# Patient Record
Sex: FEMALE
[De-identification: names, ages, dates, MRNs, and addresses within clinical notes are randomized per-mention and may not be internally consistent; named-entity substitution may affect disease eponyms.]

---

## 2017-05-10 ENCOUNTER — HOSPITAL ENCOUNTER (OUTPATIENT)
Dept: HOSPITAL 14 - H.OPSURG | Age: 57
Discharge: HOME | End: 2017-05-10
Attending: PODIATRIST
Payer: MEDICARE

## 2017-05-10 VITALS
RESPIRATION RATE: 18 BRPM | TEMPERATURE: 98.4 F | DIASTOLIC BLOOD PRESSURE: 83 MMHG | OXYGEN SATURATION: 95 % | HEART RATE: 88 BPM | SYSTOLIC BLOOD PRESSURE: 164 MMHG

## 2017-05-10 VITALS — BODY MASS INDEX: 35.9 KG/M2

## 2017-05-10 DIAGNOSIS — M79.672: ICD-10-CM

## 2017-05-10 DIAGNOSIS — M79.2: Primary | ICD-10-CM

## 2017-05-10 DIAGNOSIS — R60.0: ICD-10-CM

## 2017-05-10 DIAGNOSIS — I10: ICD-10-CM

## 2017-05-10 DIAGNOSIS — J44.9: ICD-10-CM

## 2017-05-10 DIAGNOSIS — M77.52: ICD-10-CM

## 2017-05-10 PROCEDURE — 82948 REAGENT STRIP/BLOOD GLUCOSE: CPT

## 2017-05-10 PROCEDURE — 64640 INJECTION TREATMENT OF NERVE: CPT

## 2017-05-10 RX ADMIN — BUPIVACAINE HYDROCHLORIDE ONE ML: 5 INJECTION, SOLUTION EPIDURAL; INTRACAUDAL; PERINEURAL at 08:30

## 2017-05-10 RX ADMIN — BUPIVACAINE HYDROCHLORIDE ONE ML: 5 INJECTION, SOLUTION EPIDURAL; INTRACAUDAL; PERINEURAL at 08:32

## 2017-05-10 RX ADMIN — LIDOCAINE HYDROCHLORIDE ONE ML: 10 INJECTION, SOLUTION EPIDURAL; INFILTRATION; INTRACAUDAL; PERINEURAL at 08:30

## 2017-05-10 RX ADMIN — LIDOCAINE HYDROCHLORIDE ONE ML: 10 INJECTION, SOLUTION EPIDURAL; INFILTRATION; INTRACAUDAL; PERINEURAL at 08:32

## 2017-05-10 NOTE — CP.SDSHP
Same Day Surgery [facility]





- History


Proposed Procedure: Left foot- platlet rich plasma and amniotic stm cell 

injection


Pre-Op Diagnosis: Left foot metatarsalgia and lateral ankle ligamentous low 

grade injury





- Previous Medical/Surgical History


Cardiac: Hypertension


Pulmonary: Emphysema/COPD


Pain: 5.


Previous Surgical History: Left peroneal primary repair Right buninectomy, 

Laparoscopic procedure of abdomen, Tubual Ligation





- Allergies


Allergies: 


Allergies





No Known Allergies Allergy (Verified 06/27/15 16:59)


 











- Physical Exam


Vital Signs: 


 Vital Signs











  05/10/17 05/10/17





  06:31 06:36


 


Temperature 97.9 F 


 


Pulse Rate 70 70


 


Respiratory 20 





Rate  


 


Blood Pressure 142/82 


 


O2 Sat by Pulse 97 





Oximetry  











Mental Status: Alert & Oriented x3


Neuro: WNL


Heart: WNL


Lungs: Other (Decreased breath sounds, no wheezing, or ronchi)





- {Optional Preform as Required}


Integument: WNL


Ortho: Other (Left foot tenderness of 3rd digit to palpation and ROM. Laxity 

nof 3rd digit noted at level of MTPJ. Lateral ankle pain in inversion and 

stress end point dorsiflexion and plantarflexion)





- Impression


Impression: Pt was seen and examined in SDS.  Pt NPO status was confirmed.  All 

Pre-op testing and clearance was in the chart.  Pt has exhausted all 

conservative treatment at this time and is opting for surgical intervention.  

Pt was explained procedure and post-operative course.  All pt's questions were 

answered to satisfaction.  No guarantees were made.  Pt understands all risks, 

benefits and complications of procedure.  Pt will follow-up with Dr. Jon.


Pt. Evaluated Today:Candidate for Anesthesia & Procedure: Yes





- Date & Time


Date: 05/10/17


Time: 07:00





Short Stay Discharge





- Short Stay Discharge


Admitting Diagnosis/Reason for Visit: M79.2 R60.0 M77.52 M79.672


Disposition: HOME/ ROUTINE


Referrals: 


Tian Simmons MD [Primary Care Provider] - 


Silver Jon DPM [Staff Provider] - 


Follow-up: 





Within 1 week. 


Additional Instructions (Diet, Activity): 


Patient in good/stable condition for discharge home. Pt to resume medications 

per medical reconciliation. Resume regular diet. 


Please keep dressing clean, dry, & intact to surgical site, use plastic bag 

over bandage for


showering, wear post op shoe at all times when ambulating, call clinic if you 

see


signs of infection (redness, swelling, malodor), please make an


appointment to see Dr. Jon in office within 1 week for post-op check.


Progress Note/Discharge Note with Instructions: 





- Patient evaluated bedside in recovery s/p surgical procedure.


- After surgical procedure patient in NAD


- (+) Void, (+) Appetite


- Capillary refill time <3s and NVSI intact.


- Patient denies complaints at this time


- Post operative instructions and plan of care explained to patient at length.


- Pt. acknowledges understanding.


- Patient stable for DC per podiatric surgery

## 2017-05-10 NOTE — PCM.SURG1
Surgeon's Initial Post Op Note





- Surgeon's Notes


Surgeon: Dr. Silver Jon DPM


Assistant: Dr. Terry Moss, PGY1


Type of Anesthesia: Local


Anesthesia Administered By: Dr. Dontrell DPM


Pre-Operative Diagnosis: Left foot lateral ankle pain


Operative Findings: See dictation


Post-Operative Diagnosis: peroneal tendon partial ruptures and adhesions


Operation Performed: Left peroneal radiofrequency ablation with amniotic 

biomaterial injection.


Specimen/Specimens Removed: none


Estimated Blood Loss: EBL {In ML}: 0


Blood Products Given: N/A


Drains Used: No Drains


Post-Op Condition: Good


Date of Surgery/Procedure: 05/10/17


Time of Surgery/Procedure: 09:08

## 2017-05-11 NOTE — OP
PROCEDURE DATE: 05/10/2017



PRIMARY SURGEON:  Dr. Silver Jon.



ASSISTANT:  Terry Moss, PGY-1.



ANESTHESIA:  Local block.



ANESTHESIOLOGIST:  None.



PREOPERATIVE DIAGNOSES:  Left peroneal tendon partial longitudinal ruptures 
with intermediate dorsal cutaneous nerve neuritis.



POSTOPERATIVE DIAGNOSES:  Left peroneal tendon partial longitudinal ruptures 
with intermediate dorsal cutaneous nerve neuritis.



PROCEDURES:

1.  Radiofrequency nerve ablation with thermoneurolysis of left foot.

2.  Amniotic biological graft injection. 



INDICATIONS:  The patient is a 56-year-old female with the above mentioned 
diagnoses.  The patient has exhausted all conservative treatment options and 
now is in need of surgical intervention.  The patient signed a consent after 
careful explanation of risks, benefits, complications and alternatives for the 
post-surgical procedure.  No guarantees were neither given nor implied.



PREPARATION:  The patient was brought to the operating room and placed on the 
operating room table in a supine position.  Attention was then directed to the 
left foot where a total of 6 mL of 0.5% Marcaine plain was injected in a local 
block type fashion to the surgical area.  Adequate anesthesia check was 
performed to confirm that anesthetic had taken effect.  Then the patient's left 
foot was then prepped and draped in the usual sterile manner and the procedure 
began.



PROCEDURE  #1:  Three points were marked along the lateral course of the 
peroneal tendons beginning retro-malleolarly and terminating at the bifurcation 
of the peroneus longus and brevis tendons on the lateral aspect of the foot.  
Three 22-gauge radiofrequency needles were then inserted percutaneously over 
the marked points for the target tissue with impedance ranging from 500-600 
ohms on the machine.  Needles were used to palpate the targeted tissue and was 
slightly pulled back.  At this time, the radiofrequency electrodes were 
inserted in the radiofrequency needle.  Power of the radiofrequency machine was 
then gradually increased for the motor stimulation mode.  The foot was then 
monitored for any signs of fasciculations of the forefoot.  Once we were 
assured that the branch of the medial dorsal cutaneous nerve and lateral 
plantar nerve were identified  radiofrequency unit was set to lesioning mode 
and lesioning of the nerve was performed for a total of 60 seconds at 90 
degrees Celsius.  This procedure was repeated for another 2 points along the 
distal course of the peroneal tendons in the same manner as noted above.



PROCEDURE #2:  At this time, a total of 100 mg of Amniovo Stem Cell parcipitate 
in 3 mL suspension, was injected along the course of the peroneal tendons 
beginning retro-malleolarly and terminating plantar distally, just proximal to 
the fifth metatarsal styloid process.



The surgical site was then cleansed and dressed with 4 x 4 gauze, calf padding 
and Coban.



POSTOPERATIVE CONDITION:  The patient tolerated the anesthesia and the 
procedure well and was transported to the recovery room with vital signs stable 
and neurovascular status intact to the left foot.  The patient will follow up 
with Dr. Jon on an outpatient basis.





__________________________________________

Teryr Moss DPM



__________________________________________

Silver Jon DPM







cc:   



DD: 05/11/2017 18:31:27  [id number]

TT: 05/11/2017 19:39:33

Job # [id number]

rn

MTDD

## 2017-07-29 ENCOUNTER — HOSPITAL ENCOUNTER (EMERGENCY)
Dept: HOSPITAL 14 - H.ER | Age: 57
Discharge: HOME | End: 2017-07-29
Payer: MEDICARE

## 2017-07-29 VITALS — HEART RATE: 69 BPM | DIASTOLIC BLOOD PRESSURE: 50 MMHG | RESPIRATION RATE: 16 BRPM | SYSTOLIC BLOOD PRESSURE: 102 MMHG

## 2017-07-29 VITALS — BODY MASS INDEX: 35.9 KG/M2

## 2017-07-29 VITALS — OXYGEN SATURATION: 95 %

## 2017-07-29 VITALS — TEMPERATURE: 98.2 F

## 2017-07-29 DIAGNOSIS — Z79.84: ICD-10-CM

## 2017-07-29 DIAGNOSIS — Z86.711: ICD-10-CM

## 2017-07-29 DIAGNOSIS — Z86.718: ICD-10-CM

## 2017-07-29 DIAGNOSIS — I10: ICD-10-CM

## 2017-07-29 DIAGNOSIS — F32.9: ICD-10-CM

## 2017-07-29 DIAGNOSIS — M06.9: ICD-10-CM

## 2017-07-29 DIAGNOSIS — Z79.01: ICD-10-CM

## 2017-07-29 DIAGNOSIS — E11.9: ICD-10-CM

## 2017-07-29 DIAGNOSIS — F41.9: ICD-10-CM

## 2017-07-29 DIAGNOSIS — J44.9: Primary | ICD-10-CM

## 2017-07-29 DIAGNOSIS — E78.00: ICD-10-CM

## 2017-07-29 DIAGNOSIS — Z79.82: ICD-10-CM

## 2017-07-29 DIAGNOSIS — M48.54XA: ICD-10-CM

## 2017-07-29 DIAGNOSIS — Z95.5: ICD-10-CM

## 2017-07-29 LAB
APTT BLD: 39.8 SECONDS (ref 25.6–37.1)
BASOPHILS # BLD AUTO: 0.1 K/UL (ref 0–0.2)
BASOPHILS NFR BLD: 2 % (ref 0–2)
BUN SERPL-MCNC: 11 MG/DL (ref 7–17)
CALCIUM SERPL-MCNC: 9.2 MG/DL (ref 8.4–10.2)
CHLORIDE SERPL-SCNC: 110 MMOL/L (ref 98–107)
CO2 SERPL-SCNC: 21 MMOL/L (ref 22–30)
EOSINOPHIL # BLD AUTO: 0.3 K/UL (ref 0–0.7)
EOSINOPHIL NFR BLD: 4.6 % (ref 0–4)
ERYTHROCYTE [DISTWIDTH] IN BLOOD BY AUTOMATED COUNT: 14.3 % (ref 11.5–14.5)
GLUCOSE SERPL-MCNC: 106 MG/DL (ref 65–105)
HCT VFR BLD CALC: 41.4 % (ref 34–47)
LYMPHOCYTES # BLD AUTO: 2.4 K/UL (ref 1–4.3)
LYMPHOCYTES NFR BLD AUTO: 42.8 % (ref 20–40)
MCH RBC QN AUTO: 33.1 PG (ref 27–31)
MCHC RBC AUTO-ENTMCNC: 33.8 G/DL (ref 33–37)
MCV RBC AUTO: 97.7 FL (ref 81–99)
MONOCYTES # BLD: 0.5 K/UL (ref 0–0.8)
MONOCYTES NFR BLD: 9.5 % (ref 0–10)
NEUTROPHILS # BLD: 2.3 K/UL (ref 1.8–7)
NEUTROPHILS NFR BLD AUTO: 41.1 % (ref 50–75)
NRBC BLD AUTO-RTO: 0.2 % (ref 0–0)
PLATELET # BLD: 153 K/UL (ref 130–400)
PMV BLD AUTO: 9.6 FL (ref 7.2–11.7)
POTASSIUM SERPL-SCNC: 4.1 MMOL/L (ref 3.6–5)
SODIUM SERPL-SCNC: 140 MMOL/L (ref 132–148)
WBC # BLD AUTO: 5.5 K/UL (ref 4.8–10.8)

## 2017-07-29 PROCEDURE — 84484 ASSAY OF TROPONIN QUANT: CPT

## 2017-07-29 PROCEDURE — 85610 PROTHROMBIN TIME: CPT

## 2017-07-29 PROCEDURE — 94640 AIRWAY INHALATION TREATMENT: CPT

## 2017-07-29 PROCEDURE — 85025 COMPLETE CBC W/AUTO DIFF WBC: CPT

## 2017-07-29 PROCEDURE — 85730 THROMBOPLASTIN TIME PARTIAL: CPT

## 2017-07-29 PROCEDURE — 96375 TX/PRO/DX INJ NEW DRUG ADDON: CPT

## 2017-07-29 PROCEDURE — 99285 EMERGENCY DEPT VISIT HI MDM: CPT

## 2017-07-29 PROCEDURE — 71020: CPT

## 2017-07-29 PROCEDURE — 96374 THER/PROPH/DIAG INJ IV PUSH: CPT

## 2017-07-29 PROCEDURE — 80048 BASIC METABOLIC PNL TOTAL CA: CPT

## 2017-07-29 PROCEDURE — 93005 ELECTROCARDIOGRAM TRACING: CPT

## 2017-07-29 PROCEDURE — 83880 ASSAY OF NATRIURETIC PEPTIDE: CPT

## 2017-07-29 PROCEDURE — 71275 CT ANGIOGRAPHY CHEST: CPT

## 2017-07-29 NOTE — CT
EXAM:

  CT Angiography Chest With Intravenous Contrast



CLINICAL HISTORY:

  56 years old, female; Pain; Chest pain; On breathing



TECHNIQUE:

  Axial computed tomographic angiography images of the chest with intravenous 

contrast using pulmonary embolism protocol.  This CT exam was performed using 

one or more of the following dose reduction techniques:  automated exposure 

control, adjustment of the mA and/or kV according to patient size, and/or use 

of iterative reconstruction technique.

  MIP reconstructed images were created and reviewed.

  Coronal and sagittal reformatted images were created and reviewed.



CONTRAST:

  95 mL of visipaque administered intravenously.



EXAM DATE/TIME:

  7/29/2017 5:01 PM



COMPARISON:

  CT - ANGIO CHEST PE PROTOCOL 4/3/2016 1:26:10 PM



FINDINGS:

  Artifacts:  Streak artifact degrades image quality. Motion artifact degrades 

image quality.

  Heart, aorta and Pulmonary arteries:  Heart size is at the upper limits of 

normal. There is no pericardial effusion. There are coronary 

calcifications.There is no aneurysm or dissection.There are vascular 

calcifications. There is perfusion of the arch vessels. There are no pulmonary 

emboli.

  

  Lungs and pleural spaces:  Trachea and main bronchi are patent.There is no 

pneumothorax. There is dependent atelectasis bilaterally greatest in the lower 

lobes. There is a 6.6 mm nodular opacity at the left base. There is more focal 

subsegmental atelectasis at the left base. There are no effusions.

  

  Mediastinum:  The esophagus is unremarkable. There are no pathologically 

enlarged mediastinal nodes. There is right hilar node. Maximal AP dimension is 

approximately 3.4 cm, increased compared to the prior study

 Thyroid:  Thyroid is only partially imaged.

  Bones/joints:  Bony structures are mildly osteopenic. There has been 

development of a compression fracture at T9. There is now a compression 

fracture of T12.There are degenerative changes in the osseus structures.

  Soft tissues:  unremarkable

  

  Upper abdomen:  There are no acute abnormalities in the visualized portion of 

the abdomen.An IVC filter is in place.



IMPRESSION: No aneurysm, dissection or pulmonary embolus; atelectasis greatest 

at the left base; interval development of compression fractures T9 and T12; 

right hilar rafaela mass with possible increased size, infectious/inflammatory 

versus neoplastic



CT/PET may be helpful for further evaluation

## 2017-07-29 NOTE — ED PDOC
HPI: SOB/CHF/COPD


Time Seen by Provider: 17 15:27


Chief Complaint (Nursing): Shortness Of Breath


Chief Complaint (Provider): Back Pain and Shortness of Breath


History Per: Patient


History/Exam Limitations: no limitations


Additional Complaint(s): 


Michelle Weaver, a 56 year old female, who has a past medical history of COPD, 

chronic back pain, arthritis and pulmonary embolism presents to the ED 

complaining of back pain and shortness of breath which she has been 

experiencing for the past couple weeks. The patient states that the pain is in 

both her shoulders and radiates down her back on both sides. She states that 

she took oxycotin for the pain with no relief.  The patient states that she has 

chronic back pain for which she takes oxycotin. Additionally, the patient 

states she also has chronic shortness of breath due to COPD. She states that 

her shortness of breath has gotten worse over the past week. Patient states she 

also has a cough but that is chronic. Denies chest pain, leg swelling and fever.





PMD: Dr. Gutierrez








Past Medical History


Reviewed: Historical Data, Nursing Documentation, Vital Signs


Vital Signs: 


 Last Vital Signs











Temp  98.2 F   17 15:11


 


Pulse  69   17 19:38


 


Resp  16   17 19:38


 


BP  102/50 L  17 19:38


 


Pulse Ox  95   17 20:33














- Medical History


PMH: Anxiety, Arthritis, Asthma, Back Problems (Chronic back pain), Bronchitis, 

CAD, COPD (currently takin xarelto), Depression, Diabetes, Deep Vein Thrombosis

, HTN, Hypercholesterolemia, Pneumonia, Pulmonary Embolism, Rheumatoid Arthritis


   Denies: HIV, Chronic Kidney Disease





- Surgical History


Surgical History: Coronary Stent (10/2013), Endoscopy, Tonsillectomy





- Family History


Family History: States: Stroke





- Immunization History


Hx Tetanus Toxoid Vaccination: No


Hx Influenza Vaccination: Yes


Hx Pneumococcal Vaccination: No





- Home Medications


Home Medications: 


 Ambulatory Orders











 Medication  Instructions  Recorded


 


Omeprazole 40 mg PO DAILY 04/16/15


 


ALPRAZolam [Xanax] 1 mg PO QID #0 tab 05/25/15


 


Aripiprazole [Abilify] 10 mg PO DAILY #0 tab 05/25/15


 


Ergocalciferol [Vitamin D] 50,000 unit PO QWK #0 sgl 05/25/15


 


Escitalopram [Lexapro] 20 mg PO DAILY #0 tab 05/25/15


 


Folic Acid 1 mg PO DAILY #0 tab 05/25/15


 


Furosemide [Lasix] 40 mg PO DAILY #0 tab 05/25/15


 


Lovastatin 10 mg PO DAILY #0 tab 05/25/15


 


Aspirin [Aspirin EC] 81 mg PO DAILY 06/27/15


 


Albuterol Sulfate [Proair Hfa] 2 puff IH Q4H PRN 10/12/15


 


Albuterol/Ipratropium [Duoneb 3 3 ml IH DAILY 10/12/15





mg/0.5 mg (3 ml) UD]  


 


Mometasone/Formoterol [Dulera 200 2 puff INH DAILY 10/12/15





Mcg/5 Mcg Inhaler]  


 


Montelukast [Singulair] 10 mg PO HS 10/12/15


 


Metformin Hydrochloride [Metformin] 1,000 mg PO DAILY 16


 


Topiramate [Topamax] 25 mg PO DAILY 16


 


Potassium Chloride [K-Dur 20 mEq 20 meq PO DAILY 16





ER Tab]  


 


traZODone [Desyrel] 200 mg PO HS 16


 


Topiramate [Topamax] 50 mg PO HS 16


 


Zolpidem Tartrate [Ambien] 10 mg PO HS 16


 


oxyCODONE [oxyCODONE Immediate 15 mg PO QID PRN 16





Release Tab]  


 


Morphine [Morphine Extended 30 mg PO BID #14 tabsr 16





Release Tab]  


 


Nystatin [Nystatin Oral Susp] 5 ml PO QID #480 udc 16


 


Rivaroxaban [Xarelto] 20 mg PO DAILY  tab 16


 


predniSONE [predniSONE Tab] 5 mg PO DAILY #63 tab 16


 


Gabapentin [Neurontin] 400 mg PO TID 05/10/17


 


traMADol [Ultram] 50 mg PO TID PRN #12 tab 17














- Allergies


Allergies/Adverse Reactions: 


 Allergies











Allergy/AdvReac Type Severity Reaction Status Date / Time


 


No Known Allergies Allergy   Verified 17 15:08














Review of Systems


ROS Statement: Except As Marked, All Systems Reviewed And Found Negative


Constitutional: Negative for: Fever


Cardiovascular: Negative for: Chest Pain


Respiratory: Positive for: Cough, Shortness of Breath


Musculoskeletal: Positive for: Back Pain





Physical Exam





- Reviewed


Nursing Documentation Reviewed: Yes


Vital Signs Reviewed: Yes





- Physical Exam


Appears: Positive for: Non-toxic, No Acute Distress


Head Exam: Positive for: ATRAUMATIC, NORMAL INSPECTION, NORMOCEPHALIC


Skin: Positive for: Normal Color, Warm, Dry


Eye Exam: Positive for: Normal appearance, EOMI, PERRL


ENT: Positive for: Normal ENT Inspection


Neck: Positive for: Normal, Painless ROM, Supple


Cardiovascular/Chest: Positive for: Regular Rate, Rhythm, Chest Non Tender.  

Negative for: Tachycardia


Respiratory: Positive for: Normal Breath Sounds.  Negative for: Wheezing, 

Respiratory Distress


Gastrointestinal/Abdominal: Positive for: Normal Exam, Soft.  Negative for: 

Tenderness


Back: Positive for: Other (Bilateral paraspinal tenderness; pain)


Extremity: Positive for: Normal ROM (Pain with ROM in left side.).  Negative for

: Deformity, Swelling


Neurologic/Psych: Positive for: Alert, Oriented, Gait





- Laboratory Results


Result Diagrams: 


 17 15:56





 17 15:56





- ECG


ECG: Positive for: Interpreted By Me, Viewed By Me


ECG Rhythm: Positive for: Normal QRS, Normal ST Segment, Sinus Rhythm


O2 Sat by Pulse Oximetry: 95 (RA)


Pulse Ox Interpretation: Normal





- Radiology


X-Ray: Interpreted by Me, Viewed By Me


X-Ray Interpretation: No Acute Disease





- Progress


Re-evaluation Time: 20:00


Condition: Re-examined, Improved





Medical Decision Making


Medical Decision Makin Initial Impression: 56 year old female presenting with dysnea and  back 

pain





Differentials: COPD, Chronic Back Pain, Arthritis, Pulmonary Embolism





Initial Plan:


* CT Angio Chest PE Protocol


* EKG


* B-type natriuretic


* Basic Metabolic Panel


* Troponin


* CBC


* PTT


* Prothrombim Time


* Chest X-ray


* Duoneb 3mg/0.5 mg (3ml) UD 3ml INH


* Solu-Medrol 125mg IVP


* Toradol 30mg IVP


* Peak Flow pre/Post TX .Pre/post Treatment


* Reevaluation








CT Angiography Chest With Intravenous Contrast


EXAM DATE/TIME:


2017 5:01 PM


COMPARISON:


CT - ANGIO CHEST PE PROTOCOL 4/3/2016 1:26:10 PM


FINDINGS:


Artifacts: Streak artifact degrades image quality. Motion artifact degrades 

image quality.


Heart, aorta and Pulmonary arteries: Heart size is at the upper limits of 

normal. There is no


pericardial effusion. There are coronary calcifications.There is no aneurysm or 

dissection.There are


vascular calcifications. There is perfusion of the arch vessels. There are no 

pulmonary emboli.


Lungs and pleural spaces: Trachea and main bronchi are patent.There is no 

pneumothorax. There


is dependent atelectasis bilaterally greatest in the lower lobes. There is a 

6.6 mm nodular opacity at


the left base. There is more focal subsegmental atelectasis at the left base. 

There are no effusions.


Mediastinum: The esophagus is unremarkable. There are no pathologically 

enlarged mediastinal


nodes. There is right hilar node. Maximal AP dimension is approximately 3.4 cm, 

increased compared


to the prior study


Thyroid: Thyroid is only partially imaged.


Bones/joints: Bony structures are mildly osteopenic. There has been development 

of a compression


fracture at T9. There is now a compression fracture of T12.There are 

degenerative changes in the


osseus structures.


Soft tissues: unremarkable


Upper abdomen: There are no acute abnormalities in the visualized portion of 

the abdomen.An IVC


filter is in place.


IMPRESSION:


 No aneurysm, dissection or pulmonary embolus; atelectasis greatest at the left 

base;


interval development of compression fractures T9 and T12; right hilar rafaela 

mass with possible


increased size, infectious/inflammatory versus neoplastic





________________________________________________________________________________


Scribe Attestation


Documented by Fide Enriquez acting as a scribe for Cadence Shelby MD.





Provider Attestation


All medical record entries made by the Scribe were at my direction and 

personally dictated by me. I have reviewed the chart and agree that the record 

accurately reflects my personal performance of the history, physical exam, 

medical decision making, and the department course for this patient. I have 

also personally directed, reviewed, and agree with the discharge instructions 

and disposition.








Disposition





- Clinical Impression


Clinical Impression: 


 COPD (chronic obstructive pulmonary disease), Back pain, Thoracic compression 

fracture








- Patient ED Disposition


Is Patient to be Admitted: No


Discussed With : Mina Escobar


Doctor Will See Patient In The: Office


Counseled Patient/Family Regarding: Studies Performed, Diagnosis, Need For 

Followup





- Disposition


Referrals: 


Mina Escobar MD [Staff Provider] - 


Disposition: Routine/Home


Disposition Time: 20:27


Condition: GOOD


Additional Instructions: 


Take your medications as instructed. Follow up with your PCP in 2-3 days. 


Prescriptions: 


traMADol [Ultram] 50 mg PO TID PRN #12 tab


 PRN Reason: Pain, Severe (8-10)


Instructions:  Vertebral Compression Fracture (ED), COPD (Chronic Obstructive 

Pulmonary Disease) (ED)

## 2017-07-30 NOTE — CARD
--------------- APPROVED REPORT --------------





EKG Measurement

Heart [id number]

TN 124P2

[id number]

[id number]

[id number]



<Conclusion>

Normal sinus rhythm with sinus arrhythmia

Normal ECG

## 2017-07-30 NOTE — RAD
HISTORY:

 dyspnea 



COMPARISON:

No prior.



TECHNIQUE:

Chest PA and lateral



FINDINGS:



LUNGS:

No active pulmonary disease.



PLEURA:

No significant pleural effusion identified. No pneumothorax apparent.



CARDIOVASCULAR:

Normal.



OSSEOUS STRUCTURES:

No significant abnormalities.



VISUALIZED UPPER ABDOMEN:

Normal.



OTHER FINDINGS:

None.



IMPRESSION:

No active disease.

## 2017-12-26 ENCOUNTER — HOSPITAL ENCOUNTER (EMERGENCY)
Dept: HOSPITAL 14 - H.ER | Age: 57
Discharge: HOME | End: 2017-12-26
Payer: MEDICARE

## 2017-12-26 VITALS
TEMPERATURE: 98.2 F | OXYGEN SATURATION: 98 % | HEART RATE: 65 BPM | DIASTOLIC BLOOD PRESSURE: 67 MMHG | SYSTOLIC BLOOD PRESSURE: 120 MMHG | RESPIRATION RATE: 20 BRPM

## 2017-12-26 VITALS — BODY MASS INDEX: 35.9 KG/M2

## 2017-12-26 DIAGNOSIS — Z95.5: ICD-10-CM

## 2017-12-26 DIAGNOSIS — F17.210: ICD-10-CM

## 2017-12-26 DIAGNOSIS — I25.10: ICD-10-CM

## 2017-12-26 DIAGNOSIS — E78.00: ICD-10-CM

## 2017-12-26 DIAGNOSIS — Z79.84: ICD-10-CM

## 2017-12-26 DIAGNOSIS — Z79.82: ICD-10-CM

## 2017-12-26 DIAGNOSIS — M54.9: Primary | ICD-10-CM

## 2017-12-26 DIAGNOSIS — F32.9: ICD-10-CM

## 2017-12-26 DIAGNOSIS — Z86.718: ICD-10-CM

## 2017-12-26 DIAGNOSIS — G89.29: ICD-10-CM

## 2017-12-26 DIAGNOSIS — I10: ICD-10-CM

## 2017-12-26 DIAGNOSIS — E11.9: ICD-10-CM

## 2017-12-26 DIAGNOSIS — Z86.711: ICD-10-CM

## 2017-12-26 PROCEDURE — 72128 CT CHEST SPINE W/O DYE: CPT

## 2017-12-26 PROCEDURE — 96375 TX/PRO/DX INJ NEW DRUG ADDON: CPT

## 2017-12-26 PROCEDURE — 96374 THER/PROPH/DIAG INJ IV PUSH: CPT

## 2017-12-26 PROCEDURE — 99283 EMERGENCY DEPT VISIT LOW MDM: CPT

## 2017-12-26 PROCEDURE — 72131 CT LUMBAR SPINE W/O DYE: CPT

## 2017-12-26 NOTE — CT
PROCEDURE:  CT Thoracic Spine without contrast



HISTORY:

severe pain







COMPARISON:

4/11/2017



TECHNIQUE:

Axial computed tomography images were obtained of the thoracic spine 

without intravenous contrast. Coronal and sagittal reformatted images 

were created and reviewed.



Radiation dose:



Total exam DLP = 1222.77 mGy-cm.



This CT exam was performed using one or more of the following dose 

reduction techniques: Automated exposure control, adjustment of the 

mA and/or kV according to patient size, and/or use of iterative 

reconstruction technique.



FINDINGS:



VERTEBRAE:

There is mild anterior wedge compression deformity of the T9 

vertebral body.  There is wedge compression deformity of the T12 

vertebral body with mild bony retropulsion.  These are both unchanged 

in appearance when compared to the prior examination of 4/11/2017.. 

There is no other fracture identified. There is no lytic or blastic 

osseous lesion. 



DISCS/SPINAL CANAL/NEURAL FORAMINA:

There is no significant central canal stenosis. There is flattening 

of the ventral aspect of the thecal sac at the T12-L1 level..



PARASPINAL SOFT TISSUES:

Unremarkable.



OTHER FINDINGS:

Unremarkable.



IMPRESSION:

Old compression deformities of the T9 and T12 vertebrae with mild 

retropulsion of the inferior aspect of the T12 vertebra.  No change 

from 4/11/2017.

## 2017-12-26 NOTE — ED PDOC
HPI: Back


Time Seen by Provider: 12/26/17 16:44


Chief Complaint (Nursing): Back Pain


Chief Complaint (Provider): Back Pain


History Per: Patient


History/Exam Limitations: no limitations


Onset/Duration Of Symptoms: Days (x3)


Current Symptoms Are (Timing): Still Present


Additional Complaint(s): 





56 y/o female with a past medical history of hypertension, hypercholesterolemia

, chronic obstructive pulomnary disorder (COPD), and chronic back pain who 

presents to the emergency department accompanied by nephew with a complaint of 

a lower back pain x2 days. Patient is unable to lay on back, or get up to use 

the bathroom and says pain worsens with movement. As per history from nephew, 

patient's back pain worsened after being in the rain outside and doing extra 

work on 12/23/2017. Patient states she has been using Lidocaine Cream and 

Biofreeze for temporary relief of symptoms. Denies any fall or heavy lifting, 

chest pain, shortness of breath, or incontinence. 





Of note, patient sees pain management doctor for chronic back pain and takes 10/

325 mg and 30 mg of percocet. Last dose was around 5 am today, 12/26/2017. 








PMD: Dr. Kingston Green MD





Past Medical History


Reviewed: Historical Data, Nursing Documentation, Vital Signs


Vital Signs: 


 Last Vital Signs











Temp  98.2 F   12/26/17 16:43


 


Pulse  65   12/26/17 16:43


 


Resp  20   12/26/17 16:43


 


BP  120/67   12/26/17 16:43


 


Pulse Ox  98   12/26/17 16:43














- Medical History


PMH: Anxiety, Arthritis, Asthma, Back Problems (Chronic back pain), Bronchitis, 

CAD, COPD (currently takin xarelto), Depression, Diabetes, Deep Vein Thrombosis

, HTN, Hypercholesterolemia, Pneumonia, Pulmonary Embolism, Rheumatoid Arthritis


   Denies: HIV, Chronic Kidney Disease





- Surgical History


Surgical History: Coronary Stent (10/2013), Endoscopy, Tonsillectomy





- Family History


Family History: States: Stroke





- Social History


Current smoker - smoking cessation education provided: Yes (Heavy Smoker > 10 

Cigarettes Daily)


Ex-Smoker (has not smoked in the last 12 months): No


Alcohol: None





- Immunization History


Hx Tetanus Toxoid Vaccination: No


Hx Influenza Vaccination: Yes


Hx Pneumococcal Vaccination: No





- Home Medications


Home Medications: 


 Ambulatory Orders











 Medication  Instructions  Recorded


 


Omeprazole 40 mg PO DAILY 04/16/15


 


ALPRAZolam [Xanax] 1 mg PO QID #0 tab 05/25/15


 


Aripiprazole [Abilify] 10 mg PO DAILY #0 tab 05/25/15


 


Ergocalciferol [Vitamin D] 50,000 unit PO QWK #0 sgl 05/25/15


 


Escitalopram [Lexapro] 20 mg PO DAILY #0 tab 05/25/15


 


Folic Acid 1 mg PO DAILY #0 tab 05/25/15


 


Furosemide [Lasix] 40 mg PO DAILY #0 tab 05/25/15


 


Lovastatin 10 mg PO DAILY #0 tab 05/25/15


 


Aspirin [Aspirin EC] 81 mg PO DAILY 06/27/15


 


Albuterol Sulfate [Proair Hfa] 2 puff IH Q4H PRN 10/12/15


 


Albuterol/Ipratropium [Duoneb 3 3 ml IH DAILY 10/12/15





mg/0.5 mg (3 ml) UD]  


 


Mometasone/Formoterol [Dulera 200 2 puff INH DAILY 10/12/15





Mcg/5 Mcg Inhaler]  


 


Montelukast [Singulair] 10 mg PO HS 10/12/15


 


Metformin Hydrochloride [Metformin] 1,000 mg PO DAILY 03/02/16


 


Topiramate [Topamax] 25 mg PO DAILY 03/02/16


 


Potassium Chloride [K-Dur 20 mEq 20 meq PO DAILY 03/30/16





ER Tab]  


 


traZODone [Desyrel] 200 mg PO HS 03/30/16


 


Topiramate [Topamax] 50 mg PO HS 12/18/16


 


Zolpidem Tartrate [Ambien] 10 mg PO HS 12/18/16


 


oxyCODONE [oxyCODONE Immediate 15 mg PO QID PRN 12/18/16





Release Tab]  


 


Morphine [Morphine Extended 30 mg PO BID #14 tabsr 12/20/16





Release Tab]  


 


Nystatin [Nystatin Oral Susp] 5 ml PO QID #480 udc 12/20/16


 


Rivaroxaban [Xarelto] 20 mg PO DAILY  tab 12/20/16


 


predniSONE [predniSONE Tab] 5 mg PO DAILY #63 tab 12/20/16


 


Gabapentin [Neurontin] 400 mg PO TID 05/10/17


 


traMADol [Ultram] 50 mg PO TID PRN #12 tab 07/29/17


 


Ibuprofen [Motrin] 600 mg PO Q6 #20 tab 12/26/17


 


Oxycodone HCl/Acetaminophen 1 each PO Q4 #10 tablet 12/26/17





[Percocet  mg Tablet]  














- Allergies


Allergies/Adverse Reactions: 


 Allergies











Allergy/AdvReac Type Severity Reaction Status Date / Time


 


No Known Allergies Allergy   Verified 07/29/17 15:08














Review of Systems


ROS Statement: Except As Marked, All Systems Reviewed And Found Negative (As 

per HPI, otherwise negative)


Constitutional: Negative for: Other (fall or heavy lifting)


Cardiovascular: Negative for: Chest Pain


Respiratory: Negative for: Shortness of Breath


Genitourinary Female: Negative for: Incontinence


Musculoskeletal: Positive for: Back Pain





Physical Exam





- Reviewed


Nursing Documentation Reviewed: Yes


Vital Signs Reviewed: Yes





- Physical Exam


Appears: Positive for: Non-toxic, No Acute Distress


Head Exam: Positive for: ATRAUMATIC, NORMAL INSPECTION, NORMOCEPHALIC


Skin: Positive for: Normal Color, Warm, Dry


Back: Positive for: Other (Patient is in prone position and unable to move due 

to pain. Thoracic and lumbar midline and paraspinal tenderness noted. ).  

Negative for: Normal Inspection


Neurologic/Psych: Positive for: Alert, Oriented (x3)





- ECG


O2 Sat by Pulse Oximetry: 98 (RA)


Pulse Ox Interpretation: Normal





Medical Decision Making


Medical Decision Making: 





Time: 1727


Initial impression: Acute Chronic Back Pain


Initial plan:


--Toradol 30 mg IVP


--Morphine 4 mg IV 


--Lumbar spine CT 


--Thoracic Spine CT


--Reevaluation 





Time: 1837


--Throacic Spine CT


FINDINGS:





VERTEBRAE:


There is mild anterior wedge compression deformity of the T9 vertebral body.  

There is wedge compression deformity of the T12 vertebral body with mild bony 

retropulsion.  These are both unchanged in appearance when compared to the 

prior examination of 4/11/2017.. There is no other fracture identified. There 

is no lytic or blastic osseous lesion. 





DISCS/SPINAL CANAL/NEURAL FORAMINA:


There is no significant central canal stenosis. There is flattening of the 

ventral aspect of the thecal sac at the T12-L1 level..





PARASPINAL SOFT TISSUES:


Unremarkable.





OTHER FINDINGS:


Unremarkable.





IMPRESSION:


Old compression deformities of the T9 and T12 vertebrae with mild retropulsion 

of the inferior aspect of the T12 vertebra.  No change from 4/11/2017.





Time: 1941


--Lumbar Spine CT


FINDINGS:


Vertebrae: There is old compression deformity T12. There is partial destruction 

of the inferior


endplate of T12. There is distortion of the T12-L1 disc space. There is minimal 

retrolisthesis of T12 on


L1.


L1, L2 and L3 are normal in height. L5 is normal in height.


There is an old compression fracture L4. There is deformity of the L4 superior 

endplate. There is


deformity of the L3-L4 disc with posterior and lateral disc bulging.


There is mild posterior disc space narrowing L1-L2.


Posterior elements are intact at all levels. Facet joints align anatomically. 

There degenerative facet


disease greatest at L5/S1. There is calcification in the L5-S1 disc. Spinous 

processes align in the


expected fashion.


Discs/spinal canal/neural foramina: See above.


Soft tissues: Psoas and paraspinous muscles are symmetric.


Vasculature: There are vascular calcifications. An IVC filter is in place.


IMPRESSION: Old compression fractures at T12 and L4; multilevel degenerative 

change


Additional findings as described above.





Time: 1945


--Morphine 2 mg IV 








Time: 2025





Upon provider reevaluation patient is feeling better, is medically stable, and 

requires no further treatment in the ED at this time. Patient will be 

discharged home with Rx for Motrin 600 mg and Percocet  mg. Counseling 

was provided and all questions were answered regarding diagnosis and need for 

follow up with primary care doctor. There is agreement to discharge plan. 

Return if symptoms persist or worsen.





Clinical Impression: Chronic back pain 








Scribe Attestation:


Documented by Ira Mcmahan, acting as a scribe for Brooklyn Reyes PA-C





Provider Scribe Attestation:


All medical record entries made by the Scribe were at my direction and 

personally dictated by me. I have reviewed the chart and agree that the record 

accurately reflects my personal performance of the history, physical exam, 

medical decision making, and the department course for this patient. I have 

also personally directed, reviewed, and agree with the discharge instructions 

and disposition.





Disposition





- Clinical Impression


Clinical Impression: 


 Chronic back pain





Counseled Patient/Family Regarding: Studies Performed, Diagnosis, Need For 

Followup, Rx Given





- Disposition


Disposition: Routine/Home


Disposition Time: 20:25


Condition: STABLE


Prescriptions: 


Ibuprofen [Motrin] 600 mg PO Q6 #20 tab


Oxycodone HCl/Acetaminophen [Percocet  mg Tablet] 1 each PO Q4 #10 tablet


Instructions:  Chronic Back Pain (ED)


Forms:  [facility] (English)

## 2017-12-26 NOTE — CT
EXAM:

  CT Lumbar Spine Without Intravenous Contrast



EXAM DATE/TIME:

  12/26/2017 5:27 PM



CLINICAL HISTORY:

  57 years old, female; Pain; Low back pain; Patient HX: Lower back pain x 2 

days, pat unable to lay on her back, denies trauma



TECHNIQUE:

  Axial computed tomography images of the lumbar spine without intravenous 

contrast.  All CT scans at this facility use one or more dose reduction 

techniques, viz.: automated exposure control; ma/kV adjustment per patient size 

(including targeted exams where dose is matched to indication; i.e. head); or 

iterative reconstruction technique.

  Coronal and sagittal reformatted images were created and reviewed.



COMPARISON:

  MR - SPINAL CANAL LUMBAR W/O CONT 2017-02-17 11:37



FINDINGS:

  Vertebrae: There is old compression deformity T12. There is partial 

destruction of the inferior endplate of T12. There is distortion of the T12-L1 

disc space. There is minimal retrolisthesis of T12 on L1.  

L1, L2 and L3 are normal in height. L5 is normal in height. 

There is an old compression fracture L4. There is deformity of the L4 superior 

endplate. There is deformity of the L3-L4 disc with posterior and lateral disc 

bulging.

There is mild posterior disc space narrowing L1-L2. 

Posterior elements are intact at all levels. Facet joints align anatomically. 

There degenerative facet disease greatest at L5/S1. There is calcification in 

the L5-S1 disc. Spinous processes align in the expected fashion.

  Discs/spinal canal/neural foramina:  See above.

  Soft tissues:  Psoas and paraspinous muscles are symmetric.

  Vasculature:  There are vascular calcifications. An IVC filter is in place.



IMPRESSION:  Old compression fractures at T12 and L4; multilevel degenerative 

change



Additional findings as described above.

## 2018-02-11 ENCOUNTER — HOSPITAL ENCOUNTER (INPATIENT)
Dept: HOSPITAL 14 - H.ER | Age: 58
LOS: 2 days | Discharge: HOME | DRG: 544 | End: 2018-02-13
Attending: FAMILY MEDICINE | Admitting: FAMILY MEDICINE
Payer: MEDICARE

## 2018-02-11 VITALS — BODY MASS INDEX: 35.9 KG/M2

## 2018-02-11 DIAGNOSIS — J45.909: ICD-10-CM

## 2018-02-11 DIAGNOSIS — Z86.718: ICD-10-CM

## 2018-02-11 DIAGNOSIS — Z79.01: ICD-10-CM

## 2018-02-11 DIAGNOSIS — J44.9: ICD-10-CM

## 2018-02-11 DIAGNOSIS — G89.29: ICD-10-CM

## 2018-02-11 DIAGNOSIS — E78.5: ICD-10-CM

## 2018-02-11 DIAGNOSIS — F41.9: ICD-10-CM

## 2018-02-11 DIAGNOSIS — Z86.711: ICD-10-CM

## 2018-02-11 DIAGNOSIS — Z72.0: ICD-10-CM

## 2018-02-11 DIAGNOSIS — M06.9: ICD-10-CM

## 2018-02-11 DIAGNOSIS — E78.00: ICD-10-CM

## 2018-02-11 DIAGNOSIS — I10: ICD-10-CM

## 2018-02-11 DIAGNOSIS — M48.54XD: ICD-10-CM

## 2018-02-11 DIAGNOSIS — Z95.5: ICD-10-CM

## 2018-02-11 DIAGNOSIS — M48.54XA: Primary | ICD-10-CM

## 2018-02-11 DIAGNOSIS — E66.9: ICD-10-CM

## 2018-02-11 DIAGNOSIS — I25.10: ICD-10-CM

## 2018-02-11 DIAGNOSIS — E11.51: ICD-10-CM

## 2018-02-11 DIAGNOSIS — E87.5: ICD-10-CM

## 2018-02-11 DIAGNOSIS — F32.9: ICD-10-CM

## 2018-02-11 LAB
ALBUMIN SERPL-MCNC: 4.3 G/DL (ref 3.5–5)
ALBUMIN/GLOB SERPL: 1.1 {RATIO} (ref 1–2.1)
ALT SERPL-CCNC: 24 U/L (ref 9–52)
AST SERPL-CCNC: 44 U/L (ref 14–36)
BACTERIA #/AREA URNS HPF: (no result) /[HPF]
BASOPHILS # BLD AUTO: 0.1 K/UL (ref 0–0.2)
BASOPHILS NFR BLD: 1.3 % (ref 0–2)
BILIRUB UR-MCNC: NEGATIVE MG/DL
BUN SERPL-MCNC: 9 MG/DL (ref 7–17)
CALCIUM SERPL-MCNC: 9.6 MG/DL (ref 8.4–10.2)
COLOR UR: YELLOW
EOSINOPHIL # BLD AUTO: 0.2 K/UL (ref 0–0.7)
EOSINOPHIL NFR BLD: 2.4 % (ref 0–4)
ERYTHROCYTE [DISTWIDTH] IN BLOOD BY AUTOMATED COUNT: 14.6 % (ref 11.5–14.5)
GFR NON-AFRICAN AMERICAN: > 60
GLUCOSE UR STRIP-MCNC: (no result) MG/DL
HGB BLD-MCNC: 15.1 G/DL (ref 12–16)
LEUKOCYTE ESTERASE UR-ACNC: (no result) LEU/UL
LYMPHOCYTES # BLD AUTO: 2.4 K/UL (ref 1–4.3)
LYMPHOCYTES NFR BLD AUTO: 30.8 % (ref 20–40)
MCH RBC QN AUTO: 30.6 PG (ref 27–31)
MCHC RBC AUTO-ENTMCNC: 33.2 G/DL (ref 33–37)
MCV RBC AUTO: 92.4 FL (ref 81–99)
MONOCYTES # BLD: 0.6 K/UL (ref 0–0.8)
MONOCYTES NFR BLD: 7.9 % (ref 0–10)
NEUTROPHILS # BLD: 4.5 K/UL (ref 1.8–7)
NEUTROPHILS NFR BLD AUTO: 57.6 % (ref 50–75)
NRBC BLD AUTO-RTO: 0.2 % (ref 0–0)
PH UR STRIP: 7 [PH] (ref 5–8)
PLATELET # BLD: 183 K/UL (ref 130–400)
PMV BLD AUTO: 9.7 FL (ref 7.2–11.7)
PROT UR STRIP-MCNC: NEGATIVE MG/DL
RBC # BLD AUTO: 4.93 MIL/UL (ref 3.8–5.2)
RBC # UR STRIP: NEGATIVE /UL
SP GR UR STRIP: 1.01 (ref 1–1.03)
SQUAMOUS EPITHIAL: 2 /HPF (ref 0–5)
URINE CLARITY: CLEAR
URINE NITRATE: NEGATIVE
UROBILINOGEN UR-MCNC: (no result) MG/DL (ref 0.2–1)
WBC # BLD AUTO: 7.8 K/UL (ref 4.8–10.8)

## 2018-02-11 NOTE — CT
EXAM:

  CT Abdomen and Pelvis Without Intravenous Contrast



CLINICAL HISTORY:

  57 years old, female; Pain; Abdominal pain; Flank; Right; Additional info: 

Renal colic on right



TECHNIQUE:

  Axial computed tomography images of the abdomen and pelvis without 

intravenous contrast.  All CT scans at this facility use one or more dose 

reduction techniques, viz.: automated exposure control; ma/kV adjustment per 

patient size (including targeted exams where dose is matched to indication; 

i.e. head); or iterative reconstruction technique.

  Coronal and sagittal reformatted images were created and reviewed.



COMPARISON:

  CT - ABD   PELVIS PO CONTRAST ONLY 2016-06-08 12:51



FINDINGS:

  Lower thorax:  Minimal atelectasis/scarring.



 ABDOMEN:

  Liver:  Unremarkable.

  Gallbladder and bile ducts:  No calcified stones.  No ductal dilation.

  Pancreas:  Unremarkable.  No ductal dilation.

  Spleen:  No splenomegaly.

  Adrenals:  No mass.

  Kidneys and ureters:  Scarring of left kidney. No renal calculi.  No 

hydronephrosis.

  Stomach and bowel:  Few scattered diverticula within colon.  No associated 

inflammatory stranding.  No definite mural thickening.  No obstruction.

  Appendix:  Normal caliber.  No inflammation.



 PELVIS:

  Bladder:  Unremarkable.  No stones.

  Reproductive:  Unremarkable as visualized.



 ABDOMEN and PELVIS:

  Intraperitoneal space:  No significant fluid collection.  No free air.

  Bones/joints:  Moderate compression fractures T9, T11, T12 vertebral bodies, 

acute or subacute.  Mild compression fracture L4 vertebral body, chronic.

  Soft tissues:  Unremarkable.

  Vasculature:  Moderate atherosclerotic disease.  No aneurysm.  IVC filter.

  Lymph nodes:  No pathologically enlarged lymph nodes.



IMPRESSION:     

1.  No definite CT evidence of urolithiasis.

2.  Incidental/non-acute findings are described above.

## 2018-02-11 NOTE — CP.PCM.HP
History of Present Illness





- History of Present Illness


History of Present Illness: 


57 yr old F presented to ED with complaint of worsening chronic mid back pain 

and right flank pain x 3 weeks. PMHx includes DVT, Pulmonary embolism, IVC 

filter, HTN, COPD, HLD, PVD, chronic back pain s/p multiple compression 

fractures of thoracic spine, prediabetes, tobacco abuse and obesity. Mid-back 

pain is 6/10, radiates to right flank, alleviated by medication (oxycodone), 

worsened by certain movements. Denies hematuria, dysuria, chest pain, SOB, 

weakness, dizziness, urinary or fecal incontinence. At baseline patient uses a 

walker or a cane to ambulate. Patient reports she had recent imaging on 18 

and has to followup with her orthopedic spine surgeon. 





PMD: Dr. Mittal


Specialists: Kingston Patel-orthopedic spine surgeon [phone]; Yoav Lincoln-pain management; Dr. Cazares-cardiologist, Dr. Escobar-

pulmonologist; Dr. Ybarra-neurologist





PMHx:DVT, Pulmonary embolism, IVC filter, HTN, COPD, HLD, PVD, chronic back 

pain s/p multiple compression fractures of thoracic spine, prediabetes, tobacco 

abuse and obesity


SurgHx: b/l foot surgery, left ankle fracture repaired, right foot fracture 

repair (screws in place), BTL, 


FMHx: mother  at 73 from stroke, Father  at 49 from complications of 

multiple myeloma, sister  at 59 from breast cancer, rest of siblings are 

healthy


SocHx: current tobacco abuse-42 pack years, denies Etoh or drugs, lives alone, 

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


Medications: Xanax 1mg PO QID, ASA 81mg PO QD, Lipitor 40mg PO QHS, Vit D 50,

000 unit PO weekly, Escitalopram 20mg PO QD, Folic Acid 1 mg PO QD, Gabapentin 

400mg PO TID, Magnesoum Oxide 500mg PO QD, Metformin 1,000 mg PO QD, Omeprazole 

40mg PO QD, Oxycodine 15mg PO QID PRN pain, K-Dur 20 meq PO QD, Xarelto 20mg PO 

QD, Topiramate 25mg PO QHS, Trazodone 200mg PO QHS, Zolpidem Tartrate 10mg PO 

QHS


Allergies: NKDA





ED course: vital signs wnl, O2 sat 100% on room air


-CBC wnl, CMP: K+ 5.9, AST 44, random glucose 110 mg/dL, rest of CMP wnl, UA 

negative for infection


-Abd/Pelvis CT: no definite CT evidence of urolithiasis, multiple compression 

fractures of throacic and lumbar spine


-Radiology Dr. Neal informed ER physician and PA of Thoracic Spine CT 

findings from 18: New extensive central compression deformity of T12 

vertebral body with possible complete fracture. Gas seen within intervertebral 

disc is likely vacuum disc phenomenom associated with compression fracture, 

possibility of osteomyelitis must be considered (see full report)











Present on Admission





- Present on Admission


Any Indicators Present on Admission: Yes


History of DVT/PE: Yes


History of Uncontrolled Diabetes: No


Urinary Catheter: No


Decubitus Ulcer Present: No


History Surgical Site Infection Following: None





Review of Systems





- Constitutional


Constitutional: absent: Chills, Night Sweats, Weight Loss





- EENT


Eyes: absent: Blurred Vision, Change in Vision


Ears: absent: Ear Discharge, Ear Pain


Nose/Mouth/Throat: absent: Nasal Congestion, Nasal Discharge





- Cardiovascular


Cardiovascular: absent: Chest Pain, Dyspnea





- Respiratory


Respiratory: Cough (chronic wet ).  absent: Hemoptysis





- Gastrointestinal


Gastrointestinal: absent: Abdominal Pain, Nausea, Vomiting





- Genitourinary


Genitourinary: absent: Difficulty Urinating, Dysuria





- Musculoskeletal


Musculoskeletal: Back Pain (mid back radiating to right flank).  absent: Neck 

Pain, Numbness





- Neurological


Neurological: Abnormal Gait (uses walker or cane).  absent: Confusion





- Psychiatric


Psychiatric: absent: Anxiety





- Endocrine


Endocrine: absent: Palpitations, Polydipsia, Polyphagia





- Hematologic/Lymphatic


Hematologic: absent: Easy Bleeding, Easy Bruising





Past Patient History





- Infectious Disease


Hx of Infectious Diseases: None





- Tetanus Immunizations


Tetanus Immunization: Unknown





- Past Medical History & Family History


Past Medical History?: Yes





- Past Social History


Alcohol: None


Drugs: Denies





- CARDIAC


Hx Hypercholesterolemia: Yes


Hx Hypertension: Yes





- PULMONARY


Hx Asthma: Yes


Hx Bronchitis: Yes


Hx Chronic Obstructive Pulmonary Disease (COPD): Yes (currently takin xarelto)


Hx Pneumonia: Yes


Hx Pulmonary Embolism: Yes





- NEUROLOGICAL


Hx Neurological Disorder: No





- HEENT


Hx HEENT Problems: No


Other/Comment: wears glasses





- RENAL


Hx Chronic Kidney Disease: No





- ENDOCRINE/METABOLIC


Hx Endocrine Disorders: Yes


Hx Diabetes Mellitus Type 2: Yes





- HEMATOLOGICAL/ONCOLOGICAL


Hx Human Immunodeficiency Virus (HIV): No





- INTEGUMENTARY


Hx Dermatological Problems: No





- MUSCULOSKELETAL/RHEUMATOLOGICAL


Hx Arthritis: Yes


Hx Rheumatoid Arthritis: Yes





- GASTROINTESTINAL


Hx Gastrointestinal Disorders: No


Hx Ulcer: Yes


Other/Comment: HX GI BLEED





- GENITOURINARY/GYNECOLOGICAL


Hx Genitourinary Disorders: No


Hx Incontinence: Yes





- PSYCHIATRIC


Hx Anxiety: Yes


Hx Depression: Yes





- SURGICAL HISTORY


Hx Coronary Stent: Yes (10/2013)


Hx Tonsillectomy: Yes





- ANESTHESIA


Hx Anesthesia: Yes


Hx Anesthesia Reactions: Yes (difficult to wake up)


Hx Malignant Hyperthermia: No





Meds


Allergies/Adverse Reactions: 


 Allergies











Allergy/AdvReac Type Severity Reaction Status Date / Time


 


No Known Allergies Allergy   Verified 17 15:08














Physical Exam





- Constitutional


Appears: No Acute Distress (obese, strong tobacco odor)





- Head Exam


Head Exam: ATRAUMATIC, NORMOCEPHALIC





- Eye Exam


Eye Exam: EOMI, PERRL





- ENT Exam


ENT Exam: Mucous Membranes Moist





- Neck Exam


Neck exam: Positive for: Full Rom.  Negative for: Lymphadenopathy





- Respiratory Exam


Respiratory Exam: Clear to Auscultation Bilateral, NORMAL BREATHING PATTERN.  

absent: Rales, Rhonchi, Wheezes





- Cardiovascular Exam


Cardiovascular Exam: REGULAR RHYTHM, +S1, +S2





- GI/Abdominal Exam


GI & Abdominal Exam: Normal Bowel Sounds, Soft (obese).  absent: Distended





- Extremities Exam


Extremities exam: Positive for: full ROM.  Negative for: calf tenderness, pedal 

edema





- Back Exam


Back exam: tenderness (mild tenderness to palpation along thoracic spine).  

absent: CVA tenderness (L), CVA tenderness (R)





- Neurological Exam


Neurological exam: Alert, CN II-XII Intact, Oriented x3





- Psychiatric Exam


Psychiatric exam: Normal Affect, Normal Mood





- Skin


Skin Exam: Dry, Normal Color, Warm





Results





- Vital Signs


Recent Vital Signs: 





 Last Vital Signs











Temp  98.2 F   18 15:49


 


Pulse  77   18 15:49


 


Resp  18   18 15:49


 


BP  141/78   18 15:49


 


Pulse Ox  99   18 19:38














- Labs


Result Diagrams: 


 18 17:15





 18 17:15


Labs: 





 Laboratory Results - last 24 hr











  18





  17:15 17:15 17:24


 


WBC  7.8  


 


RBC  4.93  


 


Hgb  15.1  


 


Hct  45.5  


 


MCV  92.4  


 


MCH  30.6  


 


MCHC  33.2  


 


RDW  14.6 H  


 


Plt Count  183  


 


MPV  9.7  


 


Neut % (Auto)  57.6  


 


Lymph % (Auto)  30.8  


 


Mono % (Auto)  7.9  


 


Eos % (Auto)  2.4  


 


Baso % (Auto)  1.3  


 


Neut # (Auto)  4.5  


 


Lymph # (Auto)  2.4  


 


Mono # (Auto)  0.6  


 


Eos # (Auto)  0.2  


 


Baso # (Auto)  0.1  


 


Sodium   137 


 


Potassium   5.9 H 


 


Chloride   100 


 


Carbon Dioxide   27 


 


Anion Gap   16 


 


BUN   9 


 


Creatinine   0.8 


 


Est GFR ( Amer)   > 60 


 


Est GFR (Non-Af Amer)   > 60 


 


Random Glucose   110 H 


 


Calcium   9.6 


 


Total Bilirubin   1.2 


 


AST   44 H 


 


ALT   24 


 


Alkaline Phosphatase   141 H 


 


Total Protein   8.4 H 


 


Albumin   4.3 


 


Globulin   4.0 H 


 


Albumin/Globulin Ratio   1.1 


 


Urine Color    Yellow


 


Urine Clarity    Clear


 


Urine pH    7.0


 


Ur Specific Gravity    1.009


 


Urine Protein    Negative


 


Urine Glucose (UA)    Neg


 


Urine Ketones    Negative


 


Urine Blood    Negative


 


Urine Nitrate    Negative


 


Urine Bilirubin    Negative


 


Urine Urobilinogen    0.2-1.0


 


Ur Leukocyte Esterase    Neg


 


Urine RBC (Auto)    < 1


 


Urine Microscopic WBC    < 1


 


Ur Squamous Epith Cells    2


 


Urine Bacteria    Rare














Assessment & Plan





- Assessment and Plan (Free Text)


Assessment: 


57 yr old F admitted for worsening acute on chronic back pain with Thoracic CT 

findings of new extensive central compression deformity of T12 vertebral body 

with possible complete fracture and/or osteomyelitis. 





1. Severe central compression T12 with possible complete fracture, possible 

osteomyelitis


-no leukocytosis, moderate back pain 


-admit to med/surg


-no weight bearing


-pain management


-will contact pt's Orthopedic Spine Surgeon-Dr. Lima; consider transfer or 

management by on-call neurosurgery


-consider gadolinium enhanced Thoracic MRI 


-consider ID for possible osteomyelitis





2. Hyperkalemia


-K+ 5.9 on hemolyzed blood sample in ED


-patient refused repeat BMP


-hold home med K-Dur 20mEq


-f/u next day BMP





3. Hypertension


-chronic, controlled


-heart healthy diet


-continue ASA, Lipitor





4. COPD


-chronic, controlled





5. DVT prophylaxis


-patient on Xarelto for hx of DVT and PE








- Date & Time


Date: 18


Time: 21:28

## 2018-02-11 NOTE — ED PDOC
HPI: Abdomen


Time Seen by Provider: 02/11/18 16:08


Chief Complaint (Nursing): Abdominal Pain


Chief Complaint (Provider): Abdominal pain


History Per: Patient


Additional Complaint(s): 





Emelina Weaver, a 56 year old female, who has a past medical history of COPD, 

chronic back pain, arthritis and pulmonary embolism presents to the ED 

complaining of right sided flank pain x 3 weeks now. No Hematuria, no dysuria. 

no fever or chills. nuasea or vomiting. 














Past Medical History


Reviewed: Historical Data, Nursing Documentation, Vital Signs


Vital Signs: 


 Last Vital Signs











Temp  98.2 F   02/11/18 15:49


 


Pulse  77   02/11/18 15:49


 


Resp  18   02/11/18 15:49


 


BP  141/78   02/11/18 15:49


 


Pulse Ox  99   02/11/18 18:57














- Medical History


PMH: Anxiety, Arthritis, Asthma, Back Problems (Chronic back pain), Bronchitis, 

CAD, COPD (currently takin xarelto), Depression, Diabetes, Deep Vein Thrombosis

, HTN, Hypercholesterolemia, Pneumonia, Pulmonary Embolism, Rheumatoid Arthritis


   Denies: HIV, Chronic Kidney Disease





- Surgical History


Surgical History: Coronary Stent (10/2013), Endoscopy, Tonsillectomy





- Family History


Family History: States: Stroke





- Living Arrangements


Living Arrangements: With Family





- Social History


Current smoker - smoking cessation education provided: No


Alcohol: None


Drugs: Denies





- Immunization History


Hx Tetanus Toxoid Vaccination: No


Hx Influenza Vaccination: Yes


Hx Pneumococcal Vaccination: No





- Home Medications


Home Medications: 


 Ambulatory Orders











 Medication  Instructions  Recorded


 


Omeprazole 40 mg PO DAILY 04/16/15


 


ALPRAZolam [Xanax] 1 mg PO QID #0 tab 05/25/15


 


Aripiprazole [Abilify] 10 mg PO DAILY #0 tab 05/25/15


 


Ergocalciferol [Vitamin D] 50,000 unit PO QWK #0 sgl 05/25/15


 


Escitalopram [Lexapro] 20 mg PO DAILY #0 tab 05/25/15


 


Folic Acid 1 mg PO DAILY #0 tab 05/25/15


 


Furosemide [Lasix] 40 mg PO DAILY #0 tab 05/25/15


 


Lovastatin 10 mg PO DAILY #0 tab 05/25/15


 


Aspirin [Aspirin EC] 81 mg PO DAILY 06/27/15


 


Albuterol Sulfate [Proair Hfa] 2 puff IH Q4H PRN 10/12/15


 


Albuterol/Ipratropium [Duoneb 3 3 ml IH DAILY 10/12/15





mg/0.5 mg (3 ml) UD]  


 


Mometasone/Formoterol [Dulera 200 2 puff INH DAILY 10/12/15





Mcg/5 Mcg Inhaler]  


 


Montelukast [Singulair] 10 mg PO HS 10/12/15


 


Metformin Hydrochloride [Metformin] 1,000 mg PO DAILY 03/02/16


 


Topiramate [Topamax] 25 mg PO DAILY 03/02/16


 


Potassium Chloride [K-Dur 20 mEq 20 meq PO DAILY 03/30/16





ER Tab]  


 


traZODone [Desyrel] 200 mg PO HS 03/30/16


 


Topiramate [Topamax] 50 mg PO HS 12/18/16


 


Zolpidem Tartrate [Ambien] 10 mg PO HS 12/18/16


 


oxyCODONE [oxyCODONE Immediate 15 mg PO QID PRN 12/18/16





Release Tab]  


 


Morphine [Morphine Extended 30 mg PO BID #14 tabsr 12/20/16





Release Tab]  


 


Nystatin [Nystatin Oral Susp] 5 ml PO QID #480 udc 12/20/16


 


Rivaroxaban [Xarelto] 20 mg PO DAILY  tab 12/20/16


 


predniSONE [predniSONE Tab] 5 mg PO DAILY #63 tab 12/20/16


 


Gabapentin [Neurontin] 400 mg PO TID 05/10/17


 


traMADol [Ultram] 50 mg PO TID PRN #12 tab 07/29/17


 


Ibuprofen [Motrin] 600 mg PO Q6 #20 tab 12/26/17


 


Oxycodone HCl/Acetaminophen 1 each PO Q4 #10 tablet 12/26/17





[Percocet  mg Tablet]  














- Allergies


Allergies/Adverse Reactions: 


 Allergies











Allergy/AdvReac Type Severity Reaction Status Date / Time


 


No Known Allergies Allergy   Verified 07/29/17 15:08














Review of Systems


ROS Statement: Except As Marked, All Systems Reviewed And Found Negative


Musculoskeletal: Positive for: Back Pain (flank pain)





Physical Exam





- Reviewed


Nursing Documentation Reviewed: Yes


Vital Signs Reviewed: Yes





- Physical Exam


Appears: Positive for: Well, Non-toxic, No Acute Distress


Head Exam: Positive for: ATRAUMATIC, NORMAL INSPECTION, NORMOCEPHALIC


Skin: Positive for: Normal Color, Warm, DRY


Eye Exam: Positive for: EOMI, Normal appearance, PERRL


ENT: Positive for: Normal ENT Inspection


Neck: Positive for: Normal, Painless ROM


Cardiovascular/Chest: Positive for: Regular Rate, Rhythm


Respiratory: Positive for: CNT, Normal Breath Sounds


Gastrointestinal/Abdominal: Positive for: Bowel Sounds, Soft, Tenderness (right 

sided flank tenderness, (+) CVA).  Negative for: Guarding


Back: Positive for: Normal Inspection, R CVA Tenderness


Extremity: Positive for: Normal ROM


Neurologic/Psych: Positive for: Alert, Oriented





- Laboratory Results


Result Diagrams: 


 02/11/18 17:15





 02/11/18 17:15





- ECG


O2 Sat by Pulse Oximetry: 99





Medical Decision Making


Medical Decision Making: 








IV access established and diagnostics ordered








labs resulted and reviewed and demonstrated full understanding





EXAM:


CT Abdomen and Pelvis Without Intravenous Contrast


CLINICAL HISTORY:


57 years old, female; Pain; Abdominal pain; Flank; Right; Additional info: 

Renal colic on right


TECHNIQUE:


Axial computed tomography images of the abdomen and pelvis without intravenous 

contrast. All CT


scans at this facility use one or more dose reduction techniques, viz.: 

automated exposure control;


ma/kV adjustment per patient size (including targeted exams where dose is 

matched to indication; i.e.


head); or iterative reconstruction technique.


Coronal and sagittal reformatted images were created and reviewed.


COMPARISON:


CT - ABD PELVIS PO CONTRAST ONLY 2016-06-08 12:51


FINDINGS:


Lower thorax: Minimal atelectasis/scarring.


ABDOMEN:


Liver: Unremarkable.


Gallbladder and bile ducts: No calcified stones. No ductal dilation.


Pancreas: Unremarkable. No ductal dilation.


Spleen: No splenomegaly.


Adrenals: No mass.


Kidneys and ureters: Scarring of left kidney. No renal calculi. No 

hydronephrosis.


Stomach and bowel: Few scattered diverticula within colon. No associated 

inflammatory stranding.


No definite mural thickening. No obstruction.


Appendix: Normal caliber. No inflammation.


PELVIS:


Bladder: Unremarkable. No stones.


Reproductive: Unremarkable as visualized.


ABDOMEN and PELVIS:


Intraperitoneal space: No significant fluid collection. No free air.


Bones/joints: Moderate compression fractures T9, T11, T12 vertebral bodies, 

acute or subacute.


Mild compression fracture L4 vertebral body, chronic.


Soft tissues: Unremarkable.


EMELINA WEAVER Accession: [id number] | Final Radiology Report


CONFIDENTIALITY STATEMENT


This report is intended only for use by the referring physician, and only in 

accordance with law. If you received this in error, call [phone].


Page 2 of 2


Vasculature: Moderate atherosclerotic disease. No aneurysm. IVC filter.


Lymph nodes: No pathologically enlarged lymph nodes.


IMPRESSION:


1. No definite CT evidence of urolithiasis.


2. Incidental/non-acute findings are described above.





Results discussed with Pt who demonstrated full understanding. 


Pt reports that she is followed by pain management Dr. Camacho and is also 

being followed by spinal specialist, Dr. Rodríguez. Pt reports she has 

appointments for both in the next week. 





Pt reports increased pain after CT scan. Given 4 mg Morphine IV. 





Case endorsed to JAZZ Medina at 2000 pending re-eval. 





Disposition





- Clinical Impression


Clinical Impression: 


 Back pain








- Patient ED Disposition


Is Patient to be Admitted: Transfer of Care





- Disposition


Disposition: Transfer of Care


Disposition Time: 19:37


Condition: STABLE


Forms:  [employer] (English)





- POA


Present On Arrival: None

## 2018-02-12 LAB
BUN SERPL-MCNC: 11 MG/DL (ref 7–17)
CALCIUM SERPL-MCNC: 9.1 MG/DL (ref 8.4–10.2)
GFR NON-AFRICAN AMERICAN: > 60

## 2018-02-12 RX ADMIN — OXYCODONE HYDROCHLORIDE AND ACETAMINOPHEN PRN TAB: 5; 325 TABLET ORAL at 02:15

## 2018-02-12 RX ADMIN — OXYCODONE HYDROCHLORIDE AND ACETAMINOPHEN PRN TAB: 5; 325 TABLET ORAL at 07:51

## 2018-02-12 RX ADMIN — OXYCODONE HYDROCHLORIDE AND ACETAMINOPHEN PRN TAB: 5; 325 TABLET ORAL at 21:50

## 2018-02-12 RX ADMIN — Medication SCH MG: at 09:32

## 2018-02-12 NOTE — CP.PCM.PN
<Samir Delgado - Last Filed: 02/12/18 14:23>





Subjective





- Date & Time of Evaluation


Date of Evaluation: 02/12/18


Time of Evaluation: 07:10





- Subjective


Subjective: 





The patient was seen and examined this morning bedside.  There are no acute 

events overnight.  The patient is laying in bed with discomfort due to chronic 

back pain.  The patient has no other complaints.  The patient's ortho-spine 

surgeon has been contacted.





Objective





- Vital Signs/Intake and Output


Vital Signs (last 24 hours): 


 











Temp Pulse Resp BP Pulse Ox


 


 98.5 F   58 L  20   136/69   94 L


 


 02/11/18 22:50  02/11/18 22:50  02/11/18 22:50  02/11/18 22:50  02/11/18 22:50











- Medications


Medications: 


 Current Medications





Alprazolam (Xanax)  1 mg PO QID [facility]


   Last Admin: 02/12/18 02:20 Dose:  1 mg


Aspirin (Ecotrin)  81 mg PO DAILY [facility]


Atorvastatin Calcium (Lipitor)  40 mg PO HS [facility]


   Last Admin: 02/12/18 00:55 Dose:  Not Given


Ergocalciferol (Drisdol 50,000 Intl Units Cap)  1 cap PO QWK [facility]


Escitalopram Oxalate (Lexapro)  20 mg PO DAILY [facility]


Folic Acid (Folic Acid)  1 mg PO DAILY [facility]


Gabapentin (Neurontin)  400 mg PO TID [facility]


Magnesium Oxide (Mag-Ox)  400 mg PO DAILY [facility]


Oxycodone/Acetaminophen (Percocet 5/325 Mg Tab)  1 tab PO Q4 PRN


   PRN Reason: Pain, moderate (4-7)


   Stop: 02/14/18 22:05


Oxycodone/Acetaminophen (Percocet 5/325 Mg Tab)  2 tab PO Q6 PRN


   PRN Reason: Pain, severe (8-10)


   Stop: 02/14/18 22:06


   Last Admin: 02/12/18 02:15 Dose:  2 tab


Pantoprazole Sodium (Protonix Ec Tab)  80 mg PO DAILY [facility]


Rivaroxaban (Xarelto)  20 mg PO DAILY [facility]


   PRN Reason: Protocol


Topiramate (Topamax)  25 mg PO HS [facility]


   Last Admin: 02/12/18 00:56 Dose:  Not Given


Trazodone HCl (Desyrel)  200 mg PO HS [facility]











- Labs


Labs: 


 





 02/11/18 17:15 





 02/12/18 05:40 











- Constitutional


Appears: No Acute Distress





- Head Exam


Head Exam: ATRAUMATIC, NORMAL INSPECTION, NORMOCEPHALIC





- Eye Exam


Eye Exam: Normal appearance





- ENT Exam


ENT Exam: Mucous Membranes Moist





- Neck Exam


Neck Exam: Full ROM.  absent: Tenderness





- Respiratory Exam


Respiratory Exam: Clear to Ausculation Bilateral, NORMAL BREATHING PATTERN.  

absent: Rales, Rhonchi, Wheezes, Respiratory Distress





- Cardiovascular Exam


Cardiovascular Exam: REGULAR RHYTHM.  absent: Tachycardia





- GI/Abdominal Exam


GI & Abdominal Exam: Soft, Normal Bowel Sounds.  absent: Distended, Tenderness





- Extremities Exam


Extremities Exam: Normal Inspection.  absent: Calf Tenderness, Pedal Edema, 

Tenderness





- Neurological Exam


Neurological Exam: Alert, Awake, Oriented x3





- Skin


Skin Exam: Dry, Intact, Normal Color, Warm





Assessment and Plan





- Assessment and Plan (Free Text)


Assessment: 





56 y/o woman w/ pmh of COPD, chronic back pain (followed by pain management), PE

, and HTN admitted for worsening acute on chronic back pain with Thoracic CT 

findings of new extensive central compression deformity of T12 vertebral body 

with possible complete fracture and/or osteomyelitis. 


Plan: 





Severe central compression T12 with possible complete fracture, possible 

osteomyelitis


- no leukocytosis, moderate back pain 


- no weight bearing


- CT abd and pelvis w/o contrast: moderate compression fractures of T9, T11, 

T12 vertebral bodies; chronic mild compression fracture L4 vertebral body


- pain management: percocet 1 tab PO Q4 prn for moderate pain, 2 tabs PO Q6 prn 

for severe pain


- contacted ortho-spine surgeon, Dr. Lima


- consider gadolinium enhanced Thoracic MRI 


- Infectious disease consulted, Dr Ralph





Hyperkalemia


- K+ 5.9 on hemolyzed blood sample in ED


- patient refused repeat BMP


- hold home med K-Dur 20mEq


- f/u next day BMP





Hypertension


- chronic, controlled


- heart healthy diet


- continue ASA, Lipitor





COPD


- chronic, controlled





Hx of PE


- IVC filter in place


- on Xarelto





DVT prophylaxis


- patient on Xarelto for hx of DVT and PE








<Tian Simmons - Last Filed: 02/14/18 06:53>





Objective





- Vital Signs/Intake and Output


Vital Signs (last 24 hours): 


 











Temp Pulse Resp BP Pulse Ox


 


 97.8 F   61   18   105/63   99 


 


 02/13/18 08:28  02/13/18 08:28  02/13/18 08:28  02/13/18 08:28  02/13/18 08:28








Intake and Output: 


 











 02/13/18 02/13/18





 06:59 18:59


 


Intake Total 40 


 


Balance 40 














- Medications


Medications: 


 Current Medications





Alprazolam (Xanax)  1 mg PO BID PRN


   PRN Reason: Anxiety


   Last Admin: 02/13/18 01:44 Dose:  1 mg


Aspirin (Ecotrin)  81 mg PO DAILY [facility]


   Last Admin: 02/13/18 09:19 Dose:  81 mg


Atorvastatin Calcium (Lipitor)  40 mg PO HS [facility]


   Last Admin: 02/12/18 21:47 Dose:  40 mg


Ergocalciferol (Drisdol 50,000 Intl Units Cap)  1 cap PO QWK [facility]


Escitalopram Oxalate (Lexapro)  20 mg PO DAILY [facility]


   Last Admin: 02/13/18 09:19 Dose:  20 mg


Folic Acid (Folic Acid)  1 mg PO DAILY [facility]


   Last Admin: 02/13/18 09:19 Dose:  1 mg


Gabapentin (Neurontin)  400 mg PO TID [facility]


   Last Admin: 02/13/18 09:20 Dose:  400 mg


Magnesium Oxide (Mag-Ox)  400 mg PO DAILY [facility]


   Last Admin: 02/13/18 09:19 Dose:  400 mg


Oxycodone/Acetaminophen (Percocet 5/325 Mg Tab)  1 tab PO Q4 PRN


   PRN Reason: Pain, moderate (4-7)


   Stop: 02/14/18 22:05


   Last Admin: 02/13/18 09:19 Dose:  1 tab


Oxycodone/Acetaminophen (Percocet 5/325 Mg Tab)  2 tab PO Q6 PRN


   PRN Reason: Pain, severe (8-10)


   Stop: 02/14/18 22:06


   Last Admin: 02/12/18 07:51 Dose:  2 tab


Pantoprazole Sodium (Protonix Ec Tab)  40 mg PO DAILY [facility]


   Last Admin: 02/13/18 09:20 Dose:  40 mg


Potassium Chloride (K-Dur 20 Meq Er Tab)  20 meq PO DAILY [facility]


Rivaroxaban (Xarelto)  20 mg PO DAILY [facility]


   PRN Reason: Protocol


   Last Admin: 02/13/18 09:20 Dose:  20 mg


Topiramate (Topamax)  25 mg PO HS [facility]


   Last Admin: 02/12/18 21:46 Dose:  25 mg


Trazodone HCl (Desyrel)  200 mg PO HS [facility]


   Last Admin: 02/12/18 21:46 Dose:  200 mg











- Labs


Labs: 


 





 02/13/18 05:30 





 02/13/18 05:30 











Attending/Attestation





- Attestation


I have personally seen and examined this patient.: Yes


I have fully participated in the care of the patient.: Yes


I have reviewed all pertinent clinical information, including history, physical 

exam and plan: Yes

## 2018-02-13 VITALS
TEMPERATURE: 97.8 F | HEART RATE: 61 BPM | OXYGEN SATURATION: 99 % | DIASTOLIC BLOOD PRESSURE: 63 MMHG | SYSTOLIC BLOOD PRESSURE: 105 MMHG

## 2018-02-13 VITALS — RESPIRATION RATE: 18 BRPM

## 2018-02-13 LAB
ALBUMIN SERPL-MCNC: 3.4 G/DL (ref 3.5–5)
ALBUMIN/GLOB SERPL: 1.1 {RATIO} (ref 1–2.1)
ALT SERPL-CCNC: 34 U/L (ref 9–52)
AST SERPL-CCNC: 18 U/L (ref 14–36)
BASOPHILS # BLD AUTO: 0.1 K/UL (ref 0–0.2)
BASOPHILS NFR BLD: 2.4 % (ref 0–2)
BUN SERPL-MCNC: 11 MG/DL (ref 7–17)
CALCIUM SERPL-MCNC: 8.9 MG/DL (ref 8.4–10.2)
EOSINOPHIL # BLD AUTO: 0.3 K/UL (ref 0–0.7)
EOSINOPHIL NFR BLD: 5.2 % (ref 0–4)
ERYTHROCYTE [DISTWIDTH] IN BLOOD BY AUTOMATED COUNT: 14.5 % (ref 11.5–14.5)
GFR NON-AFRICAN AMERICAN: > 60
HGB BLD-MCNC: 13.9 G/DL (ref 12–16)
LYMPHOCYTES # BLD AUTO: 2.2 K/UL (ref 1–4.3)
LYMPHOCYTES NFR BLD AUTO: 45.8 % (ref 20–40)
MCH RBC QN AUTO: 30.5 PG (ref 27–31)
MCHC RBC AUTO-ENTMCNC: 32.7 G/DL (ref 33–37)
MCV RBC AUTO: 93.1 FL (ref 81–99)
MONOCYTES # BLD: 0.5 K/UL (ref 0–0.8)
MONOCYTES NFR BLD: 10.5 % (ref 0–10)
NEUTROPHILS # BLD: 1.8 K/UL (ref 1.8–7)
NEUTROPHILS NFR BLD AUTO: 36.1 % (ref 50–75)
NRBC BLD AUTO-RTO: 0.1 % (ref 0–0)
PLATELET # BLD: 175 K/UL (ref 130–400)
PMV BLD AUTO: 9.5 FL (ref 7.2–11.7)
RBC # BLD AUTO: 4.56 MIL/UL (ref 3.8–5.2)
WBC # BLD AUTO: 4.9 K/UL (ref 4.8–10.8)

## 2018-02-13 RX ADMIN — OXYCODONE HYDROCHLORIDE AND ACETAMINOPHEN PRN TAB: 5; 325 TABLET ORAL at 13:24

## 2018-02-13 RX ADMIN — Medication SCH MG: at 09:19

## 2018-02-13 RX ADMIN — OXYCODONE HYDROCHLORIDE AND ACETAMINOPHEN PRN TAB: 5; 325 TABLET ORAL at 09:19

## 2018-02-13 NOTE — CP.PCM.PN
Subjective





- Date & Time of Evaluation


Date of Evaluation: 02/13/18


Time of Evaluation: 06:49





- Subjective


Subjective: 





The patient was seen and examined this morning bedside.  There are no acute 

events overnight.  The patient is laying in bed with discomfort due to chronic 

back pain.  The patient has no other complaints.  The patient was seen by ID.  








Objective





- Vital Signs/Intake and Output


Vital Signs (last 24 hours): 


 











Temp Pulse Resp BP Pulse Ox


 


 97.5 F L  50 L  18   127/68   95 


 


 02/13/18 02:00  02/13/18 02:00  02/13/18 02:00  02/13/18 02:00  02/13/18 02:00








Intake and Output: 


 











 02/12/18 02/13/18





 18:59 06:59


 


Intake Total  40


 


Balance  40














- Medications


Medications: 


 Current Medications





Alprazolam (Xanax)  1 mg PO BID PRN


   PRN Reason: Anxiety


   Last Admin: 02/13/18 01:44 Dose:  1 mg


Aspirin (Ecotrin)  81 mg PO DAILY [facility]


   Last Admin: 02/12/18 09:31 Dose:  81 mg


Atorvastatin Calcium (Lipitor)  40 mg PO HS [facility]


   Last Admin: 02/12/18 21:47 Dose:  40 mg


Ergocalciferol (Drisdol 50,000 Intl Units Cap)  1 cap PO QWK [facility]


Escitalopram Oxalate (Lexapro)  20 mg PO DAILY [facility]


   Last Admin: 02/12/18 09:32 Dose:  20 mg


Folic Acid (Folic Acid)  1 mg PO DAILY [facility]


   Last Admin: 02/12/18 09:32 Dose:  1 mg


Gabapentin (Neurontin)  400 mg PO TID [facility]


   Last Admin: 02/12/18 19:01 Dose:  400 mg


Magnesium Oxide (Mag-Ox)  400 mg PO DAILY [facility]


   Last Admin: 02/12/18 09:32 Dose:  400 mg


Oxycodone/Acetaminophen (Percocet 5/325 Mg Tab)  1 tab PO Q4 PRN


   PRN Reason: Pain, moderate (4-7)


   Stop: 02/14/18 22:05


   Last Admin: 02/12/18 21:50 Dose:  1 tab


Oxycodone/Acetaminophen (Percocet 5/325 Mg Tab)  2 tab PO Q6 PRN


   PRN Reason: Pain, severe (8-10)


   Stop: 02/14/18 22:06


   Last Admin: 02/12/18 07:51 Dose:  2 tab


Pantoprazole Sodium (Protonix Ec Tab)  40 mg PO DAILY [facility]


Rivaroxaban (Xarelto)  20 mg PO DAILY [facility]


   PRN Reason: Protocol


   Last Admin: 02/12/18 09:33 Dose:  20 mg


Topiramate (Topamax)  25 mg PO HS [facility]


   Last Admin: 02/12/18 21:46 Dose:  25 mg


Trazodone HCl (Desyrel)  200 mg PO HS [facility]


   Last Admin: 02/12/18 21:46 Dose:  200 mg











- Labs


Labs: 


 





 02/13/18 05:30 





 02/13/18 05:30 











- Constitutional


Appears: No Acute Distress





- Head Exam


Head Exam: ATRAUMATIC, NORMAL INSPECTION, NORMOCEPHALIC





- Eye Exam


Eye Exam: Normal appearance





- ENT Exam


ENT Exam: Mucous Membranes Moist





- Respiratory Exam


Respiratory Exam: Clear to Ausculation Bilateral, NORMAL BREATHING PATTERN.  

absent: Decreased Breath Sounds, Rales, Rhonchi, Wheezes, Respiratory Distress





- Cardiovascular Exam


Cardiovascular Exam: REGULAR RHYTHM.  absent: Tachycardia





- GI/Abdominal Exam


GI & Abdominal Exam: Soft, Normal Bowel Sounds.  absent: Distended, Tenderness





- Extremities Exam


Extremities Exam: Normal Inspection.  absent: Calf Tenderness, Pedal Edema, 

Tenderness





- Back Exam


Back Exam: muscle spasm, vertebral tenderness.  absent: rash noted





- Neurological Exam


Neurological Exam: Alert, Awake, Oriented x3





- Skin


Skin Exam: Dry, Intact, Normal Color, Warm





Assessment and Plan





- Assessment and Plan (Free Text)


Assessment: 





56 y/o woman w/ pmh of COPD, chronic back pain (followed by pain management), PE

, and HTN admitted for worsening acute on chronic back pain with Thoracic CT 

findings of new extensive central compression deformity of T12 vertebral body 

with possible complete fracture and/or osteomyelitis. 


Plan: 





Severe central compression T12 with possible complete fracture, possible 

osteomyelitis


- no leukocytosis, moderate back pain 


- no weight bearing


- CT abd and pelvis w/o contrast: moderate compression fractures of T9, T11, 

T12 vertebral bodies; chronic mild compression fracture L4 vertebral body


- pain management: percocet 1 tab PO Q4 prn for moderate pain, 2 tabs PO Q6 prn 

for severe pain


- contacted ortho-spine surgeon, Dr. Lima


- consider gadolinium enhanced Thoracic MRI 


- Infectious disease consulted, Dr Ralph, recommendations appreciated: f/u 

labs, no antibiotics at this time


- ortho-spine surgeon consulted, Dr. Kingston Lima


- Anesthesia consulted for pain management





Hyperkalemia - resolved


- K+ 5.9 on hemolyzed blood sample in ED


- K+ 3.9 this morning


- f/u next day BMP





Hypertension


- chronic, controlled


- heart healthy diet


- continue ASA, Lipitor





COPD


- chronic, controlled





Hx of PE


- IVC filter in place


- on Xarelto





DVT prophylaxis


- patient on Xarelto 20 mg PO daily for hx of DVT and PE





Dispo: After patient is seen by PT, patient is able to be discharged home, 

patient has appointment w/ Dr. Lima Friday February 16 @ 14:00

## 2018-02-13 NOTE — CP.PCM.DIS
Provider





- Provider


Date of Admission: 


02/11/18 20:46





Attending physician: 


Tian Simmons MD





Time Spent in preparation of Discharge (in minutes): 15





Diagnosis





- Discharge Diagnosis


(1) Back pain


Status: Chronic   





(2) Flank pain, acute


Status: Acute   





Hospital Course





- Lab Results


Lab Results: 


 Micro Results





02/11/18 17:24   Urine,Clean Catch   Urine Culture - Final


                            10-50,000 CFU/ML.


                            MULTIPLE SPECIES. PROBABLE CONTAMINATION.





 Most Recent Lab Values











WBC  4.9 K/uL (4.8-10.8)   02/13/18  05:30    


 


RBC  4.56 Mil/uL (3.80-5.20)   02/13/18  05:30    


 


Hgb  13.9 g/dL (12.0-16.0)   02/13/18  05:30    


 


Hct  42.5 % (34.0-47.0)   02/13/18  05:30    


 


MCV  93.1 fl (81.0-99.0)   02/13/18  05:30    


 


MCH  30.5 pg (27.0-31.0)   02/13/18  05:30    


 


MCHC  32.7 g/dL (33.0-37.0)  L  02/13/18  05:30    


 


RDW  14.5 % (11.5-14.5)   02/13/18  05:30    


 


Plt Count  175 K/uL (130-400)   02/13/18  05:30    


 


MPV  9.5 fl (7.2-11.7)   02/13/18  05:30    


 


Neut % (Auto)  36.1 % (50.0-75.0)  L  02/13/18  05:30    


 


Lymph % (Auto)  45.8 % (20.0-40.0)  H  02/13/18  05:30    


 


Mono % (Auto)  10.5 % (0.0-10.0)  H  02/13/18  05:30    


 


Eos % (Auto)  5.2 % (0.0-4.0)  H  02/13/18  05:30    


 


Baso % (Auto)  2.4 % (0.0-2.0)  H  02/13/18  05:30    


 


Neut # (Auto)  1.8 K/uL (1.8-7.0)   02/13/18  05:30    


 


Lymph # (Auto)  2.2 K/uL (1.0-4.3)   02/13/18  05:30    


 


Mono # (Auto)  0.5 K/uL (0.0-0.8)   02/13/18  05:30    


 


Eos # (Auto)  0.3 K/uL (0.0-0.7)   02/13/18  05:30    


 


Baso # (Auto)  0.1 K/uL (0.0-0.2)   02/13/18  05:30    


 


ESR  20 mm/hr (0-30)   02/13/18  05:30    


 


Sodium  136 mmol/l (132-148)   02/13/18  05:30    


 


Potassium  3.9 MMOL/L (3.6-5.0)   02/13/18  05:30    


 


Chloride  101 mmol/L ()   02/13/18  05:30    


 


Carbon Dioxide  26 mmol/L (22-30)   02/13/18  05:30    


 


Anion Gap  13  (10-20)   02/13/18  05:30    


 


BUN  11 mg/dl (7-17)   02/13/18  05:30    


 


Creatinine  0.8 mg/dl (0.7-1.2)   02/13/18  05:30    


 


Est GFR ( Amer)  > 60   02/13/18  05:30    


 


Est GFR (Non-Af Amer)  > 60   02/13/18  05:30    


 


Random Glucose  95 mg/dL ()   02/13/18  05:30    


 


Calcium  8.9 mg/dL (8.4-10.2)   02/13/18  05:30    


 


Total Bilirubin  0.3 mg/dl (0.2-1.3)   02/13/18  05:30    


 


AST  18 U/L (14-36)   02/13/18  05:30    


 


ALT  34 U/L (9-52)   02/13/18  05:30    


 


Alkaline Phosphatase  111 U/L ()   02/13/18  05:30    


 


Total Protein  6.6 G/DL (6.3-8.2)   02/13/18  05:30    


 


Albumin  3.4 g/dL (3.5-5.0)  L D 02/13/18  05:30    


 


Globulin  3.2 gm/dL (2.2-3.9)   02/13/18  05:30    


 


Albumin/Globulin Ratio  1.1  (1.0-2.1)   02/13/18  05:30    


 


Procalcitonin  0.05 NG/ML (0.19-0.49)  L  02/13/18  06:10    


 


Urine Color  Yellow  (YELLOW)   02/11/18  17:24    


 


Urine Clarity  Clear  (Clear)   02/11/18  17:24    


 


Urine pH  7.0  (5.0-8.0)   02/11/18  17:24    


 


Ur Specific Gravity  1.009  (1.003-1.030)   02/11/18  17:24    


 


Urine Protein  Negative mg/dL (NEGATIVE)   02/11/18  17:24    


 


Urine Glucose (UA)  Neg mg/dL (Normal)   02/11/18  17:24    


 


Urine Ketones  Negative mg/dL (NEGATIVE)   02/11/18  17:24    


 


Urine Blood  Negative  (NEGATIVE)   02/11/18  17:24    


 


Urine Nitrate  Negative  (NEGATIVE)   02/11/18  17:24    


 


Urine Bilirubin  Negative  (NEGATIVE)   02/11/18  17:24    


 


Urine Urobilinogen  0.2-1.0 mg/dL (0.2-1.0)   02/11/18  17:24    


 


Ur Leukocyte Esterase  Neg Joanne/uL (Negative)   02/11/18  17:24    


 


Urine RBC (Auto)  < 1 /hpf (0-3)   02/11/18  17:24    


 


Urine Microscopic WBC  < 1 /hpf (0-5)   02/11/18  17:24    


 


Ur Squamous Epith Cells  2 /hpf (0-5)   02/11/18  17:24    


 


Urine Bacteria  Rare  (<OCC)   02/11/18  17:24    














- Hospital Course


Hospital Course: 





56 y/o woman w/ pmh of COPD, chronic back pain (followed by pain management), PE

, and HTN admitted for worsening acute on chronic back pain with Thoracic CT 

findings of new extensive central compression deformity of T12 vertebral body 

with possible complete fracture and/or osteomyelitis.  Patient was seen in the 

ED, VS WNL; CBC WNL; CMP showed hyperkalemia but was hemolyzed sample; CT 

showed moderate compression fractures (T9, T11, T12) vertebral bodies, chronic 

mild compression fracture L4 vertebral body; nephro/urolithiasis ruled out on 

CT scan, osteomyelitis was also considered.  The patient complained of chronic 

back that is slightly worse than before but denies incontinence, saddle 

paresthesia, and lower extremity weakness.  The patient's abnormal labs have 

resolved.  Patient's orth-spine surgeon was contacted.  Patient reports she has 

an appointment / ortho-spine doctor on Friday 02/16/2018 @ 14:00.  The patient 

reports that her home is optimized for her condition and will follow up.  The 

patient was seen by physical therapy.  The patient was seen, examined, and 

deemed medically fit for discharge home.  The patient is to follow up w/ Dr. Simmons and has appointment w/ Dr Lima this Friday.





Discharge Exam





- Head Exam


Head Exam: ATRAUMATIC, NORMAL INSPECTION, NORMOCEPHALIC





- Eye Exam


Eye Exam: Normal appearance





- ENT Exam


ENT Exam: Mucous Membranes Moist





- Respiratory Exam


Respiratory Exam: Clear to PA & Lateral, NORMAL BREATHING PATTERN, 

UNREMARKABLE.  absent: Decreased Breath Sounds, Rales, Rhonchi, Wheezes, 

Respiratory Distress, Stridor





- Cardiovascular Exam


Cardiovascular Exam: REGULAR RHYTHM





- GI/Abdominal Exam


GI & Abdominal Exam: Normal Bowel Sounds, Soft.  absent: Distended, Tenderness





- Extremities Exam


Extremities exam: normal inspection





- Back Exam


Back exam: muscle spasm, vertebral tenderness.  absent: CVA tenderness (L), CVA 

tenderness (R), rash noted





- Neurological Exam


Neurological exam: Alert, CN II-XII Intact, Oriented x3





- Skin


Skin Exam: Dry, Intact, Normal Color, Warm





Discharge Plan





- Follow Up Plan


Condition: STABLE


Disposition: HOME/ ROUTINE


Instructions:  Back Pain (ED), Back Pain (GEN)


Additional Instructions: 


follow up with your primary MD 7-10 days


Referrals: 


Tian Simmons MD [Staff Provider] - 


Kingston Lima MD [Medical Doctor] -

## 2018-02-13 NOTE — CARD
--------------- APPROVED REPORT --------------





EKG Measurement

Heart [id number]

VT 130P0

[id number]

[id number]

[id number]



<Conclusion>

Sinus bradycardia

Otherwise normal ECG

## 2018-12-05 ENCOUNTER — HOSPITAL ENCOUNTER (EMERGENCY)
Dept: HOSPITAL 14 - H.ER | Age: 58
Discharge: LEFT BEFORE DISCHARGE | End: 2018-12-05
Payer: MEDICARE

## 2018-12-05 VITALS
SYSTOLIC BLOOD PRESSURE: 131 MMHG | TEMPERATURE: 98.4 F | OXYGEN SATURATION: 94 % | HEART RATE: 84 BPM | RESPIRATION RATE: 18 BRPM | DIASTOLIC BLOOD PRESSURE: 77 MMHG

## 2018-12-05 VITALS — BODY MASS INDEX: 34.9 KG/M2

## 2018-12-05 DIAGNOSIS — E11.9: ICD-10-CM

## 2018-12-05 DIAGNOSIS — G89.29: ICD-10-CM

## 2018-12-05 DIAGNOSIS — Z79.82: ICD-10-CM

## 2018-12-05 DIAGNOSIS — Z86.59: ICD-10-CM

## 2018-12-05 DIAGNOSIS — Z95.5: ICD-10-CM

## 2018-12-05 DIAGNOSIS — M54.6: Primary | ICD-10-CM

## 2018-12-05 DIAGNOSIS — J44.9: ICD-10-CM

## 2018-12-05 DIAGNOSIS — F17.200: ICD-10-CM

## 2018-12-05 DIAGNOSIS — M06.9: ICD-10-CM

## 2018-12-05 DIAGNOSIS — R30.0: ICD-10-CM

## 2018-12-05 DIAGNOSIS — Z86.718: ICD-10-CM

## 2018-12-05 DIAGNOSIS — Z86.711: ICD-10-CM

## 2018-12-05 DIAGNOSIS — Z79.84: ICD-10-CM

## 2018-12-05 DIAGNOSIS — I10: ICD-10-CM

## 2018-12-05 NOTE — ED PDOC
HPI: Female  Pain


Time Seen by Provider: 12/05/18 11:58


Chief Complaint (Nursing): Female Genitourinary


Chief Complaint (Provider): Mid back pain, unable to urinate 


History Per: Patient


History/Exam Limitations: no limitations


Onset/Duration Of Symptoms: Days


Current Symptoms Are (Timing): Still Present


Additional Complaint(s): 





59 yo female with history of HTN, chronic back pain, high cholesterol, COPD, CAD

and current smoker presents for evaluation of lower abdominal pain and back 

pain. Pt states she has not been able to eat or drinking due to decreased appeti

te. Denies N/V. Pt also states she has not urinated in 1 week. Pt told RN in 

triage it has been 3 days. Pt states she does not feel her back pain is related 

to urinary symptoms and feels similar to her chronic back pain. Normal BM. 





Past Medical History


Reviewed: Historical Data, Nursing Documentation, Vital Signs


Vital Signs: 





                                Last Vital Signs











Temp  98.4 F   12/05/18 11:12


 


Pulse  84   12/05/18 11:12


 


Resp  18   12/05/18 11:12


 


BP  131/77   12/05/18 11:12


 


Pulse Ox  94 L  12/05/18 11:12














- Medical History


PMH: Anxiety, Arthritis, Asthma, Back Problems (Chronic back pain), Bronchitis, 

CAD, COPD (currently takin xarelto), Depression, Diabetes, Deep Vein Thrombosis,

Fractures (R ankle fx), HTN, Hypercholesterolemia, Pneumonia, Pulmonary 

Embolism, Rheumatoid Arthritis


   Denies: HIV, Chronic Kidney Disease





- Surgical History


Surgical History: Coronary Stent (10/2013), Endoscopy, Tonsillectomy





- Family History


Family History: States: Stroke





- Living Arrangements


Living Arrangements: With Family





- Social History


Current smoker - smoking cessation education provided: Yes





- Immunization History


Hx Tetanus Toxoid Vaccination: No


Hx Influenza Vaccination: Yes


Hx Pneumococcal Vaccination: No





- Home Medications


Home Medications: 


                                Ambulatory Orders











 Medication  Instructions  Recorded


 


Omeprazole 40 mg PO DAILY 04/16/15


 


ALPRAZolam [Xanax] 1 mg PO QID #0 tab 05/25/15


 


Ergocalciferol [Vitamin D] 50,000 unit PO QWK #0 sgl 05/25/15


 


Escitalopram [Lexapro] 20 mg PO DAILY #0 tab 05/25/15


 


Folic Acid 1 mg PO DAILY #0 tab 05/25/15


 


Aspirin [Aspirin EC] 81 mg PO DAILY 06/27/15


 


Metformin Hydrochloride [Metformin] 1,000 mg PO DAILY 03/02/16


 


Potassium Chloride [K-Dur 20 mEq 20 meq PO DAILY 03/30/16





ER Tab]  


 


traZODone [Desyrel] 200 mg PO HS 03/30/16


 


Topiramate [Topamax] 25 mg PO HS 12/18/16


 


Zolpidem Tartrate [Ambien] 10 mg PO HS 12/18/16


 


oxyCODONE [oxyCODONE Immediate 15 mg PO QID PRN 12/18/16





Release Tab]  


 


Rivaroxaban [Xarelto] 20 mg PO DAILY  tab 12/20/16


 


Gabapentin [Neurontin] 400 mg PO TID 05/10/17


 


Atorvastatin [Lipitor] 40 mg PO DAILY 02/11/18


 


Escitalopram [Lexapro] 20 mg PO DAILY 02/11/18


 


Magnesium Oxide [Magnesium] 500 mg PO DAILY 02/11/18














- Allergies


Allergies/Adverse Reactions: 


                                    Allergies











Allergy/AdvReac Type Severity Reaction Status Date / Time


 


No Known Allergies Allergy   Verified 07/29/17 15:08














Review of Systems


ROS Statement: Except As Marked, All Systems Reviewed And Found Negative


Constitutional: Negative for: Fever, Chills


Cardiovascular: Negative for: Chest Pain, Palpitations


Gastrointestinal: Positive for: Abdominal Pain.  Negative for: Nausea, Vomiting


Genitourinary Female: Positive for: Hematuria, Pelvic Pain.  Negative for: 

Dysuria, Frequency, Vaginal Bleeding





Physical Exam





- Reviewed


Nursing Documentation Reviewed: Yes


Vital Signs Reviewed: Yes





- Physical Exam


Appears: Positive for: Well, Non-toxic, No Acute Distress


Head Exam: Positive for: ATRAUMATIC, NORMAL INSPECTION, NORMOCEPHALIC


Skin: Positive for: Normal Color, Warm, DRY


Eye Exam: Positive for: Normal appearance


ENT: Positive for: Normal ENT Inspection


Neck: Positive for: Normal, Painless ROM


Cardiovascular/Chest: Positive for: Regular Rate, Rhythm


Respiratory: Positive for: CNT, Normal Breath Sounds


Gastrointestinal/Abdominal: Positive for: Soft, Tenderness.  Negative for: 

Normal Exam (Mild suprapubic )


Back: Positive for: Normal Inspection


Extremity: Positive for: Normal ROM


Neurologic/Psych: Positive for: Alert, Oriented





- ECG


O2 Sat by Pulse Oximetry: 94





Medical Decision Making


Medical Decision Making: 





Discussed labs, bladder scan and possible curtis. 





Informed by RN that patient was not in room when she went in to get labs. 





Disposition





- Clinical Impression


Clinical Impression: 


 Chronic pain, Inability to urinate








- Patient ED Disposition


Is Patient to be Admitted: No





- Disposition


Disposition: Routine/Home


Disposition Time: 13:20


Condition: STABLE


Forms:  [employer] (English)

## 2019-01-11 ENCOUNTER — HOSPITAL ENCOUNTER (INPATIENT)
Dept: HOSPITAL 14 - H.OPSURG | Age: 59
LOS: 3 days | Discharge: NURSING, REHAB OR LONG-TERM CARE | DRG: 470 | End: 2019-01-14
Attending: FAMILY MEDICINE | Admitting: FAMILY MEDICINE
Payer: MEDICARE

## 2019-01-11 VITALS — BODY MASS INDEX: 34.4 KG/M2

## 2019-01-11 DIAGNOSIS — M17.11: Primary | ICD-10-CM

## 2019-01-11 DIAGNOSIS — F32.9: ICD-10-CM

## 2019-01-11 DIAGNOSIS — F41.9: ICD-10-CM

## 2019-01-11 DIAGNOSIS — E78.5: ICD-10-CM

## 2019-01-11 DIAGNOSIS — I10: ICD-10-CM

## 2019-01-11 DIAGNOSIS — E66.9: ICD-10-CM

## 2019-01-11 DIAGNOSIS — E11.9: ICD-10-CM

## 2019-01-11 DIAGNOSIS — E78.00: ICD-10-CM

## 2019-01-11 DIAGNOSIS — J44.9: ICD-10-CM

## 2019-01-11 DIAGNOSIS — Z86.711: ICD-10-CM

## 2019-01-11 PROCEDURE — 0SRC0J9 REPLACEMENT OF RIGHT KNEE JOINT WITH SYNTHETIC SUBSTITUTE, CEMENTED, OPEN APPROACH: ICD-10-PCS | Performed by: SPECIALIST

## 2019-01-11 RX ADMIN — OXYCODONE HYDROCHLORIDE SCH MG: 10 TABLET, FILM COATED, EXTENDED RELEASE ORAL at 22:16

## 2019-01-11 RX ADMIN — CEFAZOLIN SODIUM SCH MLS/HR: 2 SOLUTION INTRAVENOUS at 17:26

## 2019-01-11 RX ADMIN — IPRATROPIUM BROMIDE AND ALBUTEROL SULFATE SCH: .5; 3 SOLUTION RESPIRATORY (INHALATION) at 16:35

## 2019-01-11 RX ADMIN — HYDROMORPHONE HYDROCHLORIDE PRN MG: 1 INJECTION, SOLUTION INTRAMUSCULAR; INTRAVENOUS; SUBCUTANEOUS at 11:40

## 2019-01-11 RX ADMIN — HYDROMORPHONE HYDROCHLORIDE PRN MG: 1 INJECTION, SOLUTION INTRAMUSCULAR; INTRAVENOUS; SUBCUTANEOUS at 10:55

## 2019-01-11 RX ADMIN — CEFAZOLIN SODIUM SCH: 2 SOLUTION INTRAVENOUS at 17:28

## 2019-01-11 RX ADMIN — HYDROMORPHONE HYDROCHLORIDE PRN MG: 1 INJECTION, SOLUTION INTRAMUSCULAR; INTRAVENOUS; SUBCUTANEOUS at 11:55

## 2019-01-11 RX ADMIN — IPRATROPIUM BROMIDE AND ALBUTEROL SULFATE SCH ML: .5; 3 SOLUTION RESPIRATORY (INHALATION) at 19:12

## 2019-01-11 NOTE — PCM.SURG1
Surgeon's Initial Post Op Note





- Surgeon's Notes


Surgeon: Michel Snow MD 


Assistant: Dameon Lambert PA-C; Echo Gill


Type of Anesthesia: General Endo


Pre-Operative Diagnosis: Right knee OA


Operative Findings: See op report


Post-Operative Diagnosis: same as pre-op dx


Operation Performed: R TKR


Specimen/Specimens Removed: right knee bone and soft tissue


Estimated Blood Loss: EBL {In ML}: 50


Date of Surgery/Procedure: 01/11/19


Time of Surgery/Procedure: 08:30

## 2019-01-11 NOTE — OP
PROCEDURE DATE:  01/11/2019



PREOPERATIVE DIAGNOSIS:  Right knee osteoarthritis.



POSTOPERATIVE DIAGNOSIS:  Right knee osteoarthritis.



PROCEDURE:  Right total knee replacement.



ATTENDING PHYSICIAN:  Michel Snow MD.



ASSISTANT:  Dameon Lambert PA-C.



IMPLANTS:  [employer] Attune total knee system, size-4 tibia, size-4 femur, 9 mm

polyethylene insert, 32-mm patellar _____ button.



ANESTHESIA TYPE:  General.



ESTIMATED BLOOD LOSS:  100 mL.



SPECIMEN:  None.



COMPLICATIONS:  None.



HISTORY:  The patient with prolonged history of right knee pain

progressively getting worse despite extensive conservative management,

which included activity modification, injections, anti-inflammatory

modification and physical therapy. X-rays had revealed advanced arthritis. 

Patient was indicated for total knee replacement due to continued pain and

limited mobility.  I had a detailed discussion with the patient in the

office explaining the nature of the surgery, alternatives of surgery, risks

and benefits, rehabilitation protocol and surgical markings.  Risks of

surgery include but not limited to continued pain, lack of motion,

infection, vascular injury, DVT / PE, nerve injury including peroneal nerve

dysfunction, reflex sympathetic dystrophy, compartment syndrome, unforeseen

medical and/or anesthesia complications, limb loss, and even death.  The

patient expressed an understanding of the risks and possible benefits of

the procedure, and is also aware of the alternatives to surgery.



PROCEDURE:  The patient was transported to the operating room and placed in

the supine position, general anesthesia was obtained.



Exam under anesthesia revealed _____.



A padded tourniquet was applied to patient's operative thigh and

appropriate prophylactic antibiotics were given.  The operative leg was

draped and prepped in standard sterile manner.  Timeout was completed,

confirming patient's right knee to be the correct operative site.  Using an

Esmarch, the extremity was exsanguinated and tourniquet was inflated to 350

mmHg.  The surgical incision markings were made using patella border,

tibial tubercle, patella and quadriceps tendon.  Using a 10 blade, a

midline incision was made.  Skin dissection was taken until the prepatellar

fascia was identified and the corners of the patellar tendon were marked

for proper closure at the end of the procedure.  Using a fresh 10 blade, a

medial parapatellar arthrotomy was performed.  The knee was exposed in the

standard manner.  The deep MCL was elevated for exposure, medial and

lateral menisci were removed, ACL and PCL were also transected.  The tibia

was subluxed anteriorly.



Planned tibial cut was made with power saw, using extra-medullary guide,

perpendicular to mechanical axis of the tibia.  After the cut was made, the

alignment was also checked and was found to be appropriate.  Tibial cut

surface was measured with trial base plate and it was noted that size-4

tibial baseplate was provided sufficient coverage without overhang.  Tibial

component was externally rotated and marked.



Next, the knee was placed into 90 degrees of flexion.  A drill hole was

made within the femoral notch anterior to PCL insertion for placement of

intramedullary femoral abilio.  Intramedullary femoral abilio was inserted within

the femoral canal and planned distal femoral cut was made.  After the cut,

knee was brought into full extension. Spacer blocks were used to check the

extension balancing both in full extension and 30 degrees of flexion. It

was found that 9 mm trial spacer block allowed full extension with

symmetric varus and valgus balancing.



Next we proceed with Patella resurfacing. Native patella width was found to

26 mm. Using the free-hand technique the arthritic patella surface was

resected.  Patella was sized using the guide and it was noted that size 32

mm patella dome button would be appropriate for the patient.



Next the size of femoral component  was determined using the posterior

referencing guide.  It was noted that a size-4 femur would be appropriate

for this patient without causing any significant notching.  A 4 x 1 cutting

block was placed and flexion gap balancing was checked.  The flexion gap

was found to be symmetric to the extension gap. Anterior and posterior

condyle, anterior and posterior chamfer cuts were made.  Next, appropriate

size box cut for femoral component was prepared using the guide.



The femoral trial component was impacted onto the distal femur. Appropriate

size tibial trial component was also placed on the cut surface of the

tibia. Using the drill and punch, keel for tibial implant was prepared.

Trial tibial tray was secured onto the tibia using pins.



Different size trial polyethylene inserts were secured on to the trial

tibial tray to critically assess the following parameters: Full range of

motion, extension and flexion gap balancing, mid-flexion stability,

anterior and posterior drawer, and patellar tracking.  All parameter were

found to be satisfactory with 9 mm insert.



All the trial components were removed. Implants were opened on the back

table. Cement was mixed and we proceed with cement fixation of the

implants. Tibial tray, femoral component and patellar dome button were

secured with cement. Polyethylene insert was secured onto the tibial tray

using locking mechanism. The knee was reduced and brought into full

extension.  Cement was allowed to harden until final component fixation.

Knee was taken through the final range of motion for stability testing, and

found to be satisfactory.



60 cc of custom cocktail mixture was injected into posterior capsule, MCL,

LCL, quadriceps tendon, and patellar tendon. Wound was copiously irrigated

with sterile antibiotic solution using pulse lavage. Arthrotomy was closed

using heavy suture and wound was closed in standard manner. Patient was

extubated, transferred to stretcher and taken to the recovery room.

Post-operative instructions were provided, physical therapy consult was

requested along with DVT prophylaxis and appropriate pain medications.



During this procedure, I was assisted by Dameon Lambert PA-C, who assisted

in positioning the patient on the operating room table as well as

transferring the patient from the operating room table to the recovery room

stretcher.  In addition, Dameon Lambert PA-C assisted me during the actual

operative procedure by positioning, protecting critical neurovascular

structures, exposure of the joint, and proper positioning of the implants. 

The presence of Dameon Lambert PA-C as my operative assistant was

medically necessary to ensure the utmost safety of the patient in the pre,

intra-, and post-operative periods.







__________________________________________

Michel Snow MD



DD:  01/11/2019 10:05:18

DT:  01/11/2019 11:16:33

Job # [id number]

## 2019-01-11 NOTE — RAD
Date of service: 



01/11/2019



PROCEDURE:  Right Knee Radiographs.



HISTORY:

s/p RTKR



COMPARISON:

08/24/2014.



FINDINGS:



BONES:

Satisfactory appearance and alignment of components of right TKA. 



JOINTS:

Normal. No osteoarthritis. 



JOINT EFFUSION:

None. 



OTHER FINDINGS:

Expected soft tissue changes identified.



IMPRESSION:

Satisfactory postoperative status.

## 2019-01-12 VITALS — RESPIRATION RATE: 20 BRPM

## 2019-01-12 LAB
BUN SERPL-MCNC: 9 MG/DL (ref 7–17)
CALCIUM SERPL-MCNC: 8.6 MG/DL (ref 8.4–10.2)
ERYTHROCYTE [DISTWIDTH] IN BLOOD BY AUTOMATED COUNT: 14.7 % (ref 11.5–14.5)
GFR NON-AFRICAN AMERICAN: > 60
HGB BLD-MCNC: 12.1 G/DL (ref 12–16)
MCH RBC QN AUTO: 30.9 PG (ref 27–31)
MCHC RBC AUTO-ENTMCNC: 33.3 G/DL (ref 33–37)
MCV RBC AUTO: 92.5 FL (ref 81–99)
PLATELET # BLD: 149 K/UL (ref 130–400)
RBC # BLD AUTO: 3.94 MIL/UL (ref 3.8–5.2)
WBC # BLD AUTO: 8.3 K/UL (ref 4.8–10.8)

## 2019-01-12 RX ADMIN — IPRATROPIUM BROMIDE AND ALBUTEROL SULFATE SCH ML: .5; 3 SOLUTION RESPIRATORY (INHALATION) at 08:05

## 2019-01-12 RX ADMIN — POTASSIUM CHLORIDE SCH MEQ: 20 TABLET, EXTENDED RELEASE ORAL at 09:00

## 2019-01-12 RX ADMIN — OXYCODONE HYDROCHLORIDE SCH: 10 TABLET, FILM COATED, EXTENDED RELEASE ORAL at 08:55

## 2019-01-12 RX ADMIN — ENOXAPARIN SODIUM SCH MG: 40 INJECTION SUBCUTANEOUS at 14:15

## 2019-01-12 RX ADMIN — VITAMIN D, TAB 1000IU (100/BT) SCH INTLU: 25 TAB at 09:02

## 2019-01-12 RX ADMIN — IPRATROPIUM BROMIDE AND ALBUTEROL SULFATE SCH ML: .5; 3 SOLUTION RESPIRATORY (INHALATION) at 19:06

## 2019-01-12 RX ADMIN — IPRATROPIUM BROMIDE AND ALBUTEROL SULFATE SCH ML: .5; 3 SOLUTION RESPIRATORY (INHALATION) at 15:27

## 2019-01-12 RX ADMIN — IPRATROPIUM BROMIDE AND ALBUTEROL SULFATE SCH ML: .5; 3 SOLUTION RESPIRATORY (INHALATION) at 11:50

## 2019-01-12 RX ADMIN — NIFEDIPINE SCH MG: 90 TABLET, FILM COATED, EXTENDED RELEASE ORAL at 09:00

## 2019-01-12 RX ADMIN — CEFAZOLIN SODIUM SCH MLS/HR: 2 SOLUTION INTRAVENOUS at 02:15

## 2019-01-12 RX ADMIN — PANTOPRAZOLE SODIUM SCH MG: 40 TABLET, DELAYED RELEASE ORAL at 09:02

## 2019-01-12 RX ADMIN — CEFAZOLIN SODIUM SCH MLS/HR: 2 SOLUTION INTRAVENOUS at 11:53

## 2019-01-12 RX ADMIN — OXYCODONE HYDROCHLORIDE SCH MG: 10 TABLET, FILM COATED, EXTENDED RELEASE ORAL at 21:37

## 2019-01-12 NOTE — CP.PCM.PN
Subjective





- Date & Time of Evaluation


Date of Evaluation: 01/12/19


Time of Evaluation: 13:15





- Subjective


Subjective: 





59 yo F s/p RTKR POD#1


Pt seen and examined sitting in chair, comfortable, in NAD


Pt denies any current pain, states it is well controlled


Pt denies SOB, chest pain, N/V/D, numbness/tingling RLE





Objective





- Vital Signs/Intake and Output


Vital Signs (last 24 hours): 


                                        











Temp Pulse Resp BP Pulse Ox


 


 97.3 F L  80   20   123/87   97 


 


 01/12/19 09:00  01/12/19 09:00  01/12/19 09:00  01/12/19 09:00  01/12/19 09:00











- Medications


Medications: 


                               Current Medications





Acetaminophen (Tylenol 325mg Tab)  325 mg PO Q4 PRN


   PRN Reason: pain1-3 or fever


Albuterol (Ventolin Hfa 90 Mcg/Actuation (8 G))  1 puff INH RQ6 PRN


   PRN Reason: Shortness of Breath


Albuterol/Ipratropium (Duoneb 3 Mg/0.5 Mg (3 Ml) Ud)  3 ml INH RQID [facility]


   Last Admin: 01/12/19 11:50 Dose:  3 ml


Alprazolam (Xanax)  1 mg PO BID PRN


   PRN Reason: Anxiety


Atorvastatin Calcium (Lipitor)  40 mg PO HS [facility]


   Last Admin: 01/11/19 22:17 Dose:  40 mg


Bisacodyl (Dulcolax)  10 mg PO HS PRN


   PRN Reason: Constipation


Celecoxib (Celebrex)  200 mg PO DAILY [facility]


   Last Admin: 01/12/19 08:59 Dose:  200 mg


Cholecalciferol (Vitamin D)  1,000 intlu PO DAILY [facility]


   Last Admin: 01/12/19 09:02 Dose:  1,000 intlu


Docusate Sodium (Colace)  100 mg PO BID [facility]


   Last Admin: 01/12/19 09:02 Dose:  Not Given


Enoxaparin Sodium (Lovenox)  40 mg SC DAILY [facility]; Protocol


Escitalopram Oxalate (Lexapro)  20 mg PO DAILY [facility]


   Last Admin: 01/12/19 09:02 Dose:  20 mg


Folic Acid (Folic Acid)  1 mg PO DAILY [facility]


   Last Admin: 01/12/19 09:11 Dose:  1 mg


Gabapentin (Neurontin)  400 mg PO TID [facility]


   Last Admin: 01/12/19 09:02 Dose:  400 mg


Home Med (Aripiprazole [Aripiprazole Odt])  15 mg PO DAILY [facility]


Lactated Ringer's (Lactated Ringer's)  1,000 mls @ 125 mls/hr IV .Q8H [facility]


   Last Admin: 01/12/19 02:45 Dose:  Not Given


Ketorolac Tromethamine (Toradol)  15 mg IVP Q6 [facility]


   Stop: 01/13/19 10:01


   Last Admin: 01/12/19 11:55 Dose:  15 mg


Nifedipine (Procardia Xl)  90 mg PO DAILY [facility]


   Last Admin: 01/12/19 09:00 Dose:  90 mg


Ondansetron HCl (Zofran Inj)  4 mg IVP Q6 PRN


   PRN Reason: Nausea/Vomiting


Oxycodone HCl (Oxycontin Extended Release Tab)  10 mg PO Q12 [facility]


   Stop: 01/14/19 21:01


   Last Admin: 01/12/19 08:55 Dose:  Not Given


Oxycodone HCl (Oxycodone Immediate Release Tab)  5 mg PO Q6 PRN


   PRN Reason: Pain, Mild (1-3)


Oxycodone/Acetaminophen (Percocet 5/325 Mg Tab)  2 tab PO Q4 PRN


   PRN Reason: Pain, moderate (4-7)


   Stop: 01/14/19 09:30


   Last Admin: 01/12/19 08:49 Dose:  2 tab


Pantoprazole Sodium (Protonix Ec Tab)  40 mg PO DAILY [facility]


   Last Admin: 01/12/19 09:02 Dose:  40 mg


Potassium Chloride (K-Dur 20 Meq Er Tab)  20 meq PO DAILY [facility]


   Last Admin: 01/12/19 09:00 Dose:  20 meq


Sitagliptin Phosphate (Januvia)  100 mg PO DAILY [facility]


   Last Admin: 01/12/19 09:00 Dose:  100 mg


Tramadol HCl (Ultram)  50 mg PO Q4 PRN


   PRN Reason: pain8-10


Trazodone HCl (Desyrel)  200 mg PO HS PRN


   PRN Reason: Insomnia











- Labs


Labs: 


                                        





                                 01/12/19 05:30 





                                 01/12/19 05:30 











- Constitutional


Appears: Well, No Acute Distress





- Respiratory Exam


Respiratory Exam: Clear to Ausculation Bilateral, NORMAL BREATHING PATTERN





- Cardiovascular Exam


Cardiovascular Exam: REGULAR RHYTHM, RRR





- Extremities Exam


Additional comments: 





RLE:


Knee dressing C/D/I


Calves soft and nontender b/l


Normal ROM rt ankle


No foot drop


N/V intact distally


Distal pulses wnl 





Assessment and Plan





- Assessment and Plan (Free Text)


Assessment: 





59 yo F s/p RTKR POD#1


Plan: 





Pain Control


PT/OT-WBAT RLE


DVT ppx- pt on home medication xarelto 20mg


Start lovenox 40mg daily today for ppx- will switch to xarelto pending cardiac 

recs


SCD b/l LE 


F/U daily labs


Discussed with Dr. Snow

## 2019-01-13 LAB
BUN SERPL-MCNC: 9 MG/DL (ref 7–17)
CALCIUM SERPL-MCNC: 8.8 MG/DL (ref 8.4–10.2)
ERYTHROCYTE [DISTWIDTH] IN BLOOD BY AUTOMATED COUNT: 15.3 % (ref 11.5–14.5)
GFR NON-AFRICAN AMERICAN: > 60
HGB BLD-MCNC: 11.7 G/DL (ref 12–16)
MCH RBC QN AUTO: 30.6 PG (ref 27–31)
MCHC RBC AUTO-ENTMCNC: 33.1 G/DL (ref 33–37)
MCV RBC AUTO: 92.5 FL (ref 81–99)
PLATELET # BLD: 142 K/UL (ref 130–400)
RBC # BLD AUTO: 3.82 MIL/UL (ref 3.8–5.2)
WBC # BLD AUTO: 7.6 K/UL (ref 4.8–10.8)

## 2019-01-13 RX ADMIN — NIFEDIPINE SCH MG: 90 TABLET, FILM COATED, EXTENDED RELEASE ORAL at 08:24

## 2019-01-13 RX ADMIN — IPRATROPIUM BROMIDE AND ALBUTEROL SULFATE SCH ML: .5; 3 SOLUTION RESPIRATORY (INHALATION) at 18:59

## 2019-01-13 RX ADMIN — OXYCODONE HYDROCHLORIDE SCH MG: 10 TABLET, FILM COATED, EXTENDED RELEASE ORAL at 08:35

## 2019-01-13 RX ADMIN — ENOXAPARIN SODIUM SCH MG: 40 INJECTION SUBCUTANEOUS at 08:25

## 2019-01-13 RX ADMIN — VITAMIN D, TAB 1000IU (100/BT) SCH INTLU: 25 TAB at 08:24

## 2019-01-13 RX ADMIN — IPRATROPIUM BROMIDE AND ALBUTEROL SULFATE SCH ML: .5; 3 SOLUTION RESPIRATORY (INHALATION) at 15:37

## 2019-01-13 RX ADMIN — IPRATROPIUM BROMIDE AND ALBUTEROL SULFATE SCH ML: .5; 3 SOLUTION RESPIRATORY (INHALATION) at 11:06

## 2019-01-13 RX ADMIN — IPRATROPIUM BROMIDE AND ALBUTEROL SULFATE SCH ML: .5; 3 SOLUTION RESPIRATORY (INHALATION) at 07:12

## 2019-01-13 RX ADMIN — OXYCODONE HYDROCHLORIDE SCH MG: 10 TABLET, FILM COATED, EXTENDED RELEASE ORAL at 21:12

## 2019-01-13 RX ADMIN — PANTOPRAZOLE SODIUM SCH MG: 40 TABLET, DELAYED RELEASE ORAL at 08:19

## 2019-01-13 RX ADMIN — POTASSIUM CHLORIDE SCH MEQ: 20 TABLET, EXTENDED RELEASE ORAL at 08:24

## 2019-01-14 ENCOUNTER — HOSPITAL ENCOUNTER (INPATIENT)
Dept: HOSPITAL 14 - H.TCU | Age: 59
LOS: 7 days | Discharge: HOME | DRG: 561 | End: 2019-01-21
Attending: FAMILY MEDICINE | Admitting: FAMILY MEDICINE
Payer: COMMERCIAL

## 2019-01-14 VITALS — BODY MASS INDEX: 35 KG/M2

## 2019-01-14 VITALS — SYSTOLIC BLOOD PRESSURE: 144 MMHG | DIASTOLIC BLOOD PRESSURE: 73 MMHG | HEART RATE: 80 BPM | TEMPERATURE: 98.1 F

## 2019-01-14 VITALS — OXYGEN SATURATION: 92 %

## 2019-01-14 DIAGNOSIS — Z86.711: ICD-10-CM

## 2019-01-14 DIAGNOSIS — E66.9: ICD-10-CM

## 2019-01-14 DIAGNOSIS — M17.12: ICD-10-CM

## 2019-01-14 DIAGNOSIS — J44.9: ICD-10-CM

## 2019-01-14 DIAGNOSIS — Z47.1: Primary | ICD-10-CM

## 2019-01-14 DIAGNOSIS — R26.2: ICD-10-CM

## 2019-01-14 DIAGNOSIS — Z96.651: ICD-10-CM

## 2019-01-14 DIAGNOSIS — F17.210: ICD-10-CM

## 2019-01-14 DIAGNOSIS — F41.9: ICD-10-CM

## 2019-01-14 DIAGNOSIS — I10: ICD-10-CM

## 2019-01-14 DIAGNOSIS — E11.9: ICD-10-CM

## 2019-01-14 DIAGNOSIS — F32.9: ICD-10-CM

## 2019-01-14 DIAGNOSIS — E78.5: ICD-10-CM

## 2019-01-14 DIAGNOSIS — E78.00: ICD-10-CM

## 2019-01-14 LAB
ALBUMIN SERPL-MCNC: 3.3 G/DL (ref 3.5–5)
ALBUMIN/GLOB SERPL: 1 {RATIO} (ref 1–2.1)
ALT SERPL-CCNC: 13 U/L (ref 9–52)
AST SERPL-CCNC: 22 U/L (ref 14–36)
BASOPHILS # BLD AUTO: 0.1 K/UL (ref 0–0.2)
BASOPHILS NFR BLD: 0.8 % (ref 0–2)
BUN SERPL-MCNC: 9 MG/DL (ref 7–17)
CALCIUM SERPL-MCNC: 9 MG/DL (ref 8.4–10.2)
EOSINOPHIL # BLD AUTO: 0.3 K/UL (ref 0–0.7)
EOSINOPHIL NFR BLD: 4 % (ref 0–4)
ERYTHROCYTE [DISTWIDTH] IN BLOOD BY AUTOMATED COUNT: 15.2 % (ref 11.5–14.5)
GFR NON-AFRICAN AMERICAN: > 60
HGB BLD-MCNC: 11.6 G/DL (ref 12–16)
LYMPHOCYTES # BLD AUTO: 1.5 K/UL (ref 1–4.3)
LYMPHOCYTES NFR BLD AUTO: 22.6 % (ref 20–40)
MCH RBC QN AUTO: 31 PG (ref 27–31)
MCHC RBC AUTO-ENTMCNC: 33.2 G/DL (ref 33–37)
MCV RBC AUTO: 93.2 FL (ref 81–99)
MONOCYTES # BLD: 0.5 K/UL (ref 0–0.8)
MONOCYTES NFR BLD: 7.5 % (ref 0–10)
NEUTROPHILS # BLD: 4.3 K/UL (ref 1.8–7)
NEUTROPHILS NFR BLD AUTO: 65.1 % (ref 50–75)
NRBC BLD AUTO-RTO: 0.1 % (ref 0–0)
PLATELET # BLD: 145 K/UL (ref 130–400)
PMV BLD AUTO: 9.3 FL (ref 7.2–11.7)
RBC # BLD AUTO: 3.76 MIL/UL (ref 3.8–5.2)
WBC # BLD AUTO: 6.7 K/UL (ref 4.8–10.8)

## 2019-01-14 PROCEDURE — F07L7ZZ MANUAL THERAPY TECHNIQUES TREATMENT OF MUSCULOSKELETAL SYSTEM - LOWER BACK / LOWER EXTREMITY: ICD-10-PCS | Performed by: FAMILY MEDICINE

## 2019-01-14 PROCEDURE — F07L6GZ THERAPEUTIC EXERCISE TREATMENT OF MUSCULOSKELETAL SYSTEM - LOWER BACK / LOWER EXTREMITY USING AEROBIC ENDURANCE AND CONDITIONING EQUIPMENT: ICD-10-PCS | Performed by: FAMILY MEDICINE

## 2019-01-14 PROCEDURE — F08Z1FZ DRESSING TECHNIQUES TREATMENT USING ASSISTIVE, ADAPTIVE, SUPPORTIVE OR PROTECTIVE EQUIPMENT: ICD-10-PCS | Performed by: FAMILY MEDICINE

## 2019-01-14 PROCEDURE — F08Z0FZ BATHING/SHOWERING TECHNIQUES TREATMENT USING ASSISTIVE, ADAPTIVE, SUPPORTIVE OR PROTECTIVE EQUIPMENT: ICD-10-PCS | Performed by: FAMILY MEDICINE

## 2019-01-14 PROCEDURE — F07Z9FZ GAIT TRAINING/FUNCTIONAL AMBULATION TREATMENT USING ASSISTIVE, ADAPTIVE, SUPPORTIVE OR PROTECTIVE EQUIPMENT: ICD-10-PCS | Performed by: FAMILY MEDICINE

## 2019-01-14 RX ADMIN — OXYCODONE HYDROCHLORIDE SCH MG: 10 TABLET, FILM COATED, EXTENDED RELEASE ORAL at 21:28

## 2019-01-14 RX ADMIN — NIFEDIPINE SCH MG: 90 TABLET, FILM COATED, EXTENDED RELEASE ORAL at 08:59

## 2019-01-14 RX ADMIN — OXYCODONE HYDROCHLORIDE SCH MG: 10 TABLET, FILM COATED, EXTENDED RELEASE ORAL at 08:57

## 2019-01-14 RX ADMIN — ENOXAPARIN SODIUM SCH MG: 40 INJECTION SUBCUTANEOUS at 09:00

## 2019-01-14 RX ADMIN — VITAMIN D, TAB 1000IU (100/BT) SCH INTLU: 25 TAB at 08:58

## 2019-01-14 RX ADMIN — PANTOPRAZOLE SODIUM SCH MG: 40 TABLET, DELAYED RELEASE ORAL at 08:58

## 2019-01-14 RX ADMIN — IPRATROPIUM BROMIDE AND ALBUTEROL SULFATE SCH ML: .5; 3 SOLUTION RESPIRATORY (INHALATION) at 20:25

## 2019-01-14 RX ADMIN — POTASSIUM CHLORIDE SCH MEQ: 20 TABLET, EXTENDED RELEASE ORAL at 08:59

## 2019-01-14 RX ADMIN — Medication PRN MG: at 16:59

## 2019-01-14 NOTE — RAD
Date of service: 



01/14/2019



HISTORY:

 routine 



COMPARISON:

11/20/2018. 



FINDINGS:



LUNGS:

The lungs are hyperinflated and there is peribronchial thickening 

with chronic changes in both lungs.  No focal consolidation.  There 

is discoid atelectasis/scarring in the right mid lung and left lower 

lobe. 



PLEURA:

No pleural effusions or pneumothorax. 



CARDIOVASCULAR:

The heart is normal in size.  There are aortic atherosclerotic 

calcifications present. 



OSSEOUS STRUCTURES:

Within normal limits for the patient's age.



VISUALIZED UPPER ABDOMEN:

Normal.



OTHER FINDINGS:

None.



IMPRESSION:

No active pulmonary disease.  COPD.

## 2019-01-14 NOTE — CP.PCM.PN
Subjective





- Date & Time of Evaluation


Date of Evaluation: 01/13/19


Time of Evaluation: 19:00





- Subjective


Subjective: 





Patient remains stable


 Has no pain on the right calf area.


Has no fever


 Doing well with Phy stherapy.





Objective





- Vital Signs/Intake and Output


Vital Signs (last 24 hours): 


                                        











Temp Pulse Resp BP Pulse Ox


 


 98.1 F   80   20   144/73   93 L


 


 01/14/19 09:04  01/14/19 09:04  01/14/19 09:04  01/14/19 09:04  01/14/19 09:04











- Medications


Medications: 


                               Current Medications





Acetaminophen (Tylenol 325mg Tab)  325 mg PO Q4 PRN


   PRN Reason: pain1-3 or fever


Albuterol (Ventolin Hfa 90 Mcg/Actuation (8 G))  1 puff INH RQ6 PRN


   PRN Reason: Shortness of Breath


Albuterol/Ipratropium (Duoneb 3 Mg/0.5 Mg (3 Ml) Ud)  3 ml INH RQID [facility]


   Last Admin: 01/13/19 18:59 Dose:  3 ml


Alprazolam (Xanax)  1 mg PO BID PRN


   PRN Reason: Anxiety


Aripiprazole (Abilify)  15 mg PO DAILY [facility]


   Last Admin: 01/14/19 08:59 Dose:  15 mg


Atorvastatin Calcium (Lipitor)  40 mg PO HS [facility]


   Last Admin: 01/13/19 21:11 Dose:  40 mg


Bisacodyl (Dulcolax)  10 mg PO HS PRN


   PRN Reason: Constipation


Celecoxib (Celebrex)  200 mg PO DAILY [facility]


   Last Admin: 01/14/19 09:00 Dose:  200 mg


Cholecalciferol (Vitamin D)  1,000 intlu PO DAILY [facility]


   Last Admin: 01/14/19 08:58 Dose:  1,000 intlu


Docusate Sodium (Colace)  100 mg PO BID [facility]


   Last Admin: 01/14/19 08:59 Dose:  100 mg


Enoxaparin Sodium (Lovenox)  40 mg SC DAILY [facility]; Protocol


   Last Admin: 01/14/19 09:00 Dose:  40 mg


Escitalopram Oxalate (Lexapro)  20 mg PO DAILY [facility]


   Last Admin: 01/14/19 09:00 Dose:  20 mg


Folic Acid (Folic Acid)  1 mg PO DAILY [facility]


   Last Admin: 01/14/19 08:59 Dose:  1 mg


Gabapentin (Neurontin)  400 mg PO TID [facility]


   Last Admin: 01/14/19 08:58 Dose:  400 mg


Nifedipine (Procardia Xl)  90 mg PO DAILY [facility]


   Last Admin: 01/14/19 08:59 Dose:  90 mg


Ondansetron HCl (Zofran Inj)  4 mg IVP Q6 PRN


   PRN Reason: Nausea/Vomiting


Oxycodone HCl (Oxycontin Extended Release Tab)  10 mg PO Q12 [facility]


   Stop: 01/14/19 21:01


   Last Admin: 01/14/19 08:57 Dose:  10 mg


Oxycodone HCl (Oxycodone Immediate Release Tab)  5 mg PO Q6 PRN


   PRN Reason: Pain, Mild (1-3)


   Last Admin: 01/14/19 03:44 Dose:  5 mg


Pantoprazole Sodium (Protonix Ec Tab)  40 mg PO DAILY [facility]


   Last Admin: 01/14/19 08:58 Dose:  40 mg


Potassium Chloride (K-Dur 20 Meq Er Tab)  20 meq PO DAILY [facility]


   Last Admin: 01/14/19 08:59 Dose:  20 meq


Sitagliptin Phosphate (Januvia)  100 mg PO DAILY [facility]


   Last Admin: 01/14/19 08:59 Dose:  100 mg


Trazodone HCl (Desyrel)  200 mg PO HS PRN


   PRN Reason: Insomnia


Zolpidem Tartrate (Ambien)  5 mg PO HS [facility]


   Last Admin: 01/13/19 22:14 Dose:  5 mg











- Labs


Labs: 


                                        





                                 01/14/19 05:25 





                                 01/14/19 05:25 











- Head Exam


Head Exam: NORMAL INSPECTION





- Eye Exam


Eye Exam: Normal appearance





- ENT Exam


ENT Exam: Mucous Membranes Moist





- Respiratory Exam


Respiratory Exam: Clear to Ausculation Bilateral





- Cardiovascular Exam


Cardiovascular Exam: REGULAR RHYTHM





- GI/Abdominal Exam


GI & Abdominal Exam: Normal Bowel Sounds





Assessment and Plan


(1) Status post total knee replacement


Status: Acute   





(2) Osteoarthritis


Status: Acute   





(3) Hyperlipidemia


Status: Acute   





(4) Diabetes mellitus type 2 in obese


Status: Acute   





(5) Depression


Status: Acute   





(6) Anxiety


Status: Acute   





(7) COPD (chronic obstructive pulmonary disease)


Status: Acute   





- Assessment and Plan (Free Text)


Plan: 





Cont meds


Cont tx 


Cont Phy stherapy


TCU eval.

## 2019-01-14 NOTE — CP.PCM.HP
History of Present Illness





- History of Present Illness


History of Present Illness: 





This is a 59 y/o female admitted for severe OA of the right knee and had TKR. 

This is her first day post op,


Immediate post op period is unremarkable.


She has a hx of asthma/ COPD  hypoperlipidemia OAS and depression/anxiety and DM

2


She denies chest pain or SOB.





Present on Admission





- Present on Admission


Any Indicators Present on Admission: No


History of DVT/PE: No


History of Uncontrolled Diabetes: Yes


Urinary Catheter: No


Decubitus Ulcer Present: No





Review of Systems





- Musculoskeletal


Musculoskeletal: Abnormal Gait, Arthralgias





Past Patient History





- Infectious Disease


Hx of Infectious Diseases: None





- Tetanus Immunizations


Tetanus Immunization: Unknown





- Past Medical History & Family History


Past Medical History?: Yes





- Past Social History


Smoking Status: Smoker Currrent Status Unknown





- CARDIAC


Hx Cardiac Disorders: Yes


Hx Angina: Yes


Hx Circulatory Problems: Yes (BLOOD CLOTS ON THE RIGHT ARM)


Hx Hypercholesterolemia: Yes


Hx Hypertension: Yes


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





- PULMONARY


Hx Respiratory Disorders: Yes


Hx Asthma: Yes


Hx Bronchitis: Yes


Hx Chronic Obstructive Pulmonary Disease (COPD): Yes


Hx Pneumonia: Yes


Hx Pulmonary Embolism: Yes


Other/Comment: WHEEZING





- NEUROLOGICAL


Hx Neurological Disorder: No





- HEENT


Hx HEENT Problems: Yes


Hx Glaucoma: Yes


Other/Comment: wears glasses





- RENAL


Hx Chronic Kidney Disease: No





- ENDOCRINE/METABOLIC


Hx Endocrine Disorders: Yes


Hx Diabetes Mellitus Type 2: Yes





- HEMATOLOGICAL/ONCOLOGICAL


Hx Blood Disorders: No


Hx AIDS: No


Hx Blood Transfusions: No


Hx Hepatitis C: No


Hx Human Immunodeficiency Virus (HIV): No


Other/Comment: DVT





- INTEGUMENTARY


Hx Dermatological Problems: No





- MUSCULOSKELETAL/RHEUMATOLOGICAL


Hx Musculoskeletal Disorders: Yes


Hx Arthritis: Yes


Hx Back Pain: Yes


Hx Falls: No


Hx Fractures: Yes (R ankle fx)


Hx Herniated Disk: Yes


Hx Rheumatoid Arthritis: Yes


Hx Unsteady Gait: Yes


Other/Comment: LIMIT JOINT MOTION





- GASTROINTESTINAL


Hx Gastrointestinal Disorders: No


Hx Ulcer: Yes


Other/Comment: HX GI BLEED





- GENITOURINARY/GYNECOLOGICAL


Hx Genitourinary Disorders: No


Hx Incontinence: Yes





- PSYCHIATRIC


Hx Emotional Abuse: No


Hx Physical Abuse: No





- SURGICAL HISTORY


Hx Surgeries: Yes


Hx Musculoskeletal Surgery: Yes (hx R ankle surger, L ft calluses sgy)


Hx Tonsillectomy: Yes


Hx Tubal Ligation: Yes


Other/Comment: LEFT FOOT SURGERY-3 YEARS.  LAPAROSCOPY





- ANESTHESIA


Hx Anesthesia: Yes


Hx Anesthesia Reactions: No


Hx Malignant Hyperthermia: No


Has any member of the family had a problem w/ anesthesia?: Yes (SON WAS CODED -

SURGERY FOR TONSILLECTOMY)





Meds


Allergies/Adverse Reactions: 


                                    Allergies











Allergy/AdvReac Type Severity Reaction Status Date / Time


 


No Known Allergies Allergy   Verified 01/11/19 06:29














Physical Exam





- Head Exam


Head Exam: NORMAL INSPECTION





- Eye Exam


Eye Exam: Normal appearance





- ENT Exam


ENT Exam: Mucous Membranes Moist





- Respiratory Exam


Respiratory Exam: Rhonchi





- Cardiovascular Exam


Cardiovascular Exam: REGULAR RHYTHM





- GI/Abdominal Exam


GI & Abdominal Exam: Normal Bowel Sounds





- Extremities Exam


Additional comments: 





right knee wound is clean  slight swelling.





Results





- Vital Signs


Recent Vital Signs: 





                                Last Vital Signs











Temp  98.1 F   01/14/19 09:04


 


Pulse  80   01/14/19 09:04


 


Resp  20   01/14/19 09:04


 


BP  144/73   01/14/19 09:04


 


Pulse Ox  93 L  01/14/19 09:04














- Labs


Result Diagrams: 


                                 01/14/19 05:25





                                 01/14/19 05:25


Labs: 





                         Laboratory Results - last 24 hr











  01/13/19 01/13/19 01/13/19





  11:16 16:19 21:02


 


WBC   


 


RBC   


 


Hgb   


 


Hct   


 


MCV   


 


MCH   


 


MCHC   


 


RDW   


 


Plt Count   


 


MPV   


 


Neut % (Auto)   


 


Lymph % (Auto)   


 


Mono % (Auto)   


 


Eos % (Auto)   


 


Baso % (Auto)   


 


Neut # (Auto)   


 


Lymph # (Auto)   


 


Mono # (Auto)   


 


Eos # (Auto)   


 


Baso # (Auto)   


 


Sodium   


 


Potassium   


 


Chloride   


 


Carbon Dioxide   


 


Anion Gap   


 


BUN   


 


Creatinine   


 


Est GFR ( Amer)   


 


Est GFR (Non-Af Amer)   


 


POC Glucose (mg/dL)  111 H  122 H  154 H


 


Random Glucose   


 


Calcium   


 


Total Bilirubin   


 


AST   


 


ALT   


 


Alkaline Phosphatase   


 


Total Protein   


 


Albumin   


 


Globulin   


 


Albumin/Globulin Ratio   














  01/14/19 01/14/19 01/14/19





  05:25 05:25 05:26


 


WBC  6.7  


 


RBC  3.76 L  


 


Hgb  11.6 L  


 


Hct  35.1  


 


MCV  93.2  


 


MCH  31.0  


 


MCHC  33.2  


 


RDW  15.2 H  


 


Plt Count  145  


 


MPV  9.3  


 


Neut % (Auto)  65.1  


 


Lymph % (Auto)  22.6  


 


Mono % (Auto)  7.5  


 


Eos % (Auto)  4.0  


 


Baso % (Auto)  0.8  


 


Neut # (Auto)  4.3  


 


Lymph # (Auto)  1.5  


 


Mono # (Auto)  0.5  


 


Eos # (Auto)  0.3  


 


Baso # (Auto)  0.1  


 


Sodium   134 


 


Potassium   4.3 


 


Chloride   105 


 


Carbon Dioxide   23 


 


Anion Gap   10 


 


BUN   9 


 


Creatinine   0.7 


 


Est GFR ( Amer)   > 60 


 


Est GFR (Non-Af Amer)   > 60 


 


POC Glucose (mg/dL)    118 H


 


Random Glucose   104 


 


Calcium   9.0 


 


Total Bilirubin   0.5 


 


AST   22 


 


ALT   13 


 


Alkaline Phosphatase   80 


 


Total Protein   6.7 


 


Albumin   3.3 L D 


 


Globulin   3.3 


 


Albumin/Globulin Ratio   1.0 














Assessment & Plan


(1) Status post total knee replacement


Status: Acute   





(2) Osteoarthritis


Status: Acute   





(3) Hyperlipidemia


Status: Acute   





(4) Diabetes mellitus type 2 in obese


Status: Acute   





(5) Depression


Status: Acute   





(6) Anxiety


Status: Acute   





(7) COPD (chronic obstructive pulmonary disease)


Status: Acute   





- Assessment and Plan (Free Text)


Plan: 





Cont mewds


Pain meds


 Neb tx


  Phys therapy


 cough meds


 CXR

## 2019-01-14 NOTE — CP.PCM.PN
Subjective





- Date & Time of Evaluation


Date of Evaluation: 01/14/19


Time of Evaluation: 08:00





- Subjective


Subjective: 





Patient seen and examined at bedside comfortable. Pain is well controlled. No 

acute events over the weekend. Denies CP/SOB/N/V/fever.





Objective





- Vital Signs/Intake and Output


Vital Signs (last 24 hours): 


                                        











Temp Pulse Resp BP Pulse Ox


 


 98.1 F   80   20   144/73   93 L


 


 01/14/19 09:04  01/14/19 09:04  01/14/19 09:04  01/14/19 09:04  01/14/19 09:04











- Medications


Medications: 


                               Current Medications





Acetaminophen (Tylenol 325mg Tab)  325 mg PO Q4 PRN


   PRN Reason: pain1-3 or fever


Albuterol (Ventolin Hfa 90 Mcg/Actuation (8 G))  1 puff INH RQ6 PRN


   PRN Reason: Shortness of Breath


Albuterol/Ipratropium (Duoneb 3 Mg/0.5 Mg (3 Ml) Ud)  3 ml INH RQID [facility]


   Last Admin: 01/13/19 18:59 Dose:  3 ml


Alprazolam (Xanax)  1 mg PO BID PRN


   PRN Reason: Anxiety


Aripiprazole (Abilify)  15 mg PO DAILY [facility]


   Last Admin: 01/14/19 08:59 Dose:  15 mg


Atorvastatin Calcium (Lipitor)  40 mg PO HS [facility]


   Last Admin: 01/13/19 21:11 Dose:  40 mg


Bisacodyl (Dulcolax)  10 mg PO HS PRN


   PRN Reason: Constipation


Celecoxib (Celebrex)  200 mg PO DAILY [facility]


   Last Admin: 01/14/19 09:00 Dose:  200 mg


Cholecalciferol (Vitamin D)  1,000 intlu PO DAILY [facility]


   Last Admin: 01/14/19 08:58 Dose:  1,000 intlu


Docusate Sodium (Colace)  100 mg PO BID [facility]


   Last Admin: 01/14/19 08:59 Dose:  100 mg


Enoxaparin Sodium (Lovenox)  40 mg SC DAILY [facility]; Protocol


   Last Admin: 01/14/19 09:00 Dose:  40 mg


Escitalopram Oxalate (Lexapro)  20 mg PO DAILY [facility]


   Last Admin: 01/14/19 09:00 Dose:  20 mg


Folic Acid (Folic Acid)  1 mg PO DAILY [facility]


   Last Admin: 01/14/19 08:59 Dose:  1 mg


Gabapentin (Neurontin)  400 mg PO TID [facility]


   Last Admin: 01/14/19 08:58 Dose:  400 mg


Nifedipine (Procardia Xl)  90 mg PO DAILY [facility]


   Last Admin: 01/14/19 08:59 Dose:  90 mg


Ondansetron HCl (Zofran Inj)  4 mg IVP Q6 PRN


   PRN Reason: Nausea/Vomiting


Oxycodone HCl (Oxycontin Extended Release Tab)  10 mg PO Q12 [facility]


   Stop: 01/14/19 21:01


   Last Admin: 01/14/19 08:57 Dose:  10 mg


Oxycodone HCl (Oxycodone Immediate Release Tab)  5 mg PO Q6 PRN


   PRN Reason: Pain, Mild (1-3)


   Last Admin: 01/14/19 03:44 Dose:  5 mg


Oxycodone/Acetaminophen (Percocet 5/325 Mg Tab)  2 tab PO Q4 PRN


   PRN Reason: Pain, moderate (4-7)


   Stop: 01/14/19 09:30


   Last Admin: 01/12/19 08:49 Dose:  2 tab


Pantoprazole Sodium (Protonix Ec Tab)  40 mg PO DAILY [facility]


   Last Admin: 01/14/19 08:58 Dose:  40 mg


Potassium Chloride (K-Dur 20 Meq Er Tab)  20 meq PO DAILY [facility]


   Last Admin: 01/14/19 08:59 Dose:  20 meq


Sitagliptin Phosphate (Januvia)  100 mg PO DAILY [facility]


   Last Admin: 01/14/19 08:59 Dose:  100 mg


Trazodone HCl (Desyrel)  200 mg PO HS PRN


   PRN Reason: Insomnia


Zolpidem Tartrate (Ambien)  5 mg PO HS [facility]


   Last Admin: 01/13/19 22:14 Dose:  5 mg











- Labs


Labs: 


                                        





                                 01/14/19 05:25 





                                 01/14/19 05:25 











- Extremities Exam


Additional comments: 


R knee: Dressings CDI


Incision CDI with staples


mild swelling and tenderness 2nd to surgery


sensation intact SP/DP/TN


motor intact EHL/FHL/TA/G


pedal pulses intact


calves soft NT b/l








Assessment and Plan


(1) Status post total knee replacement


Assessment & Plan: 


POD#3 s/p R TKA


-PT/OT WBAT


-Dressings changed


-DVT ppx


-ice elevate


-CPM as per order


-orthopedically stable for discharge


-above d/w Dr. Snow in agreement


Status: Acute

## 2019-01-14 NOTE — PQF
PROVIDER RESPONSE TEXT:



History of COPD. No know history of asthma



REVIEWER QUERY TEXT:



Asthma Specificity and Type



Asthma is documented in the Medical Record.  Please specify the type and severity of asthma

Such as:

-- Mild intermittent

-- Mild persistent

-- Moderate persistent

-- Severe persistent

-- Exercise induced bronchospasm

-- Cough variant asthma

-- Other, please specify



The patient's Clinical Indicators include:





Medication: Duonebs, Ventolin HFA





Query created by: Jennifer Burch on 1/14/2019 10:23 AM





Electronically signed by:  Torey Salvador MD 1/14/2019 2:38 PM

## 2019-01-15 RX ADMIN — NIFEDIPINE SCH MG: 90 TABLET, FILM COATED, EXTENDED RELEASE ORAL at 08:15

## 2019-01-15 RX ADMIN — Medication PRN MG: at 12:36

## 2019-01-15 RX ADMIN — POTASSIUM CHLORIDE SCH MEQ: 20 TABLET, EXTENDED RELEASE ORAL at 08:15

## 2019-01-15 RX ADMIN — IPRATROPIUM BROMIDE AND ALBUTEROL SULFATE SCH ML: .5; 3 SOLUTION RESPIRATORY (INHALATION) at 11:25

## 2019-01-15 RX ADMIN — PANTOPRAZOLE SODIUM SCH MG: 40 TABLET, DELAYED RELEASE ORAL at 08:14

## 2019-01-15 RX ADMIN — IPRATROPIUM BROMIDE AND ALBUTEROL SULFATE SCH ML: .5; 3 SOLUTION RESPIRATORY (INHALATION) at 07:33

## 2019-01-15 RX ADMIN — OXYCODONE HYDROCHLORIDE SCH MG: 10 TABLET, FILM COATED, EXTENDED RELEASE ORAL at 08:13

## 2019-01-15 RX ADMIN — ENOXAPARIN SODIUM SCH MG: 40 INJECTION SUBCUTANEOUS at 08:14

## 2019-01-15 RX ADMIN — IPRATROPIUM BROMIDE AND ALBUTEROL SULFATE SCH ML: .5; 3 SOLUTION RESPIRATORY (INHALATION) at 16:57

## 2019-01-15 RX ADMIN — OXYCODONE HYDROCHLORIDE SCH MG: 10 TABLET, FILM COATED, EXTENDED RELEASE ORAL at 21:18

## 2019-01-15 RX ADMIN — IPRATROPIUM BROMIDE AND ALBUTEROL SULFATE SCH ML: .5; 3 SOLUTION RESPIRATORY (INHALATION) at 20:00

## 2019-01-15 RX ADMIN — Medication PRN MG: at 04:58

## 2019-01-15 RX ADMIN — Medication PRN MG: at 17:37

## 2019-01-15 RX ADMIN — VITAMIN D, TAB 1000IU (100/BT) SCH INTLU: 25 TAB at 08:15

## 2019-01-16 VITALS — RESPIRATION RATE: 20 BRPM

## 2019-01-16 RX ADMIN — VITAMIN D, TAB 1000IU (100/BT) SCH INTLU: 25 TAB at 08:36

## 2019-01-16 RX ADMIN — Medication PRN MG: at 23:51

## 2019-01-16 RX ADMIN — IPRATROPIUM BROMIDE AND ALBUTEROL SULFATE SCH: .5; 3 SOLUTION RESPIRATORY (INHALATION) at 15:37

## 2019-01-16 RX ADMIN — OXYCODONE HYDROCHLORIDE SCH MG: 10 TABLET, FILM COATED, EXTENDED RELEASE ORAL at 21:05

## 2019-01-16 RX ADMIN — IPRATROPIUM BROMIDE AND ALBUTEROL SULFATE SCH ML: .5; 3 SOLUTION RESPIRATORY (INHALATION) at 07:33

## 2019-01-16 RX ADMIN — PANTOPRAZOLE SODIUM SCH MG: 40 TABLET, DELAYED RELEASE ORAL at 08:38

## 2019-01-16 RX ADMIN — POTASSIUM CHLORIDE SCH MEQ: 20 TABLET, EXTENDED RELEASE ORAL at 08:37

## 2019-01-16 RX ADMIN — Medication PRN MG: at 14:55

## 2019-01-16 RX ADMIN — ENOXAPARIN SODIUM SCH MG: 40 INJECTION SUBCUTANEOUS at 08:36

## 2019-01-16 RX ADMIN — OXYCODONE HYDROCHLORIDE SCH MG: 10 TABLET, FILM COATED, EXTENDED RELEASE ORAL at 08:45

## 2019-01-16 RX ADMIN — NIFEDIPINE SCH MG: 90 TABLET, FILM COATED, EXTENDED RELEASE ORAL at 08:38

## 2019-01-16 RX ADMIN — Medication PRN MG: at 05:34

## 2019-01-16 RX ADMIN — IPRATROPIUM BROMIDE AND ALBUTEROL SULFATE SCH ML: .5; 3 SOLUTION RESPIRATORY (INHALATION) at 19:20

## 2019-01-16 RX ADMIN — IPRATROPIUM BROMIDE AND ALBUTEROL SULFATE SCH: .5; 3 SOLUTION RESPIRATORY (INHALATION) at 11:20

## 2019-01-16 NOTE — CP.PCM.CON
History of Present Illness





- History of Present Illness


History of Present Illness: 





Dr Gomez PMR consultation on Michelle Weaver, born 1960 who has been 

admitted to [facility] following a right TKR by Dr Snow.  Post op doing 

remarkably well.  Pain is well controlled and she has been very active in 

therapies.  Failed conservative care.  Left knee also has advanced djd





Review of Systems





- Constitutional


Constitutional: absent: Anorexia, Chills





- EENT


Eyes: absent: Change in Vision


Ears: absent: Ear Discharge, Ear Pain


Nose/Mouth/Throat: absent: Nasal Congestion





- Cardiovascular


Cardiovascular: absent: Chest Pain





- Respiratory


Respiratory: Cough (chronic smoked 3ppd for many years)





- Gastrointestinal


Gastrointestinal: absent: Constipation





- Genitourinary


Genitourinary: absent: Urinary Incontinence





- Musculoskeletal


Musculoskeletal: Back Pain, Muscle Cramps.  absent: Neck Pain





- Integumentary


Integumentary: absent: Bleeding Lesions





- Neurological


Neurological: absent: Abnormal Hearing, Abnormal Movements, Behavioral Changes, 

Dizziness, Numbness





- Psychiatric


Psychiatric: absent: Anxiety





Past Patient History





- Infectious Disease


Hx of Infectious Diseases: None





- Tetanus Immunizations


Tetanus Immunization: Unknown





- Past Medical History & Family History


Past Medical History?: Yes





- Past Social History


Smoking Status: Heavy Smoker > 10 Cigarettes Daily


Alcohol: None


Drugs: Denies


Home Situation {Lives}: Alone (elevator)





- CARDIAC


Hx Cardiac Disorders: Yes


Hx Angina: Yes


Hx Circulatory Problems: Yes (BLOOD CLOTS in right upper extremity)


Hx Hypercholesterolemia: Yes


Hx Hypertension: Yes


Other/Comment: hx of IVC filter 2013 right arm after DVT





- PULMONARY


Hx Respiratory Disorders: Yes


Hx Asthma: Yes


Hx Bronchitis: Yes


Hx Chronic Obstructive Pulmonary Disease (COPD): Yes


Hx Pneumonia: Yes


Hx Pulmonary Embolism: Yes


Other/Comment: WHEEZING





- NEUROLOGICAL


Hx Neurological Disorder: No





- HEENT


Hx HEENT Problems: Yes


Hx Glaucoma: Yes


Other/Comment: wears glasses





- RENAL


Hx Chronic Kidney Disease: No





- ENDOCRINE/METABOLIC


Hx Endocrine Disorders: Yes


Hx Diabetes Mellitus Type 2: Yes





- HEMATOLOGICAL/ONCOLOGICAL


Hx Blood Disorders: No


Hx Blood Transfusions: No


Other/Comment: DVT





- INTEGUMENTARY


Hx Dermatological Problems: No





- MUSCULOSKELETAL/RHEUMATOLOGICAL


Hx Falls: No


Hx Osteoarthritis: Yes





- GASTROINTESTINAL


Hx Gastrointestinal Disorders: No


Hx Ulcer: Yes


Other/Comment: HX GI BLEED





- GENITOURINARY/GYNECOLOGICAL


Hx Genitourinary Disorders: No


Hx Incontinence: Yes





- PSYCHIATRIC


Hx Substance Use: No





- SURGICAL HISTORY


Hx Surgeries: Yes


Hx Musculoskeletal Surgery: Yes (hx R ankle surger, L ft calluses sgy)


Hx Tonsillectomy: Yes


Hx Tubal Ligation: Yes


Other/Comment: LEFT FOOT SURGERY-3 YEARS.  LAPAROSCOPY





- ANESTHESIA


Hx Anesthesia: Yes


Hx Anesthesia Reactions: No


Hx Malignant Hyperthermia: No





Meds


Allergies/Adverse Reactions: 


                                    Allergies











Allergy/AdvReac Type Severity Reaction Status Date / Time


 


No Known Allergies Allergy   Verified 01/11/19 06:29














- Medications


Medications: 


                               Current Medications





Acetaminophen (Tylenol 325mg Tab)  650 mg PO Q4 PRN


   PRN Reason: Pain, Mild (1-3)


Acetaminophen (Tylenol 325mg Tab)  650 mg PO Q4 PRN


   PRN Reason: Fever >100.4 F


Albuterol (Ventolin Hfa 90 Mcg/Actuation (8 G))  1 puff INH RQ6 PRN


   PRN Reason: Shortness of Breath


Albuterol/Ipratropium (Duoneb 3 Mg/0.5 Mg (3 Ml) Ud)  3 ml INH RQID [facility]


   Last Admin: 01/16/19 15:37 Dose:  Not Given


Alprazolam (Xanax)  1 mg PO BID PRN


   PRN Reason: Anxiety


   Last Admin: 01/15/19 21:18 Dose:  1 mg


Aripiprazole (Abilify)  15 mg PO DAILY [facility]


   Last Admin: 01/16/19 08:37 Dose:  15 mg


Atorvastatin Calcium (Lipitor)  40 mg PO HS [facility]


   Last Admin: 01/15/19 21:16 Dose:  40 mg


Bisacodyl (Dulcolax)  10 mg PO DAILY PRN


   PRN Reason: Constipation


Celecoxib (Celebrex)  200 mg PO DAILY [facility]


   Last Admin: 01/16/19 08:38 Dose:  200 mg


Cholecalciferol (Vitamin D)  2,000 intlu PO DAILY [facility]


   Last Admin: 01/16/19 08:36 Dose:  2,000 intlu


Docusate Sodium (Colace)  100 mg PO BID [facility]


   Last Admin: 01/16/19 16:30 Dose:  Not Given


Enoxaparin Sodium (Lovenox)  40 mg SC DAILY [facility]; Protocol


   Last Admin: 01/16/19 08:36 Dose:  40 mg


Escitalopram Oxalate (Lexapro)  20 mg PO DAILY [facility]


   Last Admin: 01/16/19 08:38 Dose:  20 mg


Folic Acid (Folic Acid)  1 mg PO DAILY [facility]


   Last Admin: 01/16/19 08:39 Dose:  1 mg


Gabapentin (Neurontin)  400 mg PO TID [facility]


   Last Admin: 01/16/19 16:31 Dose:  400 mg


Nifedipine (Procardia Xl)  90 mg PO DAILY [facility]


   Last Admin: 01/16/19 08:38 Dose:  90 mg


Ondansetron HCl (Zofran Odt)  4 mg PO Q6 PRN


   PRN Reason: Nausea/Vomiting


Oxycodone HCl (Oxycontin Extended Release Tab)  10 mg PO Q12 [facility]


   Stop: 01/17/19 21:01


   Last Admin: 01/16/19 08:45 Dose:  10 mg


Oxycodone HCl (Oxycodone Immediate Release Tab)  5 mg PO Q4 PRN


   PRN Reason: Pain, moderate (4-7)


   Last Admin: 01/16/19 14:55 Dose:  5 mg


Pantoprazole Sodium (Protonix Ec Tab)  40 mg PO DAILY [facility]


   Last Admin: 01/16/19 08:38 Dose:  40 mg


Potassium Chloride (K-Dur 20 Meq Er Tab)  20 meq PO DAILY [facility]


   Last Admin: 01/16/19 08:37 Dose:  20 meq


Sitagliptin Phosphate (Januvia)  100 mg PO DAILY [facility]


   Last Admin: 01/16/19 08:38 Dose:  100 mg


Tiotropium Bromide (Spiriva)  18 mcg INH DAILY [facility]


Trazodone HCl (Desyrel)  200 mg PO HS PRN


   PRN Reason: Insomnia


Zolpidem Tartrate (Ambien)  5 mg PO HS PRN


   PRN Reason: Insomnia


   Last Admin: 01/16/19 02:03 Dose:  5 mg











Physical Exam





- Constitutional


Appears: Non-toxic, No Acute Distress





- Head Exam


Head Exam: ATRAUMATIC, NORMAL INSPECTION, NORMOCEPHALIC





- Eye Exam


Eye Exam: EOMI


Pupil Exam: NORMAL ACCOMODATION





- ENT Exam


ENT Exam: Mucous Membranes Moist





- Respiratory Exam


Respiratory Exam: NORMAL BREATHING PATTERN





- Cardiovascular Exam


Cardiovascular Exam: REGULAR RHYTHM





- GI/Abdominal Exam


GI & Abdominal Exam: Normal Bowel Sounds.  absent: Firm





- Neurological Exam


Neurological exam: Alert, Oriented x3





- Psychiatric Exam


Psychiatric exam: Normal Affect, Normal Mood





- Skin


Skin Exam: Warm





Results





- Vital Signs


Recent Vital Signs: 


                                Last Vital Signs











Temp  98.7 F   01/16/19 16:24


 


Pulse  70   01/16/19 16:24


 


Resp  20   01/16/19 16:24


 


BP  113/73   01/16/19 16:24


 


Pulse Ox  94 L  01/16/19 16:24














- Labs


Labs: 


                         Laboratory Results - last 24 hr











  01/15/19 01/16/19 01/16/19





  21:06 05:30 11:57


 


POC Glucose (mg/dL)  107  101  100














  01/16/19





  16:11


 


POC Glucose (mg/dL)  115 H














Assessment & Plan





- Assessment and Plan (Free Text)


Assessment: 





58 year old female s/p right TKR doing well


PT/OT to continue to help increase functional independence


Team conference for d/c planning


Pain: controlled


Vascular: no evidence of DVT


GI: No evidence of constipation or diarrhea





Patient continues to be an excellent acute rehabilitation candidate and will 

have continued focused pain management,  PT, OT and recreational therapy to help

facilitate a safe and appropriate d/c plan

## 2019-01-16 NOTE — CP.PCM.DIS
Provider





- Provider


Date of Admission: 


01/11/19 10:23





Attending physician: 


Torey Salvador MD





Consults: 








01/11/19 09:29


Case Management Referral Routine 


   Comment: 


   Physician Instructions: 


   Reason For Exam: 


   Reason for Referral: Discharge Planning





01/11/19 10:33


Orthopedic Consult Routine 


   Comment: 


   Consulting Provider: Michel Snow


   Consulting Physician: Michel Snow


   Reason for Consult: tkr














Diagnosis





- Discharge Diagnosis


(1) Status post total knee replacement


Status: Acute   





(2) Osteoarthritis


Status: Acute   





(3) Hyperlipidemia


Status: Acute   





(4) Diabetes mellitus type 2 in obese


Status: Acute   





(5) Depression


Status: Acute   





(6) Anxiety


Status: Acute   





(7) COPD (chronic obstructive pulmonary disease)


Status: Acute   





Hospital Course





- Lab Results


Lab Results: 


                             Most Recent Lab Values











WBC  6.7 K/uL (4.8-10.8)   01/14/19  05:25    


 


RBC  3.76 Mil/uL (3.80-5.20)  L  01/14/19  05:25    


 


Hgb  11.6 g/dL (12.0-16.0)  L  01/14/19  05:25    


 


Hct  35.1 % (34.0-47.0)   01/14/19  05:25    


 


MCV  93.2 fl (81.0-99.0)   01/14/19  05:25    


 


MCH  31.0 pg (27.0-31.0)   01/14/19  05:25    


 


MCHC  33.2 g/dL (33.0-37.0)   01/14/19  05:25    


 


RDW  15.2 % (11.5-14.5)  H  01/14/19  05:25    


 


Plt Count  145 K/uL (130-400)   01/14/19  05:25    


 


MPV  9.3 fl (7.2-11.7)   01/14/19  05:25    


 


Neut % (Auto)  65.1 % (50.0-75.0)   01/14/19  05:25    


 


Lymph % (Auto)  22.6 % (20.0-40.0)   01/14/19  05:25    


 


Mono % (Auto)  7.5 % (0.0-10.0)   01/14/19  05:25    


 


Eos % (Auto)  4.0 % (0.0-4.0)   01/14/19  05:25    


 


Baso % (Auto)  0.8 % (0.0-2.0)   01/14/19  05:25    


 


Neut # (Auto)  4.3 K/uL (1.8-7.0)   01/14/19  05:25    


 


Lymph # (Auto)  1.5 K/uL (1.0-4.3)   01/14/19  05:25    


 


Mono # (Auto)  0.5 K/uL (0.0-0.8)   01/14/19  05:25    


 


Eos # (Auto)  0.3 K/uL (0.0-0.7)   01/14/19  05:25    


 


Baso # (Auto)  0.1 K/uL (0.0-0.2)   01/14/19  05:25    


 


Sodium  134 mmol/l (132-148)   01/14/19  05:25    


 


Potassium  4.3 MMOL/L (3.6-5.0)   01/14/19  05:25    


 


Chloride  105 mmol/L ()   01/14/19  05:25    


 


Carbon Dioxide  23 mmol/L (22-30)   01/14/19  05:25    


 


Anion Gap  10  (10-20)   01/14/19  05:25    


 


BUN  9 mg/dl (7-17)   01/14/19  05:25    


 


Creatinine  0.7 mg/dl (0.7-1.2)   01/14/19  05:25    


 


Est GFR ( Amer)  > 60   01/14/19  05:25    


 


Est GFR (Non-Af Amer)  > 60   01/14/19  05:25    


 


POC Glucose (mg/dL)  145 mg/dL ()  H  01/14/19  10:44    


 


Random Glucose  104 mg/dL ()   01/14/19  05:25    


 


Calcium  9.0 mg/dL (8.4-10.2)   01/14/19  05:25    


 


Total Bilirubin  0.5 mg/dl (0.2-1.3)   01/14/19  05:25    


 


AST  22 U/L (14-36)   01/14/19  05:25    


 


ALT  13 U/L (9-52)   01/14/19  05:25    


 


Alkaline Phosphatase  80 U/L ()   01/14/19  05:25    


 


Total Protein  6.7 G/DL (6.3-8.2)   01/14/19  05:25    


 


Albumin  3.3 g/dL (3.5-5.0)  L D 01/14/19  05:25    


 


Globulin  3.3 gm/dL (2.2-3.9)   01/14/19  05:25    


 


Albumin/Globulin Ratio  1.0  (1.0-2.1)   01/14/19  05:25    


 


25-OH Vitamin D Total  22.7 NG/ML (30.0-100.0)  L  01/12/19  05:30    


 


Blood Type  A POSITIVE   01/11/19  07:20    


 


Antibody Screen  Negative   01/11/19  07:20    


 


BBK History Checked  Patient has bt   01/11/19  07:20    














- Hospital Course


Hospital Course: 





This is a 59 y/o female admitted for right TKR for severe DJD.


Post op period was unremarkable


She was started on phys therapy





Discharge Exam





- Head Exam


Head Exam: NORMAL INSPECTION





- Eye Exam


Eye Exam: Normal appearance





- Respiratory Exam


Respiratory Exam: Clear to PA & Lateral





- Cardiovascular Exam


Cardiovascular Exam: REGULAR RHYTHM





- GI/Abdominal Exam


GI & Abdominal Exam: Normal Bowel Sounds





- Neurological Exam


Neurological exam: CN II-XII Intact





- Psychiatric Exam


Psychiatric exam: Normal Mood





Discharge Plan





- Follow Up Plan


Condition: GOOD


Disposition: TRANSF TO SNF


Instructions:  Total Knee Replacement  (DC), Weight-Bearing Restrictions


Referrals: 


Tian Simmons MD [Family Provider] - 


Michel Snow MD [Staff Provider] -

## 2019-01-17 RX ADMIN — PANTOPRAZOLE SODIUM SCH MG: 40 TABLET, DELAYED RELEASE ORAL at 08:25

## 2019-01-17 RX ADMIN — NIFEDIPINE SCH MG: 90 TABLET, FILM COATED, EXTENDED RELEASE ORAL at 08:25

## 2019-01-17 RX ADMIN — POTASSIUM CHLORIDE SCH MEQ: 20 TABLET, EXTENDED RELEASE ORAL at 08:25

## 2019-01-17 RX ADMIN — VITAMIN D, TAB 1000IU (100/BT) SCH INTLU: 25 TAB at 08:25

## 2019-01-17 RX ADMIN — Medication PRN MG: at 13:18

## 2019-01-17 RX ADMIN — Medication PRN MG: at 18:00

## 2019-01-17 RX ADMIN — TIOTROPIUM BROMIDE SCH MCG: 18 CAPSULE ORAL; RESPIRATORY (INHALATION) at 08:26

## 2019-01-17 RX ADMIN — OXYCODONE HYDROCHLORIDE SCH MG: 10 TABLET, FILM COATED, EXTENDED RELEASE ORAL at 21:42

## 2019-01-17 RX ADMIN — IPRATROPIUM BROMIDE AND ALBUTEROL SULFATE SCH ML: .5; 3 SOLUTION RESPIRATORY (INHALATION) at 07:12

## 2019-01-17 RX ADMIN — ENOXAPARIN SODIUM SCH MG: 40 INJECTION SUBCUTANEOUS at 08:23

## 2019-01-17 RX ADMIN — Medication PRN MG: at 04:17

## 2019-01-17 RX ADMIN — IPRATROPIUM BROMIDE AND ALBUTEROL SULFATE SCH ML: .5; 3 SOLUTION RESPIRATORY (INHALATION) at 13:04

## 2019-01-17 RX ADMIN — OXYCODONE HYDROCHLORIDE SCH MG: 10 TABLET, FILM COATED, EXTENDED RELEASE ORAL at 08:22

## 2019-01-17 RX ADMIN — IPRATROPIUM BROMIDE AND ALBUTEROL SULFATE SCH ML: .5; 3 SOLUTION RESPIRATORY (INHALATION) at 15:34

## 2019-01-17 RX ADMIN — IPRATROPIUM BROMIDE AND ALBUTEROL SULFATE SCH ML: .5; 3 SOLUTION RESPIRATORY (INHALATION) at 19:30

## 2019-01-17 NOTE — CP.PCM.PN
Subjective





- Date & Time of Evaluation


Date of Evaluation: 01/17/19


Time of Evaluation: 14:00





- Subjective


Subjective: 


Patient seen and examined at bedside comfortable. Pain well controlled. No new 

complaints. 








Objective





- Vital Signs/Intake and Output


Vital Signs (last 24 hours): 


                                        











Temp Pulse Resp BP Pulse Ox


 


 98.9 F   74   20   131/71   91 L


 


 01/17/19 15:48  01/17/19 15:48  01/17/19 15:48  01/17/19 15:48  01/17/19 15:48











- Medications


Medications: 


                               Current Medications





Acetaminophen (Tylenol 325mg Tab)  650 mg PO Q4 PRN


   PRN Reason: Pain, Mild (1-3)


Acetaminophen (Tylenol 325mg Tab)  650 mg PO Q4 PRN


   PRN Reason: Fever >100.4 F


Albuterol (Ventolin Hfa 90 Mcg/Actuation (8 G))  1 puff INH RQ6 PRN


   PRN Reason: Shortness of Breath


Albuterol/Ipratropium (Duoneb 3 Mg/0.5 Mg (3 Ml) Ud)  3 ml INH RQID [facility]


   Last Admin: 01/17/19 15:34 Dose:  3 ml


Alprazolam (Xanax)  1 mg PO BID PRN


   PRN Reason: Anxiety


   Last Admin: 01/15/19 21:18 Dose:  1 mg


Aripiprazole (Abilify)  15 mg PO DAILY [facility]


   Last Admin: 01/17/19 08:26 Dose:  15 mg


Atorvastatin Calcium (Lipitor)  40 mg PO HS [facility]


   Last Admin: 01/16/19 21:06 Dose:  40 mg


Bisacodyl (Dulcolax)  10 mg PO DAILY PRN


   PRN Reason: Constipation


Celecoxib (Celebrex)  200 mg PO DAILY [facility]


   Last Admin: 01/17/19 08:25 Dose:  200 mg


Cholecalciferol (Vitamin D)  2,000 intlu PO DAILY [facility]


   Last Admin: 01/17/19 08:25 Dose:  2,000 intlu


Docusate Sodium (Colace)  100 mg PO BID [facility]


   Last Admin: 01/17/19 16:41 Dose:  100 mg


Enoxaparin Sodium (Lovenox)  40 mg SC DAILY [facility]; Protocol


   Last Admin: 01/17/19 08:23 Dose:  40 mg


Escitalopram Oxalate (Lexapro)  20 mg PO DAILY [facility]


   Last Admin: 01/17/19 08:27 Dose:  20 mg


Folic Acid (Folic Acid)  1 mg PO DAILY [facility]


   Last Admin: 01/17/19 08:26 Dose:  1 mg


Gabapentin (Neurontin)  400 mg PO TID [facility]


   Last Admin: 01/17/19 16:41 Dose:  400 mg


Nifedipine (Procardia Xl)  90 mg PO DAILY [facility]


   Last Admin: 01/17/19 08:25 Dose:  90 mg


Ondansetron HCl (Zofran Odt)  4 mg PO Q6 PRN


   PRN Reason: Nausea/Vomiting


Oxycodone HCl (Oxycontin Extended Release Tab)  10 mg PO Q12 [facility]


   Stop: 01/17/19 21:01


   Last Admin: 01/17/19 08:22 Dose:  10 mg


Oxycodone HCl (Oxycodone Immediate Release Tab)  5 mg PO Q4 PRN


   PRN Reason: Pain, moderate (4-7)


   Last Admin: 01/17/19 18:00 Dose:  5 mg


Pantoprazole Sodium (Protonix Ec Tab)  40 mg PO DAILY [facility]


   Last Admin: 01/17/19 08:25 Dose:  40 mg


Potassium Chloride (K-Dur 20 Meq Er Tab)  20 meq PO DAILY [facility]


   Last Admin: 01/17/19 08:25 Dose:  20 meq


Sitagliptin Phosphate (Januvia)  100 mg PO DAILY [facility]


   Last Admin: 01/17/19 08:24 Dose:  100 mg


Tiotropium Bromide (Spiriva)  18 mcg INH DAILY [facility]


   Last Admin: 01/17/19 08:26 Dose:  18 mcg


Trazodone HCl (Desyrel)  200 mg PO HS PRN


   PRN Reason: Insomnia


Zolpidem Tartrate (Ambien)  5 mg PO HS PRN


   PRN Reason: Insomnia


   Last Admin: 01/16/19 02:03 Dose:  5 mg











- Extremities Exam


Additional comments: 


R knee: Dressings CDI


mild swelling and tenderness


sensation intact SP/DP/TN


motor intact EHL/FHL/TA/G


pedal pulses intact


calves soft NT b/l














Assessment and Plan


(1) Status post total right knee replacement


Assessment & Plan: 


POD#7 s/p R TKA


-PT/OT WBAT


-DVT ppx


-ice elevate


-orthopedically stable


-above d/w Dr. Snow in agreement


Status: Acute

## 2019-01-18 RX ADMIN — IPRATROPIUM BROMIDE AND ALBUTEROL SULFATE SCH ML: .5; 3 SOLUTION RESPIRATORY (INHALATION) at 11:33

## 2019-01-18 RX ADMIN — ENOXAPARIN SODIUM SCH MG: 40 INJECTION SUBCUTANEOUS at 08:41

## 2019-01-18 RX ADMIN — Medication PRN MG: at 08:37

## 2019-01-18 RX ADMIN — IPRATROPIUM BROMIDE AND ALBUTEROL SULFATE SCH ML: .5; 3 SOLUTION RESPIRATORY (INHALATION) at 15:33

## 2019-01-18 RX ADMIN — IPRATROPIUM BROMIDE AND ALBUTEROL SULFATE SCH ML: .5; 3 SOLUTION RESPIRATORY (INHALATION) at 08:10

## 2019-01-18 RX ADMIN — OXYCODONE HYDROCHLORIDE SCH MG: 10 TABLET, FILM COATED, EXTENDED RELEASE ORAL at 09:27

## 2019-01-18 RX ADMIN — TIOTROPIUM BROMIDE SCH MCG: 18 CAPSULE ORAL; RESPIRATORY (INHALATION) at 08:41

## 2019-01-18 RX ADMIN — POTASSIUM CHLORIDE SCH MEQ: 20 TABLET, EXTENDED RELEASE ORAL at 08:40

## 2019-01-18 RX ADMIN — NIFEDIPINE SCH MG: 90 TABLET, FILM COATED, EXTENDED RELEASE ORAL at 08:40

## 2019-01-18 RX ADMIN — OXYCODONE HYDROCHLORIDE SCH MG: 10 TABLET, FILM COATED, EXTENDED RELEASE ORAL at 20:46

## 2019-01-18 RX ADMIN — Medication PRN MG: at 16:33

## 2019-01-18 RX ADMIN — IPRATROPIUM BROMIDE AND ALBUTEROL SULFATE SCH ML: .5; 3 SOLUTION RESPIRATORY (INHALATION) at 19:58

## 2019-01-18 RX ADMIN — VITAMIN D, TAB 1000IU (100/BT) SCH INTLU: 25 TAB at 08:40

## 2019-01-18 RX ADMIN — PANTOPRAZOLE SODIUM SCH MG: 40 TABLET, DELAYED RELEASE ORAL at 08:40

## 2019-01-18 RX ADMIN — Medication PRN MG: at 00:59

## 2019-01-18 NOTE — CP.PCM.PN
Subjective





- Date & Time of Evaluation


Date of Evaluation: 01/18/19


Time of Evaluation: 15:22





- Subjective


Subjective: 





Michelle seen in PT, making good progress and set for d/c home soon


ambulating with RW and CS.  Needs some cueing for holding the walker a little 

more in front


no sob/cp


very happy with excellent care by all staff. 


great early surgical outcome. 





Objective





- Vital Signs/Intake and Output


Vital Signs (last 24 hours): 


                                        











Temp Pulse Resp BP Pulse Ox


 


 98.6 F   82   20   117/72   93 L


 


 01/18/19 11:43  01/18/19 11:43  01/18/19 11:43  01/18/19 11:43  01/18/19 11:43











- Medications


Medications: 


                               Current Medications





Acetaminophen (Tylenol 325mg Tab)  650 mg PO Q4 PRN


   PRN Reason: Pain, Mild (1-3)


Acetaminophen (Tylenol 325mg Tab)  650 mg PO Q4 PRN


   PRN Reason: Fever >100.4 F


Albuterol (Ventolin Hfa 90 Mcg/Actuation (8 G))  1 puff INH RQ6 PRN


   PRN Reason: Shortness of Breath


Albuterol/Ipratropium (Duoneb 3 Mg/0.5 Mg (3 Ml) Ud)  3 ml INH RQID [facility]


   Last Admin: 01/18/19 11:33 Dose:  3 ml


Alprazolam (Xanax)  1 mg PO BID PRN


   PRN Reason: Anxiety


   Last Admin: 01/18/19 12:30 Dose:  1 mg


Aripiprazole (Abilify)  15 mg PO DAILY [facility]


   Last Admin: 01/18/19 08:39 Dose:  15 mg


Atorvastatin Calcium (Lipitor)  40 mg PO HS [facility]


   Last Admin: 01/17/19 21:43 Dose:  40 mg


Bisacodyl (Dulcolax)  10 mg PO DAILY PRN


   PRN Reason: Constipation


Celecoxib (Celebrex)  200 mg PO DAILY [facility]


   Last Admin: 01/18/19 08:40 Dose:  200 mg


Cholecalciferol (Vitamin D)  2,000 intlu PO DAILY [facility]


   Last Admin: 01/18/19 08:40 Dose:  2,000 intlu


Docusate Sodium (Colace)  100 mg PO BID [facility]


   Last Admin: 01/18/19 08:39 Dose:  100 mg


Enoxaparin Sodium (Lovenox)  40 mg SC DAILY [facility]; Protocol


   Last Admin: 01/18/19 08:41 Dose:  40 mg


Escitalopram Oxalate (Lexapro)  20 mg PO DAILY [facility]


   Last Admin: 01/18/19 08:40 Dose:  20 mg


Folic Acid (Folic Acid)  1 mg PO DAILY [facility]


   Last Admin: 01/18/19 08:40 Dose:  1 mg


Gabapentin (Neurontin)  400 mg PO TID [facility]


   Last Admin: 01/18/19 12:31 Dose:  400 mg


Nifedipine (Procardia Xl)  90 mg PO DAILY [facility]


   Last Admin: 01/18/19 08:40 Dose:  90 mg


Ondansetron HCl (Zofran Odt)  4 mg PO Q6 PRN


   PRN Reason: Nausea/Vomiting


Oxycodone HCl (Oxycodone Immediate Release Tab)  5 mg PO Q4 PRN


   PRN Reason: Pain, moderate (4-7)


   Last Admin: 01/18/19 08:37 Dose:  5 mg


Oxycodone HCl (Oxycontin Extended Release Tab)  10 mg PO Q12 [facility]


   Stop: 01/21/19 09:01


   Last Admin: 01/18/19 09:27 Dose:  10 mg


Pantoprazole Sodium (Protonix Ec Tab)  40 mg PO DAILY [facility]


   Last Admin: 01/18/19 08:40 Dose:  40 mg


Potassium Chloride (K-Dur 20 Meq Er Tab)  20 meq PO DAILY [facility]


   Last Admin: 01/18/19 08:40 Dose:  20 meq


Sitagliptin Phosphate (Januvia)  100 mg PO DAILY [facility]


   Last Admin: 01/18/19 08:40 Dose:  100 mg


Tiotropium Bromide (Spiriva)  18 mcg INH DAILY [facility]


   Last Admin: 01/18/19 08:41 Dose:  18 mcg


Trazodone HCl (Desyrel)  200 mg PO HS PRN


   PRN Reason: Insomnia


Zolpidem Tartrate (Ambien)  5 mg PO HS PRN


   PRN Reason: Insomnia


   Last Admin: 01/16/19 02:03 Dose:  5 mg

## 2019-01-19 RX ADMIN — OXYCODONE HYDROCHLORIDE SCH MG: 10 TABLET, FILM COATED, EXTENDED RELEASE ORAL at 20:30

## 2019-01-19 RX ADMIN — TIOTROPIUM BROMIDE SCH MCG: 18 CAPSULE ORAL; RESPIRATORY (INHALATION) at 09:29

## 2019-01-19 RX ADMIN — IPRATROPIUM BROMIDE AND ALBUTEROL SULFATE SCH ML: .5; 3 SOLUTION RESPIRATORY (INHALATION) at 07:44

## 2019-01-19 RX ADMIN — POTASSIUM CHLORIDE SCH MEQ: 20 TABLET, EXTENDED RELEASE ORAL at 09:24

## 2019-01-19 RX ADMIN — Medication PRN MG: at 01:06

## 2019-01-19 RX ADMIN — NIFEDIPINE SCH MG: 90 TABLET, FILM COATED, EXTENDED RELEASE ORAL at 09:29

## 2019-01-19 RX ADMIN — IPRATROPIUM BROMIDE AND ALBUTEROL SULFATE SCH ML: .5; 3 SOLUTION RESPIRATORY (INHALATION) at 16:19

## 2019-01-19 RX ADMIN — IPRATROPIUM BROMIDE AND ALBUTEROL SULFATE SCH ML: .5; 3 SOLUTION RESPIRATORY (INHALATION) at 11:17

## 2019-01-19 RX ADMIN — Medication PRN MG: at 16:40

## 2019-01-19 RX ADMIN — IPRATROPIUM BROMIDE AND ALBUTEROL SULFATE SCH ML: .5; 3 SOLUTION RESPIRATORY (INHALATION) at 19:25

## 2019-01-19 RX ADMIN — OXYCODONE HYDROCHLORIDE SCH MG: 10 TABLET, FILM COATED, EXTENDED RELEASE ORAL at 09:27

## 2019-01-19 RX ADMIN — ENOXAPARIN SODIUM SCH MG: 40 INJECTION SUBCUTANEOUS at 09:26

## 2019-01-19 RX ADMIN — Medication PRN MG: at 08:00

## 2019-01-19 RX ADMIN — PANTOPRAZOLE SODIUM SCH MG: 40 TABLET, DELAYED RELEASE ORAL at 09:29

## 2019-01-19 RX ADMIN — VITAMIN D, TAB 1000IU (100/BT) SCH INTLU: 25 TAB at 09:29

## 2019-01-20 LAB
ALBUMIN SERPL-MCNC: 4 G/DL (ref 3.5–5)
ALBUMIN/GLOB SERPL: 1.1 {RATIO} (ref 1–2.1)
ALT SERPL-CCNC: 17 U/L (ref 9–52)
AST SERPL-CCNC: 25 U/L (ref 14–36)
BUN SERPL-MCNC: 9 MG/DL (ref 7–17)
CALCIUM SERPL-MCNC: 9.8 MG/DL (ref 8.4–10.2)
ERYTHROCYTE [DISTWIDTH] IN BLOOD BY AUTOMATED COUNT: 15.6 % (ref 11.5–14.5)
GFR NON-AFRICAN AMERICAN: > 60
HGB BLD-MCNC: 12.4 G/DL (ref 12–16)
MCH RBC QN AUTO: 30.2 PG (ref 27–31)
MCHC RBC AUTO-ENTMCNC: 32.7 G/DL (ref 33–37)
MCV RBC AUTO: 92.4 FL (ref 81–99)
PLATELET # BLD: 220 K/UL (ref 130–400)
RBC # BLD AUTO: 4.1 MIL/UL (ref 3.8–5.2)
WBC # BLD AUTO: 4.6 K/UL (ref 4.8–10.8)

## 2019-01-20 RX ADMIN — Medication PRN MG: at 05:46

## 2019-01-20 RX ADMIN — IPRATROPIUM BROMIDE AND ALBUTEROL SULFATE SCH ML: .5; 3 SOLUTION RESPIRATORY (INHALATION) at 07:53

## 2019-01-20 RX ADMIN — IPRATROPIUM BROMIDE AND ALBUTEROL SULFATE SCH ML: .5; 3 SOLUTION RESPIRATORY (INHALATION) at 19:24

## 2019-01-20 RX ADMIN — ENOXAPARIN SODIUM SCH MG: 40 INJECTION SUBCUTANEOUS at 08:14

## 2019-01-20 RX ADMIN — POTASSIUM CHLORIDE SCH MEQ: 20 TABLET, EXTENDED RELEASE ORAL at 08:16

## 2019-01-20 RX ADMIN — TIOTROPIUM BROMIDE SCH MCG: 18 CAPSULE ORAL; RESPIRATORY (INHALATION) at 08:17

## 2019-01-20 RX ADMIN — Medication PRN MG: at 13:35

## 2019-01-20 RX ADMIN — Medication PRN MG: at 00:32

## 2019-01-20 RX ADMIN — PANTOPRAZOLE SODIUM SCH MG: 40 TABLET, DELAYED RELEASE ORAL at 08:16

## 2019-01-20 RX ADMIN — IPRATROPIUM BROMIDE AND ALBUTEROL SULFATE SCH ML: .5; 3 SOLUTION RESPIRATORY (INHALATION) at 11:57

## 2019-01-20 RX ADMIN — VITAMIN D, TAB 1000IU (100/BT) SCH INTLU: 25 TAB at 08:16

## 2019-01-20 RX ADMIN — OXYCODONE HYDROCHLORIDE SCH MG: 10 TABLET, FILM COATED, EXTENDED RELEASE ORAL at 20:33

## 2019-01-20 RX ADMIN — IPRATROPIUM BROMIDE AND ALBUTEROL SULFATE SCH: .5; 3 SOLUTION RESPIRATORY (INHALATION) at 16:43

## 2019-01-20 RX ADMIN — NIFEDIPINE SCH MG: 90 TABLET, FILM COATED, EXTENDED RELEASE ORAL at 08:14

## 2019-01-20 RX ADMIN — OXYCODONE HYDROCHLORIDE SCH MG: 10 TABLET, FILM COATED, EXTENDED RELEASE ORAL at 08:14

## 2019-01-20 NOTE — CP.PCM.PN
Subjective





- Date & Time of Evaluation


Date of Evaluation: 01/20/19


Time of Evaluation: 12:26





- Subjective


Subjective: 





patient seen and examined at bedside.


Interim events noted


No complaints offered at this time


denies cp/sob/fever/chills.


available diagnostic data reviewed





Objective





Vital Signs Stable


- Constitutional


Appears: Non-toxic, No Acute Distress





- Head Exam


Head Exam: NORMAL INSPECTION





- Eye Exam


Eye Exam: Normal appearance





- Respiratory Exam


Respiratory Exam: NORMAL BREATHING PATTERN





- Cardiovascular Exam


Cardiovascular Exam: +S1, +S2





- GI/Abdominal Exam


GI & Abdominal Exam: Soft





- Neurological Exam


Neurological Exam: Alert, Awake





- Psychiatric Exam


Psychiatric exam: Normal Affect, Normal Mood





- Skin


Skin Exam: Normal Color, Warm





Assessment and Plan





monitor vitals


monitor labs


Cont meds


Cont tx


consultants appreciated input


rest of plan as ordered








Assessment and Plan


(1) Status post total right knee replacement


Status: Acute

## 2019-01-21 VITALS
HEART RATE: 101 BPM | TEMPERATURE: 97.7 F | OXYGEN SATURATION: 96 % | DIASTOLIC BLOOD PRESSURE: 74 MMHG | SYSTOLIC BLOOD PRESSURE: 142 MMHG

## 2019-01-21 RX ADMIN — ENOXAPARIN SODIUM SCH MG: 40 INJECTION SUBCUTANEOUS at 08:29

## 2019-01-21 RX ADMIN — OXYCODONE HYDROCHLORIDE SCH MG: 10 TABLET, FILM COATED, EXTENDED RELEASE ORAL at 08:28

## 2019-01-21 RX ADMIN — Medication PRN MG: at 00:40

## 2019-01-21 RX ADMIN — TIOTROPIUM BROMIDE SCH MCG: 18 CAPSULE ORAL; RESPIRATORY (INHALATION) at 08:30

## 2019-01-21 RX ADMIN — NIFEDIPINE SCH MG: 90 TABLET, FILM COATED, EXTENDED RELEASE ORAL at 08:40

## 2019-01-21 RX ADMIN — IPRATROPIUM BROMIDE AND ALBUTEROL SULFATE SCH: .5; 3 SOLUTION RESPIRATORY (INHALATION) at 11:24

## 2019-01-21 RX ADMIN — PANTOPRAZOLE SODIUM SCH MG: 40 TABLET, DELAYED RELEASE ORAL at 08:40

## 2019-01-21 RX ADMIN — VITAMIN D, TAB 1000IU (100/BT) SCH INTLU: 25 TAB at 08:31

## 2019-01-21 RX ADMIN — POTASSIUM CHLORIDE SCH MEQ: 20 TABLET, EXTENDED RELEASE ORAL at 08:31

## 2019-01-21 RX ADMIN — IPRATROPIUM BROMIDE AND ALBUTEROL SULFATE SCH ML: .5; 3 SOLUTION RESPIRATORY (INHALATION) at 07:47

## 2019-01-21 NOTE — CP.PCM.PN
Subjective





- Date & Time of Evaluation


Date of Evaluation: 01/17/19


Time of Evaluation: 10:00





- Subjective


Subjective: 





Patient is stable


Has no chest pain or SOB


Less anxious


On xanax 1 mg bid





Objective





- Vital Signs/Intake and Output


Vital Signs (last 24 hours): 


                                        











Temp Pulse Resp BP Pulse Ox


 


 97.7 F   101 H  20   142/74   96 


 


 01/21/19 08:35  01/21/19 08:35  01/21/19 08:35  01/21/19 08:35  01/21/19 08:35











- Medications


Medications: 


                               Current Medications





Acetaminophen (Tylenol 325mg Tab)  650 mg PO Q4 PRN


   PRN Reason: Pain, Mild (1-3)


Acetaminophen (Tylenol 325mg Tab)  650 mg PO Q4 PRN


   PRN Reason: Fever >100.4 F


Albuterol (Ventolin Hfa 90 Mcg/Actuation (8 G))  1 puff INH RQ6 PRN


   PRN Reason: Shortness of Breath


Albuterol/Ipratropium (Duoneb 3 Mg/0.5 Mg (3 Ml) Ud)  3 ml INH RQID [facility]


   Last Admin: 01/21/19 07:47 Dose:  3 ml


Alprazolam (Xanax)  1 mg PO BID PRN


   PRN Reason: Anxiety


   Last Admin: 01/20/19 20:35 Dose:  1 mg


Aripiprazole (Abilify)  15 mg PO DAILY [facility]


   Last Admin: 01/21/19 08:29 Dose:  15 mg


Atorvastatin Calcium (Lipitor)  40 mg PO HS [facility]


   Last Admin: 01/20/19 22:45 Dose:  40 mg


Bisacodyl (Dulcolax)  10 mg PO DAILY PRN


   PRN Reason: Constipation


Celecoxib (Celebrex)  200 mg PO DAILY [facility]


   Last Admin: 01/21/19 08:30 Dose:  200 mg


Cholecalciferol (Vitamin D)  2,000 intlu PO DAILY [facility]


   Last Admin: 01/21/19 08:31 Dose:  2,000 intlu


Docusate Sodium (Colace)  100 mg PO BID [facility]


   Last Admin: 01/21/19 08:29 Dose:  100 mg


Enoxaparin Sodium (Lovenox)  40 mg SC DAILY [facility]; Protocol


   Last Admin: 01/21/19 08:29 Dose:  40 mg


Escitalopram Oxalate (Lexapro)  20 mg PO DAILY [facility]


   Last Admin: 01/21/19 08:31 Dose:  20 mg


Folic Acid (Folic Acid)  1 mg PO DAILY [facility]


   Last Admin: 01/21/19 08:31 Dose:  1 mg


Gabapentin (Neurontin)  400 mg PO TID [facility]


   Last Admin: 01/21/19 08:29 Dose:  400 mg


Nicotine (Nicoderm Cq)  1 patch TD DAILY [facility]


   Last Admin: 01/21/19 08:28 Dose:  1 patch


Nifedipine (Procardia Xl)  90 mg PO DAILY [facility]


   Last Admin: 01/21/19 08:40 Dose:  90 mg


Ondansetron HCl (Zofran Odt)  4 mg PO Q6 PRN


   PRN Reason: Nausea/Vomiting


Oxycodone HCl (Oxycodone Immediate Release Tab)  5 mg PO Q4 PRN


   PRN Reason: Pain, moderate (4-7)


   Last Admin: 01/21/19 00:40 Dose:  5 mg


Oxycodone HCl (Oxycontin Extended Release Tab)  10 mg PO Q12 [facility]


   Stop: 01/21/19 09:01


   Last Admin: 01/21/19 08:28 Dose:  10 mg


Pantoprazole Sodium (Protonix Ec Tab)  40 mg PO DAILY [facility]


   Last Admin: 01/21/19 08:40 Dose:  40 mg


Potassium Chloride (K-Dur 20 Meq Er Tab)  20 meq PO DAILY [facility]


   Last Admin: 01/21/19 08:31 Dose:  20 meq


Sitagliptin Phosphate (Januvia)  100 mg PO DAILY [facility]


   Last Admin: 01/21/19 08:30 Dose:  100 mg


Tiotropium Bromide (Spiriva)  18 mcg INH DAILY [facility]


   Last Admin: 01/21/19 08:30 Dose:  18 mcg


Trazodone HCl (Desyrel)  200 mg PO HS PRN


   PRN Reason: Insomnia


Zolpidem Tartrate (Ambien)  5 mg PO HS PRN


   PRN Reason: Insomnia


   Last Admin: 01/20/19 20:34 Dose:  5 mg











- Labs


Labs: 


                                        





                                 01/20/19 05:40 





                                 01/20/19 05:40 











- Head Exam


Head Exam: NORMAL INSPECTION





- Eye Exam


Eye Exam: Normal appearance





- ENT Exam


ENT Exam: Mucous Membranes Moist





- Respiratory Exam


Respiratory Exam: Clear to Ausculation Bilateral





- Cardiovascular Exam


Cardiovascular Exam: REGULAR RHYTHM





- GI/Abdominal Exam


GI & Abdominal Exam: Normal Bowel Sounds





- Neurological Exam


Neurological Exam: Awake, Oriented x3





Assessment and Plan


(1) Gait difficulty


Status: Acute   





(2) Status post total right knee replacement


Status: Acute   





(3) Anxiety


Status: Acute   





(4) COPD (chronic obstructive pulmonary disease)


Status: Acute   





(5) Depression


Status: Acute   





(6) Diabetes mellitus type 2 in obese


Status: Acute   





(7) HTN (hypertension)


Status: Acute   





(8) Hyperlipidemia


Status: Acute   





- Assessment and Plan (Free Text)


Plan: 





Cont meds


 Cont to encouraged phys therapy


Cont tx

## 2019-01-21 NOTE — CP.PCM.PN
Subjective





- Date & Time of Evaluation


Date of Evaluation: 01/16/19


Time of Evaluation: 11:00





- Subjective


Subjective: 





Patient desires to go home


Very anxious.


She was reassured that she will have PT and that she has no assistance at home.


Has minimal pain


Tolerating Phys therapy.





Objective





- Vital Signs/Intake and Output


Vital Signs (last 24 hours): 


                                        











Temp Pulse Resp BP Pulse Ox


 


 97.7 F   101 H  20   142/74   96 


 


 01/21/19 08:35  01/21/19 08:35  01/21/19 08:35  01/21/19 08:35  01/21/19 08:35











- Medications


Medications: 


                               Current Medications





Acetaminophen (Tylenol 325mg Tab)  650 mg PO Q4 PRN


   PRN Reason: Pain, Mild (1-3)


Acetaminophen (Tylenol 325mg Tab)  650 mg PO Q4 PRN


   PRN Reason: Fever >100.4 F


Albuterol (Ventolin Hfa 90 Mcg/Actuation (8 G))  1 puff INH RQ6 PRN


   PRN Reason: Shortness of Breath


Albuterol/Ipratropium (Duoneb 3 Mg/0.5 Mg (3 Ml) Ud)  3 ml INH RQID [facility]


   Last Admin: 01/21/19 07:47 Dose:  3 ml


Alprazolam (Xanax)  1 mg PO BID PRN


   PRN Reason: Anxiety


   Last Admin: 01/20/19 20:35 Dose:  1 mg


Aripiprazole (Abilify)  15 mg PO DAILY [facility]


   Last Admin: 01/21/19 08:29 Dose:  15 mg


Atorvastatin Calcium (Lipitor)  40 mg PO HS [facility]


   Last Admin: 01/20/19 22:45 Dose:  40 mg


Bisacodyl (Dulcolax)  10 mg PO DAILY PRN


   PRN Reason: Constipation


Celecoxib (Celebrex)  200 mg PO DAILY [facility]


   Last Admin: 01/21/19 08:30 Dose:  200 mg


Cholecalciferol (Vitamin D)  2,000 intlu PO DAILY [facility]


   Last Admin: 01/21/19 08:31 Dose:  2,000 intlu


Docusate Sodium (Colace)  100 mg PO BID [facility]


   Last Admin: 01/21/19 08:29 Dose:  100 mg


Enoxaparin Sodium (Lovenox)  40 mg SC DAILY [facility]; Protocol


   Last Admin: 01/21/19 08:29 Dose:  40 mg


Escitalopram Oxalate (Lexapro)  20 mg PO DAILY [facility]


   Last Admin: 01/21/19 08:31 Dose:  20 mg


Folic Acid (Folic Acid)  1 mg PO DAILY [facility]


   Last Admin: 01/21/19 08:31 Dose:  1 mg


Gabapentin (Neurontin)  400 mg PO TID [facility]


   Last Admin: 01/21/19 08:29 Dose:  400 mg


Nicotine (Nicoderm Cq)  1 patch TD DAILY [facility]


   Last Admin: 01/21/19 08:28 Dose:  1 patch


Nifedipine (Procardia Xl)  90 mg PO DAILY [facility]


   Last Admin: 01/20/19 08:14 Dose:  90 mg


Ondansetron HCl (Zofran Odt)  4 mg PO Q6 PRN


   PRN Reason: Nausea/Vomiting


Oxycodone HCl (Oxycodone Immediate Release Tab)  5 mg PO Q4 PRN


   PRN Reason: Pain, moderate (4-7)


   Last Admin: 01/21/19 00:40 Dose:  5 mg


Oxycodone HCl (Oxycontin Extended Release Tab)  10 mg PO Q12 [facility]


   Stop: 01/21/19 09:01


   Last Admin: 01/21/19 08:28 Dose:  10 mg


Pantoprazole Sodium (Protonix Ec Tab)  40 mg PO DAILY [facility]


   Last Admin: 01/20/19 08:16 Dose:  40 mg


Potassium Chloride (K-Dur 20 Meq Er Tab)  20 meq PO DAILY [facility]


   Last Admin: 01/21/19 08:31 Dose:  20 meq


Sitagliptin Phosphate (Januvia)  100 mg PO DAILY [facility]


   Last Admin: 01/21/19 08:30 Dose:  100 mg


Tiotropium Bromide (Spiriva)  18 mcg INH DAILY [facility]


   Last Admin: 01/21/19 08:30 Dose:  18 mcg


Trazodone HCl (Desyrel)  200 mg PO HS PRN


   PRN Reason: Insomnia


Zolpidem Tartrate (Ambien)  5 mg PO HS PRN


   PRN Reason: Insomnia


   Last Admin: 01/20/19 20:34 Dose:  5 mg











- Labs


Labs: 


                                        





                                 01/20/19 05:40 





                                 01/20/19 05:40 











- Head Exam


Head Exam: NORMAL INSPECTION





- Eye Exam


Eye Exam: Normal appearance





- ENT Exam


ENT Exam: Mucous Membranes Moist





- Respiratory Exam


Respiratory Exam: Clear to Ausculation Bilateral





- Cardiovascular Exam


Cardiovascular Exam: REGULAR RHYTHM





- GI/Abdominal Exam


GI & Abdominal Exam: Normal Bowel Sounds





- Neurological Exam


Neurological Exam: Awake





- Psychiatric Exam


Psychiatric exam: Normal Mood





Assessment and Plan


(1) Gait difficulty


Status: Acute   





(2) Status post total right knee replacement


Status: Acute   





(3) Anxiety


Status: Acute   





(4) COPD (chronic obstructive pulmonary disease)


Status: Acute   





(5) Depression


Status: Acute   





(6) Diabetes mellitus type 2 in obese


Status: Acute   





(7) HTN (hypertension)


Status: Acute   





(8) Hyperlipidemia


Status: Acute   





- Assessment and Plan (Free Text)


Plan: 





Cont meds


 Cont PT


 Cont pain meds prn


check labs regularly

## 2019-01-21 NOTE — CP.PCM.PN
Subjective





- Date & Time of Evaluation


Date of Evaluation: 01/18/19


Time of Evaluation: 11:00





- Subjective


Subjective: 





Patient is doing well with PT


Still  needs assistance in walking


Has minimal pain


Gets anxious a lot.





Objective





- Vital Signs/Intake and Output


Vital Signs (last 24 hours): 


                                        











Temp Pulse Resp BP Pulse Ox


 


 97.7 F   101 H  20   142/74   96 


 


 01/21/19 08:35  01/21/19 08:35  01/21/19 08:35  01/21/19 08:35  01/21/19 08:35











- Medications


Medications: 


                               Current Medications





Acetaminophen (Tylenol 325mg Tab)  650 mg PO Q4 PRN


   PRN Reason: Pain, Mild (1-3)


Acetaminophen (Tylenol 325mg Tab)  650 mg PO Q4 PRN


   PRN Reason: Fever >100.4 F


Albuterol (Ventolin Hfa 90 Mcg/Actuation (8 G))  1 puff INH RQ6 PRN


   PRN Reason: Shortness of Breath


Albuterol/Ipratropium (Duoneb 3 Mg/0.5 Mg (3 Ml) Ud)  3 ml INH RQID [facility]


   Last Admin: 01/21/19 07:47 Dose:  3 ml


Alprazolam (Xanax)  1 mg PO BID PRN


   PRN Reason: Anxiety


   Last Admin: 01/20/19 20:35 Dose:  1 mg


Aripiprazole (Abilify)  15 mg PO DAILY [facility]


   Last Admin: 01/21/19 08:29 Dose:  15 mg


Atorvastatin Calcium (Lipitor)  40 mg PO HS [facility]


   Last Admin: 01/20/19 22:45 Dose:  40 mg


Bisacodyl (Dulcolax)  10 mg PO DAILY PRN


   PRN Reason: Constipation


Celecoxib (Celebrex)  200 mg PO DAILY [facility]


   Last Admin: 01/21/19 08:30 Dose:  200 mg


Cholecalciferol (Vitamin D)  2,000 intlu PO DAILY [facility]


   Last Admin: 01/21/19 08:31 Dose:  2,000 intlu


Docusate Sodium (Colace)  100 mg PO BID [facility]


   Last Admin: 01/21/19 08:29 Dose:  100 mg


Enoxaparin Sodium (Lovenox)  40 mg SC DAILY [facility]; Protocol


   Last Admin: 01/21/19 08:29 Dose:  40 mg


Escitalopram Oxalate (Lexapro)  20 mg PO DAILY [facility]


   Last Admin: 01/21/19 08:31 Dose:  20 mg


Folic Acid (Folic Acid)  1 mg PO DAILY [facility]


   Last Admin: 01/21/19 08:31 Dose:  1 mg


Gabapentin (Neurontin)  400 mg PO TID [facility]


   Last Admin: 01/21/19 08:29 Dose:  400 mg


Nicotine (Nicoderm Cq)  1 patch TD DAILY [facility]


   Last Admin: 01/21/19 08:28 Dose:  1 patch


Nifedipine (Procardia Xl)  90 mg PO DAILY [facility]


   Last Admin: 01/21/19 08:40 Dose:  90 mg


Ondansetron HCl (Zofran Odt)  4 mg PO Q6 PRN


   PRN Reason: Nausea/Vomiting


Oxycodone HCl (Oxycodone Immediate Release Tab)  5 mg PO Q4 PRN


   PRN Reason: Pain, moderate (4-7)


   Last Admin: 01/21/19 00:40 Dose:  5 mg


Oxycodone HCl (Oxycontin Extended Release Tab)  10 mg PO Q12 [facility]


   Stop: 01/21/19 09:01


   Last Admin: 01/21/19 08:28 Dose:  10 mg


Pantoprazole Sodium (Protonix Ec Tab)  40 mg PO DAILY [facility]


   Last Admin: 01/21/19 08:40 Dose:  40 mg


Potassium Chloride (K-Dur 20 Meq Er Tab)  20 meq PO DAILY [facility]


   Last Admin: 01/21/19 08:31 Dose:  20 meq


Sitagliptin Phosphate (Januvia)  100 mg PO DAILY [facility]


   Last Admin: 01/21/19 08:30 Dose:  100 mg


Tiotropium Bromide (Spiriva)  18 mcg INH DAILY [facility]


   Last Admin: 01/21/19 08:30 Dose:  18 mcg


Trazodone HCl (Desyrel)  200 mg PO HS PRN


   PRN Reason: Insomnia


Zolpidem Tartrate (Ambien)  5 mg PO HS PRN


   PRN Reason: Insomnia


   Last Admin: 01/20/19 20:34 Dose:  5 mg











- Labs


Labs: 


                                        





                                 01/20/19 05:40 





                                 01/20/19 05:40 











- Head Exam


Head Exam: NORMAL INSPECTION





- Eye Exam


Eye Exam: Normal appearance





- ENT Exam


ENT Exam: Mucous Membranes Moist





- Respiratory Exam


Respiratory Exam: Clear to Ausculation Bilateral, NORMAL BREATHING PATTERN





- Cardiovascular Exam


Cardiovascular Exam: REGULAR RHYTHM





- GI/Abdominal Exam


GI & Abdominal Exam: Normal Bowel Sounds





Assessment and Plan


(1) Gait difficulty


Status: Acute   





(2) Status post total right knee replacement


Status: Acute   





(3) Anxiety


Status: Acute   





(4) COPD (chronic obstructive pulmonary disease)


Status: Acute   





(5) Depression


Status: Acute   





(6) Diabetes mellitus type 2 in obese


Status: Acute   





(7) HTN (hypertension)


Status: Acute   





(8) Hyperlipidemia


Status: Acute   





- Assessment and Plan (Free Text)


Plan: 





Cont meds


Cont tx


Cont PT

## 2019-01-21 NOTE — CP.PCM.PN
Subjective





- Date & Time of Evaluation


Date of Evaluation: 01/19/19


Time of Evaluation: 10:35





- Subjective


Subjective: 





Patient remains stable


Less anxious


Has no fever.





Objective





- Vital Signs/Intake and Output


Vital Signs (last 24 hours): 


                                        











Temp Pulse Resp BP Pulse Ox


 


 97.7 F   101 H  20   142/74   96 


 


 01/21/19 08:35  01/21/19 08:35  01/21/19 08:35  01/21/19 08:35  01/21/19 08:35











- Medications


Medications: 


                               Current Medications





Acetaminophen (Tylenol 325mg Tab)  650 mg PO Q4 PRN


   PRN Reason: Pain, Mild (1-3)


Acetaminophen (Tylenol 325mg Tab)  650 mg PO Q4 PRN


   PRN Reason: Fever >100.4 F


Albuterol (Ventolin Hfa 90 Mcg/Actuation (8 G))  1 puff INH RQ6 PRN


   PRN Reason: Shortness of Breath


Albuterol/Ipratropium (Duoneb 3 Mg/0.5 Mg (3 Ml) Ud)  3 ml [facility]H [facility]ID [facility]


   Last Admin: 01/21/19 07:47 Dose:  3 ml


Alprazolam (Xanax)  1 mg PO BID PRN


   PRN Reason: Anxiety


   Last Admin: 01/20/19 20:35 Dose:  1 mg


Aripiprazole (Abilify)  15 mg PO DAILY [facility]


   Last Admin: 01/21/19 08:29 Dose:  15 mg


Atorvastatin Calcium (Lipitor)  40 mg PO HS [facility]


   Last Admin: 01/20/19 22:45 Dose:  40 mg


Bisacodyl (Dulcolax)  10 mg PO DAILY PRN


   PRN Reason: Constipation


Celecoxib (Celebrex)  200 mg PO DAILY [facility]


   Last Admin: 01/21/19 08:30 Dose:  200 mg


Cholecalciferol (Vitamin D)  2,000 intlu PO DAILY [facility]


   Last Admin: 01/21/19 08:31 Dose:  2,000 intlu


Docusate Sodium (Colace)  100 mg PO BID [facility]


   Last Admin: 01/21/19 08:29 Dose:  100 mg


Enoxaparin Sodium (Lovenox)  40 mg SC DAILY [facility]; [facility]


   Last Admin: 01/21/19 08:29 Dose:  40 mg


Escitalopram Oxalate (Lexapro)  20 mg PO DAILY [facility]


   Last Admin: 01/21/19 08:31 Dose:  20 mg


Folic Acid (Folic Acid)  1 mg PO DAILY [facility]


   Last Admin: 01/21/19 08:31 Dose:  1 mg


Gabapentin (Neurontin)  400 mg PO TID [facility]


   Last Admin: 01/21/19 08:29 Dose:  400 mg


Nicotine (Nicoderm Cq)  1 patch TD DAILY [facility]


   Last Admin: 01/21/19 08:28 Dose:  1 patch


Nifedipine (Procardia Xl)  90 mg PO DAILY [facility]


   Last Admin: 01/21/19 08:40 Dose:  90 mg


Ondansetron HCl (Zofran Odt)  4 mg PO Q6 PRN


   PRN Reason: Nausea/Vomiting


Oxycodone HCl (Oxycodone Immediate Release Tab)  5 mg PO Q4 PRN


   PRN Reason: Pain, moderate (4-7)


   Last Admin: 01/21/19 00:40 Dose:  5 mg


Oxycodone HCl (Oxycontin Extended Release Tab)  10 mg PO Q12 [facility]


   Stop: 01/21/19 09:01


   Last Admin: 01/21/19 08:28 Dose:  10 mg


Pantoprazole Sodium (Protonix Ec Tab)  40 mg PO DAILY [facility]


   Last Admin: 01/21/19 08:40 Dose:  40 mg


Potassium Chloride (K-Dur 20 Meq Er Tab)  20 meq PO DAILY [facility]


   Last Admin: 01/21/19 08:31 Dose:  20 meq


Sitagliptin Phosphate (Januvia)  100 mg PO DAILY [facility]


   Last Admin: 01/21/19 08:30 Dose:  100 mg


Tiotropium Bromide (Spiriva)  18 mcg INH DAILY [facility]


   Last Admin: 01/21/19 08:30 Dose:  18 mcg


Trazodone HCl (Desyrel)  200 mg PO HS PRN


   PRN Reason: Insomnia


Zolpidem Tartrate (Ambien)  5 mg PO HS PRN


   PRN Reason: Insomnia


   Last Admin: 01/20/19 20:34 Dose:  5 mg











- Labs


Labs: 


                                        





                                 01/20/19 05:40 





                                 01/20/19 05:40 











- Head Exam


Head Exam: NORMAL INSPECTION





- Eye Exam


Eye Exam: Normal appearance





- ENT Exam


ENT Exam: Mucous Membranes Moist





- Respiratory Exam


Respiratory Exam: Clear to Ausculation Bilateral





- Cardiovascular Exam


Cardiovascular Exam: REGULAR RHYTHM





- GI/Abdominal Exam


GI & Abdominal Exam: Normal Bowel Sounds





- Neurological Exam


Neurological Exam: Awake, Oriented x3





Assessment and Plan


(1) Gait difficulty


Status: Acute   





(2) Status post total right knee replacement


Status: Acute   





(3) Anxiety


Status: Acute   





(4) COPD (chronic obstructive pulmonary disease)


Status: Acute   





(5) Depression


Status: Acute   





(6) Diabetes mellitus type 2 in obese


Status: Acute   





(7) HTN (hypertension)


Status: Acute   





(8) Hyperlipidemia


Status: Acute   





- Assessment and Plan (Free Text)


Plan: 





Cont meds


Cont tx


Cont PT


reassured patient

## 2019-01-21 NOTE — CP.PCM.PN
Subjective





- Date & Time of Evaluation


Date of Evaluation: 01/21/19


Time of Evaluation: 07:30





- Subjective


Subjective: 


Patient seen and examined at bedside comfortable. Pain well controlled and 

tolerating PT well. Scheduled to d/c home today. No other complaints.








Objective





- Vital Signs/Intake and Output


Vital Signs (last 24 hours): 


                                        











Temp Pulse Resp BP Pulse Ox


 


 97.7 F   101 H  20   142/74   96 


 


 01/21/19 08:35  01/21/19 08:35  01/21/19 08:35  01/21/19 08:35  01/21/19 08:35











- Medications


Medications: 


                               Current Medications





Acetaminophen (Tylenol 325mg Tab)  650 mg PO Q4 PRN


   PRN Reason: Pain, Mild (1-3)


Acetaminophen (Tylenol 325mg Tab)  650 mg PO Q4 PRN


   PRN Reason: Fever >100.4 F


Albuterol (Ventolin Hfa 90 Mcg/Actuation (8 G))  1 puff INH RQ6 PRN


   PRN Reason: Shortness of Breath


Albuterol/Ipratropium (Duoneb 3 Mg/0.5 Mg (3 Ml) Ud)  3 ml INH RQID [facility]


   Last Admin: 01/21/19 07:47 Dose:  3 ml


Alprazolam (Xanax)  1 mg PO BID PRN


   PRN Reason: Anxiety


   Last Admin: 01/20/19 20:35 Dose:  1 mg


Aripiprazole (Abilify)  15 mg PO DAILY [facility]


   Last Admin: 01/21/19 08:29 Dose:  15 mg


Atorvastatin Calcium (Lipitor)  40 mg PO HS [facility]


   Last Admin: 01/20/19 22:45 Dose:  40 mg


Bisacodyl (Dulcolax)  10 mg PO DAILY PRN


   PRN Reason: Constipation


Celecoxib (Celebrex)  200 mg PO DAILY [facility]


   Last Admin: 01/21/19 08:30 Dose:  200 mg


Cholecalciferol (Vitamin D)  2,000 intlu PO DAILY [facility]


   Last Admin: 01/21/19 08:31 Dose:  2,000 intlu


Docusate Sodium (Colace)  100 mg PO BID [facility]


   Last Admin: 01/21/19 08:29 Dose:  100 mg


Enoxaparin Sodium (Lovenox)  40 mg SC DAILY [facility]; Protocol


   Last Admin: 01/21/19 08:29 Dose:  40 mg


Escitalopram Oxalate (Lexapro)  20 mg PO DAILY [facility]


   Last Admin: 01/21/19 08:31 Dose:  20 mg


Folic Acid (Folic Acid)  1 mg PO DAILY [facility]


   Last Admin: 01/21/19 08:31 Dose:  1 mg


Gabapentin (Neurontin)  400 mg PO TID [facility]


   Last Admin: 01/21/19 08:29 Dose:  400 mg


Nicotine (Nicoderm Cq)  1 patch TD DAILY [facility]


   Last Admin: 01/21/19 08:28 Dose:  1 patch


Nifedipine (Procardia Xl)  90 mg PO DAILY [facility]


   Last Admin: 01/21/19 08:40 Dose:  90 mg


Ondansetron HCl (Zofran Odt)  4 mg PO Q6 PRN


   PRN Reason: Nausea/Vomiting


Oxycodone HCl (Oxycodone Immediate Release Tab)  5 mg PO Q4 PRN


   PRN Reason: Pain, moderate (4-7)


   Last Admin: 01/21/19 00:40 Dose:  5 mg


Pantoprazole Sodium (Protonix Ec Tab)  40 mg PO DAILY [facility]


   Last Admin: 01/21/19 08:40 Dose:  40 mg


Potassium Chloride (K-Dur 20 Meq Er Tab)  20 meq PO DAILY [facility]


   Last Admin: 01/21/19 08:31 Dose:  20 meq


Sitagliptin Phosphate (Januvia)  100 mg PO DAILY [facility]


   Last Admin: 01/21/19 08:30 Dose:  100 mg


Tiotropium Bromide (Spiriva)  18 mcg INH DAILY [facility]


   Last Admin: 01/21/19 08:30 Dose:  18 mcg


Trazodone HCl (Desyrel)  200 mg PO HS PRN


   PRN Reason: Insomnia


Zolpidem Tartrate (Ambien)  5 mg PO HS PRN


   PRN Reason: Insomnia


   Last Admin: 01/20/19 20:34 Dose:  5 mg











- Labs


Labs: 


                                        





                                 01/20/19 05:40 





                                 01/20/19 05:40 











- Extremities Exam


Additional comments: 


R knee: Dressings CDI


incision CDI


sensation intact SP/DP/TN


motor intact EHL/FHL/TA/G


pedal pulses intact


calves soft NT b/l

















Assessment and Plan


(1) Status post total right knee replacement


Assessment & Plan: 


POD#10 s/p R TKA


-PT/OT WBAT


-DVT ppx


-orthopedically stable for d/c home


-f/u in office 2 weeks postop (Fri/Mon)


-above d/w Dr. Snow in agreement


Status: Acute

## 2019-01-21 NOTE — CP.PCM.HP
History of Present Illness





- History of Present Illness


History of Present Illness: 





This is a 57 y/o female admitted for further PT and rehab after a right total 

knee for severe OA.


She has been suffering from chronic pain  which did not respond to all 

conservative measures including pain medications Phys therapy. Post op period 

was unremarkable. Hgb was stable. However patient suffers from anxiety.


She has a hx of DM 2 , hyperlipidemia, HTN  COPD, depression and anxiety.





Present on Admission





- Present on Admission


Any Indicators Present on Admission: No


History of DVT/PE: No


History of Uncontrolled Diabetes: No


Urinary Catheter: No


Decubitus Ulcer Present: No





Review of Systems





- Psychiatric


Psychiatric: Anxiety, Depression





Past Patient History





- Infectious Disease


Hx of Infectious Diseases: None





- Tetanus Immunizations


Tetanus Immunization: Unknown





- Past Medical History & Family History


Past Medical History?: Yes





- Past Social History


Smoking Status: Heavy Smoker > 10 Cigarettes Daily


Alcohol: None


Drugs: Denies


Home Situation {Lives}: Alone (elevator)





- CARDIAC


Hx Cardiac Disorders: Yes


Hx Angina: Yes


Hx Circulatory Problems: Yes (BLOOD CLOTS in right upper extremity)


Hx Hypercholesterolemia: Yes


Hx Hypertension: Yes


Other/Comment: hx of IVC filter 2013 right arm after DVT





- PULMONARY


Hx Respiratory Disorders: Yes


Hx Asthma: Yes


Hx Bronchitis: Yes


Hx Chronic Obstructive Pulmonary Disease (COPD): Yes


Hx Pneumonia: Yes


Hx Pulmonary Embolism: Yes


Other/Comment: WHEEZING





- NEUROLOGICAL


Hx Neurological Disorder: No





- HEENT


Hx HEENT Problems: Yes


Hx Glaucoma: Yes


Other/Comment: wears glasses





- RENAL


Hx Chronic Kidney Disease: No





- ENDOCRINE/METABOLIC


Hx Endocrine Disorders: Yes


Hx Diabetes Mellitus Type 2: Yes





- HEMATOLOGICAL/ONCOLOGICAL


Hx Blood Disorders: No


Hx Blood Transfusions: No


Other/Comment: DVT





- INTEGUMENTARY


Hx Dermatological Problems: No





- MUSCULOSKELETAL/RHEUMATOLOGICAL


Hx Falls: No


Hx Osteoarthritis: Yes





- GASTROINTESTINAL


Hx Gastrointestinal Disorders: No


Hx Ulcer: Yes


Other/Comment: HX GI BLEED





- GENITOURINARY/GYNECOLOGICAL


Hx Genitourinary Disorders: No


Hx Incontinence: Yes





- PSYCHIATRIC


Hx Substance Use: No





- SURGICAL HISTORY


Hx Surgeries: Yes


Hx Musculoskeletal Surgery: Yes (hx R ankle surger, L ft calluses sgy)


Hx Tonsillectomy: Yes


Hx Tubal Ligation: Yes


Other/Comment: LEFT FOOT SURGERY-3 YEARS.  LAPAROSCOPY





- ANESTHESIA


Hx Anesthesia: Yes


Hx Anesthesia Reactions: No


Hx Malignant Hyperthermia: No





Meds


Allergies/Adverse Reactions: 


                                    Allergies











Allergy/AdvReac Type Severity Reaction Status Date / Time


 


No Known Allergies Allergy   Verified 01/11/19 06:29














Physical Exam





- Head Exam


Head Exam: NORMAL INSPECTION





- Eye Exam


Eye Exam: Normal appearance





- ENT Exam


ENT Exam: Mucous Membranes Moist





- Respiratory Exam


Respiratory Exam: Clear to Auscultation Bilateral





- Cardiovascular Exam


Cardiovascular Exam: REGULAR RHYTHM





- GI/Abdominal Exam


GI & Abdominal Exam: Normal Bowel Sounds





- Neurological Exam


Neurological exam: CN II-XII Intact, Oriented x3





- Psychiatric Exam


Psychiatric exam: Anxious, Depressed





Results





- Vital Signs


Recent Vital Signs: 





                                Last Vital Signs











Temp  98.3 F   01/20/19 21:21


 


Pulse  66   01/20/19 21:21


 


Resp  20   01/20/19 21:21


 


BP  137/74   01/20/19 21:21


 


Pulse Ox  94 L  01/20/19 21:21














- Labs


Result Diagrams: 


                                 01/20/19 05:40





                                 01/20/19 05:40


Labs: 





                         Laboratory Results - last 24 hr











  01/20/19 01/20/19 01/20/19





  10:43 16:02 21:00


 


POC Glucose (mg/dL)  103  99  97














  01/20/19 01/21/19





  22:04 06:24


 


POC Glucose (mg/dL)  188 H  110














Assessment & Plan


(1) Gait difficulty


Status: Acute   





(2) Status post total right knee replacement


Status: Acute   





(3) Anxiety


Status: Acute   





(4) COPD (chronic obstructive pulmonary disease)


Status: Acute   





(5) Depression


Status: Acute   





(6) Diabetes mellitus type 2 in obese


Status: Acute   





(7) HTN (hypertension)


Status: Acute   Priority: Medium   





(8) Hyperlipidemia


Status: Acute   





- Assessment and Plan (Free Text)


Plan: 





Start PT


Cont all medications


Pain meds


 Low salt low fat diet

## 2019-02-11 ENCOUNTER — HOSPITAL ENCOUNTER (INPATIENT)
Dept: HOSPITAL 14 - H.ER | Age: 59
LOS: 3 days | Discharge: NURSING, REHAB OR LONG-TERM CARE | DRG: 857 | End: 2019-02-14
Attending: INTERNAL MEDICINE | Admitting: INTERNAL MEDICINE
Payer: MEDICARE

## 2019-02-11 VITALS — BODY MASS INDEX: 34.4 KG/M2

## 2019-02-11 DIAGNOSIS — E11.51: ICD-10-CM

## 2019-02-11 DIAGNOSIS — E66.01: ICD-10-CM

## 2019-02-11 DIAGNOSIS — B95.61: ICD-10-CM

## 2019-02-11 DIAGNOSIS — F32.9: ICD-10-CM

## 2019-02-11 DIAGNOSIS — Z96.651: ICD-10-CM

## 2019-02-11 DIAGNOSIS — Z86.711: ICD-10-CM

## 2019-02-11 DIAGNOSIS — J44.0: ICD-10-CM

## 2019-02-11 DIAGNOSIS — F41.9: ICD-10-CM

## 2019-02-11 DIAGNOSIS — E78.5: ICD-10-CM

## 2019-02-11 DIAGNOSIS — E78.00: ICD-10-CM

## 2019-02-11 DIAGNOSIS — M00.061: ICD-10-CM

## 2019-02-11 DIAGNOSIS — G47.00: ICD-10-CM

## 2019-02-11 DIAGNOSIS — I10: ICD-10-CM

## 2019-02-11 DIAGNOSIS — K59.00: ICD-10-CM

## 2019-02-11 DIAGNOSIS — J45.20: ICD-10-CM

## 2019-02-11 DIAGNOSIS — T81.41XA: Primary | ICD-10-CM

## 2019-02-11 DIAGNOSIS — T81.31XA: ICD-10-CM

## 2019-02-11 DIAGNOSIS — Z86.718: ICD-10-CM

## 2019-02-11 DIAGNOSIS — J20.9: ICD-10-CM

## 2019-02-11 DIAGNOSIS — M00.9: ICD-10-CM

## 2019-02-11 DIAGNOSIS — M06.9: ICD-10-CM

## 2019-02-11 DIAGNOSIS — F17.210: ICD-10-CM

## 2019-02-11 DIAGNOSIS — I25.10: ICD-10-CM

## 2019-02-11 DIAGNOSIS — G89.29: ICD-10-CM

## 2019-02-11 LAB
ALBUMIN SERPL-MCNC: 4.4 G/DL (ref 3.5–5)
ALBUMIN/GLOB SERPL: 1 {RATIO} (ref 1–2.1)
ALT SERPL-CCNC: 13 U/L (ref 9–52)
APTT BLD: 46.5 SECONDS (ref 25.6–37.1)
AST SERPL-CCNC: 38 U/L (ref 14–36)
BASOPHILS # BLD AUTO: 0 K/UL (ref 0–0.2)
BASOPHILS NFR BLD: 0.1 % (ref 0–2)
BUN SERPL-MCNC: 9 MG/DL (ref 7–17)
CALCIUM SERPL-MCNC: 9.6 MG/DL (ref 8.4–10.2)
EOSINOPHIL # BLD AUTO: 0.2 K/UL (ref 0–0.7)
EOSINOPHIL NFR BLD: 3 % (ref 0–4)
ERYTHROCYTE [DISTWIDTH] IN BLOOD BY AUTOMATED COUNT: 16 % (ref 11.5–14.5)
GFR NON-AFRICAN AMERICAN: > 60
HGB BLD-MCNC: 14.5 G/DL (ref 12–16)
INR PPP: 2.3
LYMPHOCYTES # BLD AUTO: 1.9 K/UL (ref 1–4.3)
LYMPHOCYTES NFR BLD AUTO: 32.6 % (ref 20–40)
MCH RBC QN AUTO: 30.3 PG (ref 27–31)
MCHC RBC AUTO-ENTMCNC: 32.4 G/DL (ref 33–37)
MCV RBC AUTO: 93.5 FL (ref 81–99)
MONOCYTES # BLD: 0.4 K/UL (ref 0–0.8)
MONOCYTES NFR BLD: 7.6 % (ref 0–10)
NEUTROPHILS # BLD: 3.3 K/UL (ref 1.8–7)
NEUTROPHILS NFR BLD AUTO: 56.7 % (ref 50–75)
NRBC BLD AUTO-RTO: 0.1 % (ref 0–0)
PLATELET # BLD: 182 K/UL (ref 130–400)
PMV BLD AUTO: 8.7 FL (ref 7.2–11.7)
PROTHROMBIN TIME: 25.7 SECONDS (ref 9.8–13.1)
RBC # BLD AUTO: 4.79 MIL/UL (ref 3.8–5.2)
WBC # BLD AUTO: 5.8 K/UL (ref 4.8–10.8)

## 2019-02-11 NOTE — CP.PCM.HP
<Keri Han - Last Filed: 02/11/19 23:03>





History of Present Illness





- History of Present Illness


History of Present Illness: 





58 yr old F with history of COPD, asthma, diabetes mellitus type 2, 

hypertension, hyperlipidemia, chronic pain presented to ED at suggestion of her 

orthopedic surgeon Dr. Snow. Pt had right TKR 1/14/19, followed by TCU stay 

until 1/21/19. About 4 days ago she states she noticed the scab/scar getting 

more red and started flaking off, then draining fluid. She saw her orthopedic 

surgeon today who suggested she come to ED. She does not have any fever, chest 

pain, shortness of breath, abdominal pain, issues toileting, leg pain.








PMD: Dr. Mittal


Specialists: Pulm Dr. Singh, Cardio Dr. Reinoso 





Past Med Hx: DVT of R arm, IVC filter, HTN, COPD, HLD, PVD, chronic back pain 

s/p multiple compression fractures of thoracic spine, prediabetes


Past Surg Hx: b/l foot surgery, left ankle fracture repaired, right foot 

fracture repair (screws in place), BTL


Family hx: stroke (mother), multiple myeloma (father), breast cancer (sister) 


Social Hx: current smoker, cut down to 1 cig/day for the past month, prior had 

42 pack years, no alcohol or drugs, lives alone, retired, has home health aide 5

days per week/2-3 hrs daily


Allergies: NKDA








In ED:


Vitals:  /80, HR 64, O2 sat 95%, RR 13, T 99F


Labs showed no leukocytosis, elevated K (hemolyzed), INR 2.3 (pt on Xarelto at 

home)


Received 2 mg IVP morphine and NS started at 100 ml/hr


Type and screen done


CXR done








Present on Admission





- Present on Admission


Any Indicators Present on Admission: Yes


History of DVT/PE: Yes





Review of Systems





- Constitutional


Constitutional: absent: Chills, Fever





- Cardiovascular


Cardiovascular: absent: Chest Pain





- Respiratory


Respiratory: absent: Cough, Dyspnea





- Gastrointestinal


Gastrointestinal: absent: Abdominal Pain





- Genitourinary


Genitourinary: absent: Dysuria





- Musculoskeletal


Additional comments: 





as per hpi





Past Patient History





- Infectious Disease


Hx of Infectious Diseases: None





- Tetanus Immunizations


Tetanus Immunization: Unknown





- Past Medical History & Family History


Past Medical History?: Yes





- Past Social History


Smoking Status: Light Smoker < 10 Cigarettes Daily


Alcohol: None


Drugs: Denies


Home Situation {Lives}: Alone





- CARDIAC


Hx Cardiac Disorders: Yes


Hx Hypercholesterolemia: Yes


Hx Hypertension: Yes





- PULMONARY


Hx Asthma: Yes


Hx Bronchitis: Yes


Hx Chronic Obstructive Pulmonary Disease (COPD): Yes


Hx Pneumonia: Yes


Hx Pulmonary Embolism: Yes





- NEUROLOGICAL


Hx Neurological Disorder: No





- HEENT


Hx Glaucoma: Yes





- RENAL


Hx Chronic Kidney Disease: No





- ENDOCRINE/METABOLIC


Hx Endocrine Disorders: Yes


Hx Diabetes Mellitus Type 2: Yes





- HEMATOLOGICAL/ONCOLOGICAL


Hx Human Immunodeficiency Virus (HIV): No





- INTEGUMENTARY


Hx Dermatological Problems: No





- MUSCULOSKELETAL/RHEUMATOLOGICAL


Hx Arthritis: Yes


Hx Fractures: Yes (R ankle fx)


Hx Rheumatoid Arthritis: Yes





- GASTROINTESTINAL


Hx Gastrointestinal Disorders: No


Hx Ulcer: Yes


Other/Comment: HX GI BLEED





- GENITOURINARY/GYNECOLOGICAL


Hx Genitourinary Disorders: No


Hx Incontinence: Yes





- PSYCHIATRIC


Hx Anxiety: Yes


Hx Depression: Yes





- SURGICAL HISTORY


Hx Tonsillectomy: Yes





- ANESTHESIA


Hx Anesthesia: Yes


Hx Anesthesia Reactions: No


Hx Malignant Hyperthermia: No





Meds


Allergies/Adverse Reactions: 


                                    Allergies











Allergy/AdvReac Type Severity Reaction Status Date / Time


 


No Known Allergies Allergy   Verified 01/11/19 06:29














Physical Exam





- Constitutional


Appears: No Acute Distress





- Head Exam


Head Exam: NORMAL INSPECTION





- Eye Exam


Eye Exam: Normal appearance





- ENT Exam


ENT Exam: Mucous Membranes Moist





- Respiratory Exam


Respiratory Exam: NORMAL BREATHING PATTERN.  absent: Wheezes, Respiratory 

Distress


Additional comments: 





scattered rhonchi





- Cardiovascular Exam


Cardiovascular Exam: REGULAR RHYTHM, +S1, +S2





- GI/Abdominal Exam


GI & Abdominal Exam: Normal Bowel Sounds, Soft.  absent: Tenderness





- Extremities Exam


Extremities exam: Negative for: calf tenderness, pedal edema


Additional comments: 





R knee - incision erythematous with minimal serosanguinous drainage





- Back Exam


Back exam: NORMAL INSPECTION





- Neurological Exam


Neurological exam: Alert, Oriented x3





- Skin


Skin Exam: Warm





Results





- Vital Signs


Recent Vital Signs: 





                                Last Vital Signs











Temp  99 F   02/11/19 18:52


 


Pulse  64   02/11/19 18:52


 


Resp  13   02/11/19 18:52


 


BP  168/80 H  02/11/19 18:52


 


Pulse Ox  95   02/11/19 19:04














- Labs


Result Diagrams: 


                                 02/11/19 19:21





                                 02/11/19 19:21


Labs: 





                         Laboratory Results - last 24 hr











  02/11/19 02/11/19 02/11/19





  19:21 19:21 19:21


 


WBC  5.8  


 


RBC  4.79  


 


Hgb  14.5  D  


 


Hct  44.8  


 


MCV  93.5  


 


MCH  30.3  


 


MCHC  32.4 L  


 


RDW  16.0 H  


 


Plt Count  182  


 


MPV  8.7  


 


Neut % (Auto)  56.7  


 


Lymph % (Auto)  32.6  


 


Mono % (Auto)  7.6  


 


Eos % (Auto)  3.0  


 


Baso % (Auto)  0.1  


 


Neut # (Auto)  3.3  


 


Lymph # (Auto)  1.9  


 


Mono # (Auto)  0.4  


 


Eos # (Auto)  0.2  


 


Baso # (Auto)  0.0  


 


PT    25.7 H


 


INR    2.3


 


APTT    46.5 H


 


Sodium   134 


 


Potassium   5.5 H 


 


Chloride   95 L 


 


Carbon Dioxide   25 


 


Anion Gap   20 


 


BUN   9 


 


Creatinine   0.8 


 


Est GFR ( Amer)   > 60 


 


Est GFR (Non-Af Amer)   > 60 


 


Random Glucose   106 H 


 


Calcium   9.6 


 


Total Bilirubin   0.8 


 


AST   38 H D 


 


ALT   13 


 


Alkaline Phosphatase   105 


 


Total Protein   8.7 H 


 


Albumin   4.4 


 


Globulin   4.3 H 


 


Albumin/Globulin Ratio   1.0 


 


Blood Type   


 


Antibody Screen   


 


BBK History Checked   














  02/11/19





  19:21


 


WBC 


 


RBC 


 


Hgb 


 


Hct 


 


MCV 


 


MCH 


 


MCHC 


 


RDW 


 


Plt Count 


 


MPV 


 


Neut % (Auto) 


 


Lymph % (Auto) 


 


Mono % (Auto) 


 


Eos % (Auto) 


 


Baso % (Auto) 


 


Neut # (Auto) 


 


Lymph # (Auto) 


 


Mono # (Auto) 


 


Eos # (Auto) 


 


Baso # (Auto) 


 


PT 


 


INR 


 


APTT 


 


Sodium 


 


Potassium 


 


Chloride 


 


Carbon Dioxide 


 


Anion Gap 


 


BUN 


 


Creatinine 


 


Est GFR ( Amer) 


 


Est GFR (Non-Af Amer) 


 


Random Glucose 


 


Calcium 


 


Total Bilirubin 


 


AST 


 


ALT 


 


Alkaline Phosphatase 


 


Total Protein 


 


Albumin 


 


Globulin 


 


Albumin/Globulin Ratio 


 


Blood Type  A POSITIVE


 


Antibody Screen  Negative


 


BBK History Checked  Patient has bt














Assessment & Plan





- Assessment and Plan (Free Text)


Assessment: 





58 yr old F with history of COPD, asthma, diabetes mellitus type 2, 

hypertension, hyperlipidemia, chronic pain, admitted for right knee wound 

infection.


Plan: 











Right Knee Wound


- Ortho consult - Dr. Snow, no abx yet, possible OR tomorrow


- Pain meds from home med rec


- Pulm Dr. Singh, cardio Dr. Costomiris consults for clearance; CXR and EKG 

done


- ID consult - Dr. King


- NPO after midnight; IVF overnight NS @ 100 ml/hr


- Blood cultures and wound drainage culture collected in ED


- CBC, BMP in am


- PT/OT eval











COPD/asthma


- Duoneb Q4 PRN


- Home med asmanex, spiriva








Diabetes mellitus type 2


- Hold januvia home med


- Insulin coverage scale & hypoglycemia protocol








Hypertension


- Procardia home med


- Monitor on vitals








Hyperlipidemia


- Home med atorvastatin








Chronic pain


- Home meds  percocet, oxycodone, gabapentin on med rec








Anxiety/Depression


- Home meds xanax, abilify, lexapro








Insomnia


- Home meds trazodone, ambien








Constipation


- Home meds dulcolax, colace 








Diet


- NPO after midnight








GI prophylaxis


- Protonix home med








DVT prophylaxis


- SCD for now, possible OR tomorrow








Patient seen/discussed with Dr. June.





<Gregoria June - Last Filed: 02/12/19 08:36>





Results





- Vital Signs


Recent Vital Signs: 





                                Last Vital Signs











Temp  98.2 F   02/12/19 08:23


 


Pulse  65   02/12/19 08:23


 


Resp  20   02/12/19 08:23


 


BP  121/80   02/12/19 08:23


 


Pulse Ox  93 L  02/12/19 08:23














- Labs


Result Diagrams: 


                                 02/12/19 05:40





                                 02/12/19 05:40


Labs: 





                         Laboratory Results - last 24 hr











  02/11/19 02/11/19 02/11/19





  19:21 19:21 19:21


 


WBC  5.8  


 


RBC  4.79  


 


Hgb  14.5  D  


 


Hct  44.8  


 


MCV  93.5  


 


MCH  30.3  


 


MCHC  32.4 L  


 


RDW  16.0 H  


 


Plt Count  182  


 


MPV  8.7  


 


Neut % (Auto)  56.7  


 


Lymph % (Auto)  32.6  


 


Mono % (Auto)  7.6  


 


Eos % (Auto)  3.0  


 


Baso % (Auto)  0.1  


 


Neut # (Auto)  3.3  


 


Lymph # (Auto)  1.9  


 


Mono # (Auto)  0.4  


 


Eos # (Auto)  0.2  


 


Baso # (Auto)  0.0  


 


PT    25.7 H


 


INR    2.3


 


APTT    46.5 H


 


Sodium   134 


 


Potassium   5.5 H 


 


Chloride   95 L 


 


Carbon Dioxide   25 


 


Anion Gap   20 


 


BUN   9 


 


Creatinine   0.8 


 


Est GFR ( Amer)   > 60 


 


Est GFR (Non-Af Amer)   > 60 


 


POC Glucose (mg/dL)   


 


Random Glucose   106 H 


 


Calcium   9.6 


 


Total Bilirubin   0.8 


 


AST   38 H D 


 


ALT   13 


 


Alkaline Phosphatase   105 


 


Total Protein   8.7 H 


 


Albumin   4.4 


 


Globulin   4.3 H 


 


Albumin/Globulin Ratio   1.0 


 


Urine Color   


 


Urine Clarity   


 


Urine pH   


 


Ur Specific Gravity   


 


Urine Protein   


 


Urine Glucose (UA)   


 


Urine Ketones   


 


Urine Blood   


 


Urine Nitrate   


 


Urine Bilirubin   


 


Urine Urobilinogen   


 


Ur Leukocyte Esterase   


 


Urine RBC (Auto)   


 


Urine Microscopic WBC   


 


Ur Squamous Epith Cells   


 


Urine Bacteria   


 


Blood Type   


 


Antibody Screen   


 


BBK History Checked   














  02/11/19 02/11/19 02/12/19





  19:21 22:22 04:45


 


WBC   


 


RBC   


 


Hgb   


 


Hct   


 


MCV   


 


MCH   


 


MCHC   


 


RDW   


 


Plt Count   


 


MPV   


 


Neut % (Auto)   


 


Lymph % (Auto)   


 


Mono % (Auto)   


 


Eos % (Auto)   


 


Baso % (Auto)   


 


Neut # (Auto)   


 


Lymph # (Auto)   


 


Mono # (Auto)   


 


Eos # (Auto)   


 


Baso # (Auto)   


 


PT   


 


INR   


 


APTT   


 


Sodium   


 


Potassium   


 


Chloride   


 


Carbon Dioxide   


 


Anion Gap   


 


BUN   


 


Creatinine   


 


Est GFR ( Amer)   


 


Est GFR (Non-Af Amer)   


 


POC Glucose (mg/dL)   98 


 


Random Glucose   


 


Calcium   


 


Total Bilirubin   


 


AST   


 


ALT   


 


Alkaline Phosphatase   


 


Total Protein   


 


Albumin   


 


Globulin   


 


Albumin/Globulin Ratio   


 


Urine Color    Yellow


 


Urine Clarity    Clear


 


Urine pH    6.0


 


Ur Specific Gravity    1.006


 


Urine Protein    Negative


 


Urine Glucose (UA)    Neg


 


Urine Ketones    Negative


 


Urine Blood    Negative


 


Urine Nitrate    Negative


 


Urine Bilirubin    Negative


 


Urine Urobilinogen    0.2-1.0


 


Ur Leukocyte Esterase    Neg


 


Urine RBC (Auto)    < 1


 


Urine Microscopic WBC    < 1


 


Ur Squamous Epith Cells    1


 


Urine Bacteria    Rare


 


Blood Type  A POSITIVE  


 


Antibody Screen  Negative  


 


BBK History Checked  Patient has bt  














  02/12/19 02/12/19 02/12/19





  05:40 05:40 05:40


 


WBC  4.9  


 


RBC  4.31  


 


Hgb  13.0  


 


Hct  39.8  


 


MCV  92.4  


 


MCH  30.1  


 


MCHC  32.6 L  


 


RDW  16.2 H  


 


Plt Count  178  


 


MPV  9.1  


 


Neut % (Auto)  54.8  


 


Lymph % (Auto)  31.0  


 


Mono % (Auto)  9.4  


 


Eos % (Auto)  3.7  


 


Baso % (Auto)  1.1  


 


Neut # (Auto)  2.7  


 


Lymph # (Auto)  1.5  


 


Mono # (Auto)  0.5  


 


Eos # (Auto)  0.2  


 


Baso # (Auto)  0.1  


 


PT   


 


INR   


 


APTT   


 


Sodium   137 


 


Potassium   3.9 


 


Chloride   101 


 


Carbon Dioxide   25 


 


Anion Gap   15 


 


BUN   7 


 


Creatinine   0.7 


 


Est GFR ( Amer)   > 60 


 


Est GFR (Non-Af Amer)   > 60 


 


POC Glucose (mg/dL)    121 H


 


Random Glucose   100 


 


Calcium   9.6 


 


Total Bilirubin   


 


AST   


 


ALT   


 


Alkaline Phosphatase   


 


Total Protein   


 


Albumin   


 


Globulin   


 


Albumin/Globulin Ratio   


 


Urine Color   


 


Urine Clarity   


 


Urine pH   


 


Ur Specific Gravity   


 


Urine Protein   


 


Urine Glucose (UA)   


 


Urine Ketones   


 


Urine Blood   


 


Urine Nitrate   


 


Urine Bilirubin   


 


Urine Urobilinogen   


 


Ur Leukocyte Esterase   


 


Urine RBC (Auto)   


 


Urine Microscopic WBC   


 


Ur Squamous Epith Cells   


 


Urine Bacteria   


 


Blood Type   


 


Antibody Screen   


 


BBK History Checked   














Attending/Attestation





- Attestation


I have personally seen and examined this patient.: Yes


I have fully participated in the care of the patient.: Yes


I have reviewed all pertinent clinical information: Yes


Notes (Text): 








Right Knee Drainage , Erythema and Pain ? Infection , History of recent TKR ( 

Jan 11 )


COPD, chronic


Mild Intermittent Asthma 


HTN


DM Type II


Coagulopath - INR prolonged





- Ortho consulted - poss surgery today 


- NPO


- Hold off antibiotics for now as per Ortho - will start after cultures are 

obtained in OR


-cont home meds


- Pulm and cardio for optimization


-ID consult


- rpt PT/INR , pt is not on any anticoag at home

## 2019-02-11 NOTE — ED PDOC
Lower Extremity Pain/Injury


Time Seen by Provider: 02/11/19 18:44


Chief Complaint (Nursing): Lower Extremity Problem/Injury


Chief Complaint (Provider): pain redness R knee


History Per: Patient, Other (surgeon)


Onset/Duration Of Symptoms: Days (4-5), Gradual


Current Symptoms Are (Timing): Still Present


Severity: Moderate


Additional History Per: Prior Records


Additional Complaint(s): 





57yo female one month post op R TKR presents c/o redness pain and slight 

discharge R knee ongoing for up about a week. Seen by Dr Gwendolyn epstein

in ED.








Past Medical History


Vital Signs: 





                                Last Vital Signs











Temp  99 F   02/11/19 18:52


 


Pulse  64   02/11/19 18:52


 


Resp  13   02/11/19 18:52


 


BP  168/80 H  02/11/19 18:52


 


Pulse Ox  95   02/11/19 18:52














- Medical History


PMH: Anxiety, Arthritis, Asthma, Back Problems (Chronic back pain), Bronchitis, 

CAD, COPD, Depression, Diabetes, Deep Vein Thrombosis, Fractures (R ankle fx), 

HTN, Hypercholesterolemia, Pneumonia, Pulmonary Embolism, Rheumatoid Arthritis


   Denies: HIV, Chronic Kidney Disease





- Surgical History


Surgical History: Endoscopy, Tonsillectomy


Other surgeries: R TKR





- Family History


Family History: States: Stroke





- Immunization History


Hx Tetanus Toxoid Vaccination: No


Hx Influenza Vaccination: Yes


Hx Pneumococcal Vaccination: No





- Home Medications


Home Medications: 


                                Ambulatory Orders











 Medication  Instructions  Recorded


 


Folic Acid 1 mg PO DAILY #0 tab 05/25/15


 


Potassium Chloride [K-Dur 20 mEq 20 meq PO DAILY 03/30/16





ER Tab]  


 


traZODone [Desyrel] 200 mg PO HS 03/30/16


 


Topiramate [Topamax] 100 mg PO BID 12/18/16


 


Zolpidem Tartrate [Ambien] 10 mg PO HS 12/18/16


 


oxyCODONE [oxyCODONE Immediate 30 mg PO QID PRN 12/18/16





Release Tab]  


 


Rivaroxaban [Xarelto] 20 mg PO DAILY  tab 12/20/16


 


Gabapentin [Neurontin] 400 mg PO TID 05/10/17


 


Atorvastatin [Lipitor] 40 mg PO DAILY 02/11/18


 


Escitalopram [Lexapro] 20 mg PO DAILY 02/11/18


 


SITagliptin [Januvia] 100 mg PO DAILY 12/14/18


 


ALPRAZolam [Xanax] 1 mg PO BID 01/11/19


 


Albuterol Sulfate [Proair Hfa] 8.5 gm IH PRN PRN 01/11/19


 


Aripiprazole [Aripiprazole Odt] 15 mg PO DAILY 01/11/19


 


Cholecalciferol (Vitamin D3) 1,000 unit PO DAILY 01/11/19





[Vitamin D3]  


 


Meloxicam [Mobic] 7.5 mg PO DAILY 01/11/19


 


Mometasone Furoate [Asmanex Hfa] 100 mcg IH DAILY 01/11/19


 


NIFEdipine ER [Procardia XL] 90 mg PO DAILY 01/11/19


 


Omeprazole/Sodium Bicarbonate 1 each PO DAILY 01/11/19





[Omeprazole-Bicarb 40-1,100 Cap]  


 


Oxycodone HCl/Acetaminophen 1 tab PO TID 01/11/19





[Endocet  mg Tablet]  


 


Celecoxib [celeBREX] 200 mg PO DAILY  cap 01/14/19


 


Docusate [Colace] 100 mg PO BID  cap 01/14/19


 


ALPRAZolam [Xanax] 1 mg PO BID PRN  tab 01/21/19


 


ARIPiprazole [Abilify] 15 mg PO DAILY  tab 01/21/19


 


Acetaminophen [Tylenol 325mg tab] 650 mg PO Q4 PRN  tab 01/21/19


 


Acetaminophen [Tylenol 325mg tab] 650 mg PO Q4 PRN  tab 01/21/19


 


Albuterol HFA [Ventolin HFA 90 1 puff INH RQ6 PRN  inhaler 01/21/19





mcg/actuation (8 g)]  


 


Albuterol/Ipratropium [Duoneb 3 3 ml INH RQID  neb 01/21/19





mg/0.5 mg (3 ml) UD]  


 


Atorvastatin [Lipitor] 40 mg PO HS  tab 01/21/19


 


Bisacodyl [Dulcolax] 10 mg PO DAILY PRN  ect 01/21/19


 


Celecoxib [celeBREX] 200 mg PO DAILY  cap 01/21/19


 


Cholecalciferol [Vitamin D 1000 IU] 2,000 intlu PO DAILY  tab 01/21/19


 


Docusate [Colace] 100 mg PO BID  cap 01/21/19


 


Escitalopram [Lexapro] 20 mg PO DAILY  tab 01/21/19


 


Folic Acid 1 mg PO DAILY  tab 01/21/19


 


Gabapentin [Neurontin] 400 mg PO TID  cap 01/21/19


 


NIFEdipine ER [Procardia XL] 90 mg PO DAILY  ter 01/21/19


 


Nicotine 21 mg/24 hr [Nicoderm Cq] 1 patch TD DAILY  patch 01/21/19


 


Ondansetron ODT [Zofran ODT] 4 mg PO Q6 PRN  odt 01/21/19


 


Pantoprazole [Protonix EC Tab] 40 mg PO DAILY  ect 01/21/19


 


Potassium Chloride [K-Dur 20 mEq 20 meq PO DAILY  tab 01/21/19





ER Tab]  


 


SITagliptin [Januvia] 100 mg PO DAILY  tab 01/21/19


 


Tiotropium [Spiriva] 18 mcg INH DAILY  cap 01/21/19


 


Zolpidem [Ambien] 5 mg PO HS PRN  tab 01/21/19


 


traZODone [Desyrel] 200 mg PO HS PRN  tab 01/21/19














- Allergies


Allergies/Adverse Reactions: 


                                    Allergies











Allergy/AdvReac Type Severity Reaction Status Date / Time


 


No Known Allergies Allergy   Verified 01/11/19 06:29














Review of Systems


Constitutional: Positive for: Chills.  Negative for: Fever


Cardiovascular: Negative for: Chest Pain


Respiratory: Negative for: Cough


Gastrointestinal: Negative for: Abdominal Pain


Genitourinary Female: Negative for: Frequency


Musculoskeletal: Positive for: Back Pain, Leg Pain.  Negative for: Arm Pain


Skin: Negative for: Rash, Lesions


Neurological: Negative for: Weakness, Numbness, Headache


Psych: Negative for: Suicidal ideation





Physical Exam





- Reviewed


Nursing Documentation Reviewed: Yes


Vital Signs Reviewed: Yes





- Physical Exam


Appears: Positive for: Well, Non-toxic


Head Exam: Positive for: ATRAUMATIC


Skin: Positive for: Normal Color, Warm, Dry


Respiratory: Negative for: Respiratory Distress


Extremity: Positive for: Swelling, Other (R knee incision erythema and 

induration)





- ECG


O2 Sat by Pulse Oximetry: 95





Medical Decision Making


Medical Decision Making: 





workup for R knee post op infection initiated


admit hospitalist for PMD Dr Kyrie Snow informed








Disposition





- Clinical Impression


Clinical Impression: 


 Postoperative wound infection








- Patient ED Disposition


Is Patient to be Admitted: Yes





- Disposition


Disposition Time: 19:04


Condition: FAIR


Forms:  [facility] (English)





- Pt Status Changed To:


Hospital Disposition Of: Inpatient





- Admit Certification


Admit to Inpatient:: After my assessment, the patient will require 

hospitalization for at least two midnights.  This is because of the severity of 

symptoms shown, intensity of services needed, and/or the medical risk in this 

patient being treated as an outpatient.





- POA


Present On Arrival: Surgical Site Infection

## 2019-02-11 NOTE — RAD
Date of service: 



02/11/2019



HISTORY:

 SOB 



COMPARISON:

Frontal chest radiograph 01/14/2019. 



FINDINGS:



LUNGS:

No consolidation bilaterally.  Fibrosis again noted left base.



PLEURA:

No significant pleural effusion identified, no pneumothorax apparent.



CARDIOVASCULAR:

No aortic atherosclerotic calcification present.



Normal cardiac size. No pulmonary vascular congestion. 



OSSEOUS STRUCTURES:

No significant abnormalities.



VISUALIZED UPPER ABDOMEN:

Normal.



OTHER FINDINGS:

None.



IMPRESSION:

No interval acute cardiopulmonary disease appreciated.

## 2019-02-12 LAB
APTT BLD: 33 SECONDS (ref 25.6–37.1)
BACTERIA #/AREA URNS HPF: (no result) /[HPF]
BASOPHILS # BLD AUTO: 0.1 K/UL (ref 0–0.2)
BASOPHILS NFR BLD: 1.1 % (ref 0–2)
BILIRUB UR-MCNC: NEGATIVE MG/DL
BODY FLD TYPE: (no result)
BUN SERPL-MCNC: 7 MG/DL (ref 7–17)
CALCIUM SERPL-MCNC: 9.6 MG/DL (ref 8.4–10.2)
COLOR UR: YELLOW
EOSINOPHIL # BLD AUTO: 0.2 K/UL (ref 0–0.7)
EOSINOPHIL NFR BLD: 3.7 % (ref 0–4)
ERYTHROCYTE [DISTWIDTH] IN BLOOD BY AUTOMATED COUNT: 16.2 % (ref 11.5–14.5)
GFR NON-AFRICAN AMERICAN: > 60
GLUCOSE UR STRIP-MCNC: (no result) MG/DL
HGB BLD-MCNC: 13 G/DL (ref 12–16)
INR PPP: 1.3
LEUKOCYTE ESTERASE UR-ACNC: (no result) LEU/UL
LYMPHOCYTES # BLD AUTO: 1.5 K/UL (ref 1–4.3)
LYMPHOCYTES NFR BLD AUTO: 31 % (ref 20–40)
MCH RBC QN AUTO: 30.1 PG (ref 27–31)
MCHC RBC AUTO-ENTMCNC: 32.6 G/DL (ref 33–37)
MCV RBC AUTO: 92.4 FL (ref 81–99)
MONOCYTES # BLD: 0.5 K/UL (ref 0–0.8)
MONOCYTES NFR BLD: 9.4 % (ref 0–10)
NEUTROPHILS # BLD: 2.7 K/UL (ref 1.8–7)
NEUTROPHILS NFR BLD AUTO: 54.8 % (ref 50–75)
NRBC BLD AUTO-RTO: 0.1 % (ref 0–0)
PH UR STRIP: 6 [PH] (ref 5–8)
PLATELET # BLD: 178 K/UL (ref 130–400)
PMV BLD AUTO: 9.1 FL (ref 7.2–11.7)
PROT UR STRIP-MCNC: NEGATIVE MG/DL
PROTHROMBIN TIME: 14.7 SECONDS (ref 9.8–13.1)
RBC # BLD AUTO: 4.31 MIL/UL (ref 3.8–5.2)
RBC # UR STRIP: NEGATIVE /UL
SF GROSS APPEARANCE: (no result)
SP GR UR STRIP: 1.01 (ref 1–1.03)
SQUAMOUS EPITHIAL: 1 /HPF (ref 0–5)
SYNOVIAL FLUID COMMENT: (no result)
SYNOVIAL FLUID MONO/MACROPHAGE: 27 % (ref 0–0)
URINE CLARITY: CLEAR
UROBILINOGEN UR-MCNC: (no result) MG/DL (ref 0.2–1)
WBC # BLD AUTO: 4.9 K/UL (ref 4.8–10.8)

## 2019-02-12 PROCEDURE — 0JBN0ZZ EXCISION OF RIGHT LOWER LEG SUBCUTANEOUS TISSUE AND FASCIA, OPEN APPROACH: ICD-10-PCS | Performed by: PLASTIC SURGERY

## 2019-02-12 PROCEDURE — 0S9C3ZX DRAINAGE OF RIGHT KNEE JOINT, PERCUTANEOUS APPROACH, DIAGNOSTIC: ICD-10-PCS | Performed by: SPECIALIST

## 2019-02-12 PROCEDURE — 0JQN0ZZ REPAIR RIGHT LOWER LEG SUBCUTANEOUS TISSUE AND FASCIA, OPEN APPROACH: ICD-10-PCS | Performed by: PLASTIC SURGERY

## 2019-02-12 RX ADMIN — OXYCODONE HYDROCHLORIDE SCH: 10 TABLET ORAL at 10:16

## 2019-02-12 RX ADMIN — VITAMIN D, TAB 1000IU (100/BT) SCH: 25 TAB at 10:39

## 2019-02-12 RX ADMIN — INSULIN LISPRO SCH: 100 INJECTION, SOLUTION INTRAVENOUS; SUBCUTANEOUS at 21:23

## 2019-02-12 RX ADMIN — INSULIN LISPRO SCH: 100 INJECTION, SOLUTION INTRAVENOUS; SUBCUTANEOUS at 09:51

## 2019-02-12 RX ADMIN — NIFEDIPINE SCH: 90 TABLET, FILM COATED, EXTENDED RELEASE ORAL at 10:38

## 2019-02-12 RX ADMIN — HYDROMORPHONE HYDROCHLORIDE PRN MG: 1 INJECTION, SOLUTION INTRAMUSCULAR; INTRAVENOUS; SUBCUTANEOUS at 18:25

## 2019-02-12 RX ADMIN — PANTOPRAZOLE SODIUM SCH: 40 TABLET, DELAYED RELEASE ORAL at 10:38

## 2019-02-12 RX ADMIN — TIOTROPIUM BROMIDE SCH MCG: 18 CAPSULE ORAL; RESPIRATORY (INHALATION) at 11:13

## 2019-02-12 RX ADMIN — ARIPIPRAZOLE SCH: 1 SOLUTION ORAL at 10:12

## 2019-02-12 RX ADMIN — HYDROMORPHONE HYDROCHLORIDE PRN MG: 1 INJECTION, SOLUTION INTRAMUSCULAR; INTRAVENOUS; SUBCUTANEOUS at 18:40

## 2019-02-12 RX ADMIN — INSULIN LISPRO SCH: 100 INJECTION, SOLUTION INTRAVENOUS; SUBCUTANEOUS at 17:28

## 2019-02-12 RX ADMIN — INSULIN LISPRO SCH: 100 INJECTION, SOLUTION INTRAVENOUS; SUBCUTANEOUS at 14:02

## 2019-02-12 RX ADMIN — HYDROMORPHONE HYDROCHLORIDE PRN MG: 1 INJECTION, SOLUTION INTRAMUSCULAR; INTRAVENOUS; SUBCUTANEOUS at 18:20

## 2019-02-12 RX ADMIN — HYDROMORPHONE HYDROCHLORIDE PRN MG: 1 INJECTION, SOLUTION INTRAMUSCULAR; INTRAVENOUS; SUBCUTANEOUS at 18:10

## 2019-02-12 RX ADMIN — MOMETASONE FUROATE SCH PUFF: 110 INHALANT RESPIRATORY (INHALATION) at 10:14

## 2019-02-12 NOTE — CP.PCM.CON
History of Present Illness





- History of Present Illness


History of Present Illness: 





Infectious Disease Consultation Note-





 asked to see this patient at the request of  for infected right knee.





HPI-


Patient is a 58 year old female with PMH of DM II, COPD, Tobacco use, HTN, HLD 

s/p right total knee replacement a month ago who is now admitted for redness and

discharge from the surgical site.


Patient explain she was doing well until 5 days ago when she noticed a scab that

had formed over the right knee surgical site had come off and she had developed 

yellowish discharge from the region .


She denies any fever but states had some chills.


She states her daughter came over and cleaned the wound with saline and it 

looked ok but then agian the next day she had discharge along with increased 

redness in the region.


She saw her orhto  who advised her to be admitted for culture and Iv 

antibiotics.


she also c/o pain in the region.


Patient had aspiration of the joint fluid earlier today by ortho PA and as per 

PA did not look purulent and gram stain so far negative as per micro.








Review of Systems





- Review of Systems


Review of Systems: 





ROS-


denies any Fever, + chills, denies any HA, denies any cough or sob, denies any 

chest pain, denies any nausea or vomiting, denies any abd. pain, denies any 

dysurea, denies any diarrhea


Right knee surgical site + rednes and swelling and yellow discharge for 4-5 

days.





Past Patient History





- Infectious Disease


Hx of Infectious Diseases: None





- Tetanus Immunizations


Tetanus Immunization: Unknown





- Past Medical History & Family History


Past Medical History?: Yes





- Past Social History


Smoking Status: Light Smoker < 10 Cigarettes Daily


Home Situation {Lives}: Alone





- CARDIAC


Hx Cardiac Disorders: Yes


Hx Hypercholesterolemia: Yes


Hx Hypertension: Yes





- PULMONARY


Hx Respiratory Disorders: Yes


Hx Asthma: Yes


Hx Bronchitis: Yes


Hx Chronic Obstructive Pulmonary Disease (COPD): No (denies hx)


Hx Pneumonia: No (denies hx)


Hx Pulmonary Embolism: No (denies hx)





- NEUROLOGICAL


Hx Neurological Disorder: No





- HEENT


Hx HEENT Problems: Yes


Hx Glaucoma: Yes


Other/Comment: eyeglasses





- RENAL


Hx Chronic Kidney Disease: No





- ENDOCRINE/METABOLIC


Hx Endocrine Disorders: Yes


Hx Diabetes Mellitus Type 2: Yes





- HEMATOLOGICAL/ONCOLOGICAL


Hx Blood Disorders: No





- INTEGUMENTARY


Hx Dermatological Problems: No





- MUSCULOSKELETAL/RHEUMATOLOGICAL


Hx Musculoskeletal Disorders: Yes


Hx Arthritis: Yes


Hx Back Pain: Yes


Hx Falls: No


Hx Herniated Disk: Yes





- GASTROINTESTINAL


Hx Gastrointestinal Disorders: No


Hx Ulcer: No (denies hx)





- GENITOURINARY/GYNECOLOGICAL


Hx Genitourinary Disorders: No





- PSYCHIATRIC


Hx Psychophysiologic Disorder: Yes


Hx Anxiety: Yes


Hx Depression: Yes


Hx Substance Use: No





- SURGICAL HISTORY


Hx Surgeries: Yes


Hx Musculoskeletal Surgery: Yes (R TKR 1/14/19)


Hx Tonsillectomy: Yes


Hx Tubal Ligation: Yes


Other/Comment: IVC filter after DVT to R arm





- ANESTHESIA


Hx Anesthesia: Yes


Hx Anesthesia Reactions: No


Hx Malignant Hyperthermia: No





Meds


Allergies/Adverse Reactions: 


                                    Allergies











Allergy/AdvReac Type Severity Reaction Status Date / Time


 


No Known Allergies Allergy   Verified 01/11/19 06:29














- Medications


Medications: 


                               Current Medications





Acetaminophen (Tylenol 325mg Tab)  650 mg PO Q4 PRN


   PRN Reason: Fever >100.4 F


Acetaminophen (Tylenol 325mg Tab)  650 mg PO Q4 PRN


   PRN Reason: Pain, Mild (1-3)


Albuterol/Ipratropium (Duoneb 3 Mg/0.5 Mg (3 Ml) Ud)  3 ml INH RQ4 PRN


   PRN Reason: Shortness of Breath


Alprazolam (Xanax)  1 mg PO BID PRN


   PRN Reason: Anxiety


Aripiprazole (Abilify)  15 mg PO DAILY [facility]


   Last Admin: 02/12/19 10:12 Dose:  Not Given


Atorvastatin Calcium (Lipitor)  40 mg PO HS [facility]


   Last Admin: 02/11/19 23:28 Dose:  40 mg


Bisacodyl (Dulcolax)  10 mg PO DAILY PRN


   PRN Reason: Constipation


Cholecalciferol (Vitamin D)  2,000 intlu PO DAILY [facility]


   Last Admin: 02/12/19 10:39 Dose:  Not Given


Docusate Sodium (Colace)  100 mg PO BID [facility]


   Last Admin: 02/12/19 10:13 Dose:  Not Given


Escitalopram Oxalate (Lexapro)  20 mg PO DAILY [facility]


   Last Admin: 02/12/19 10:38 Dose:  Not Given


Folic Acid (Folic Acid)  1 mg PO DAILY [facility]


   Last Admin: 02/12/19 10:37 Dose:  Not Given


Gabapentin (Neurontin)  400 mg PO TID [facility]


   Last Admin: 02/12/19 10:16 Dose:  Not Given


Vancomycin HCl 500 mg/ Sodium (Chloride)  100 mls @ 100 mls/hr IVPB Q12H [facility]; 

Protocol


Sodium Chloride (Sodium Chloride 0.9%)  1,000 mls @ 75 mls/hr IV .[id number] [facility]


   Stop: 02/13/19 08:18


   Last Admin: 02/12/19 10:17 Dose:  Not Given


Cefepime HCl 1 gm/ Sodium (Chloride)  100 mls @ 100 mls/hr IVPB Q8 [facility]; Protocol


   Last Admin: 02/12/19 09:58 Dose:  100 mls/hr


Vancomycin HCl 1 gm/ Sodium (Chloride)  250 mls @ 166.667 mls/hr IVPB Q12 [facility]; 

Protocol


   Last Admin: 02/12/19 11:12 Dose:  166.667 mls/hr


Insulin Human Lispro (Humalog)  0 units SC ACHS [facility]; Protocol


   Last Admin: 02/12/19 09:51 Dose:  Not Given


Mometasone Furoate (Asmanex Twisthaler 110 Mcg)  1 puff INH DAILY [facility]


   Last Admin: 02/12/19 10:14 Dose:  1 puff


Morphine Sulfate (Morphine)  2 mg IVP Q4 PRN


   PRN Reason: Pain, moderate (4-7)


   Last Admin: 02/12/19 11:24 Dose:  2 mg


Nicotine (Nicoderm Cq)  1 patch TD DAILY [facility]


   Last Admin: 02/12/19 10:14 Dose:  1 patch


Nifedipine (Procardia Xl)  90 mg PO DAILY [facility]


   Last Admin: 02/12/19 10:38 Dose:  Not Given


Oxycodone HCl (Oxycodone Immediate Release Tab)  30 mg PO QAM [facility]


   Last Admin: 02/12/19 10:16 Dose:  Not Given


Oxycodone/Acetaminophen (Percocet 5/325 Mg Tab)  2 tab PO QPM [facility]


   Stop: 02/15/19 18:01


Pantoprazole Sodium (Protonix Ec Tab)  40 mg PO DAILY [facility]


   Last Admin: 02/12/19 10:38 Dose:  Not Given


Tiotropium Bromide (Spiriva)  18 mcg INH DAILY [facility]


   Last Admin: 02/12/19 11:13 Dose:  18 mcg


Topiramate (Topamax)  100 mg PO BID [facility]


   Last Admin: 02/12/19 10:39 Dose:  Not Given


Trazodone HCl (Desyrel)  200 mg PO HS PRN


   PRN Reason: Insomnia


Zolpidem Tartrate (Ambien)  5 mg PO HS PRN


   PRN Reason: Insomnia











Physical Exam





- Constitutional


Appears: No Acute Distress





- Head Exam


Head Exam: ATRAUMATIC





- Eye Exam


Eye Exam: EOMI, PERRL





- ENT Exam


ENT Exam: Normal Oropharynx





- Neck Exam


Neck exam: Positive for: Full Rom





- Respiratory Exam


Respiratory Exam: Clear to Auscultation Bilateral, NORMAL BREATHING PATTERN





- Cardiovascular Exam


Cardiovascular Exam: RRR, +S1, +S2





- GI/Abdominal Exam


GI & Abdominal Exam: Normal Bowel Sounds, Soft


Additional comments: 





NT, ND





- Extremities Exam


Additional comments: 





Right anterior knee region with surgical site line intact but has areas of scabs

on top and surrounding erythema, + edema, mild tenderness to touch


no gross discharge at this time 


no malodor





- Neurological Exam


Neurological exam: Alert, Oriented x3





Results





- Vital Signs


Recent Vital Signs: 


                                Last Vital Signs











Temp  98.2 F   02/12/19 08:23


 


Pulse  65   02/12/19 08:23


 


Resp  20   02/12/19 08:23


 


BP  121/80   02/12/19 08:23


 


Pulse Ox  93 L  02/12/19 08:23














- Labs


Result Diagrams: 


                                 02/12/19 05:40





                                 02/12/19 05:40


Labs: 


                         Laboratory Results - last 24 hr











  02/11/19 02/11/19 02/11/19





  19:21 19:21 19:21


 


WBC  5.8  


 


RBC  4.79  


 


Hgb  14.5  D  


 


Hct  44.8  


 


MCV  93.5  


 


MCH  30.3  


 


MCHC  32.4 L  


 


RDW  16.0 H  


 


Plt Count  182  


 


MPV  8.7  


 


Neut % (Auto)  56.7  


 


Lymph % (Auto)  32.6  


 


Mono % (Auto)  7.6  


 


Eos % (Auto)  3.0  


 


Baso % (Auto)  0.1  


 


Neut # (Auto)  3.3  


 


Lymph # (Auto)  1.9  


 


Mono # (Auto)  0.4  


 


Eos # (Auto)  0.2  


 


Baso # (Auto)  0.0  


 


PT    25.7 H


 


INR    2.3


 


APTT    46.5 H


 


Sodium   134 


 


Potassium   5.5 H 


 


Chloride   95 L 


 


Carbon Dioxide   25 


 


Anion Gap   20 


 


BUN   9 


 


Creatinine   0.8 


 


Est GFR ( Amer)   > 60 


 


Est GFR (Non-Af Amer)   > 60 


 


POC Glucose (mg/dL)   


 


Random Glucose   106 H 


 


Calcium   9.6 


 


Total Bilirubin   0.8 


 


AST   38 H D 


 


ALT   13 


 


Alkaline Phosphatase   105 


 


Total Protein   8.7 H 


 


Albumin   4.4 


 


Globulin   4.3 H 


 


Albumin/Globulin Ratio   1.0 


 


Urine Color   


 


Urine Clarity   


 


Urine pH   


 


Ur Specific Gravity   


 


Urine Protein   


 


Urine Glucose (UA)   


 


Urine Ketones   


 


Urine Blood   


 


Urine Nitrate   


 


Urine Bilirubin   


 


Urine Urobilinogen   


 


Ur Leukocyte Esterase   


 


Urine RBC (Auto)   


 


Urine Microscopic WBC   


 


Ur Squamous Epith Cells   


 


Urine Bacteria   


 


Fluid Type   


 


Synovial WBC   


 


Synovial RBC   


 


Synovial Neutrophils   


 


Synovial Lymphocytes   


 


Synov Monos/Macrophage   


 


Synovial Fluid Comment   


 


Blood Type   


 


Antibody Screen   


 


BBK History Checked   














  02/11/19 02/11/19 02/12/19





  19:21 22:22 04:45


 


WBC   


 


RBC   


 


Hgb   


 


Hct   


 


MCV   


 


MCH   


 


MCHC   


 


RDW   


 


Plt Count   


 


MPV   


 


Neut % (Auto)   


 


Lymph % (Auto)   


 


Mono % (Auto)   


 


Eos % (Auto)   


 


Baso % (Auto)   


 


Neut # (Auto)   


 


Lymph # (Auto)   


 


Mono # (Auto)   


 


Eos # (Auto)   


 


Baso # (Auto)   


 


PT   


 


INR   


 


APTT   


 


Sodium   


 


Potassium   


 


Chloride   


 


Carbon Dioxide   


 


Anion Gap   


 


BUN   


 


Creatinine   


 


Est GFR ( Amer)   


 


Est GFR (Non-Af Amer)   


 


POC Glucose (mg/dL)   98 


 


Random Glucose   


 


Calcium   


 


Total Bilirubin   


 


AST   


 


ALT   


 


Alkaline Phosphatase   


 


Total Protein   


 


Albumin   


 


Globulin   


 


Albumin/Globulin Ratio   


 


Urine Color    Yellow


 


Urine Clarity    Clear


 


Urine pH    6.0


 


Ur Specific Gravity    1.006


 


Urine Protein    Negative


 


Urine Glucose (UA)    Neg


 


Urine Ketones    Negative


 


Urine Blood    Negative


 


Urine Nitrate    Negative


 


Urine Bilirubin    Negative


 


Urine Urobilinogen    0.2-1.0


 


Ur Leukocyte Esterase    Neg


 


Urine RBC (Auto)    < 1


 


Urine Microscopic WBC    < 1


 


Ur Squamous Epith Cells    1


 


Urine Bacteria    Rare


 


Fluid Type   


 


Synovial WBC   


 


Synovial RBC   


 


Synovial Neutrophils   


 


Synovial Lymphocytes   


 


Synov Monos/Macrophage   


 


Synovial Fluid Comment   


 


Blood Type  A POSITIVE  


 


Antibody Screen  Negative  


 


BBK History Checked  Patient has bt  














  02/12/19 02/12/19 02/12/19





  05:40 05:40 05:40


 


WBC  4.9  


 


RBC  4.31  


 


Hgb  13.0  


 


Hct  39.8  


 


MCV  92.4  


 


MCH  30.1  


 


MCHC  32.6 L  


 


RDW  16.2 H  


 


Plt Count  178  


 


MPV  9.1  


 


Neut % (Auto)  54.8  


 


Lymph % (Auto)  31.0  


 


Mono % (Auto)  9.4  


 


Eos % (Auto)  3.7  


 


Baso % (Auto)  1.1  


 


Neut # (Auto)  2.7  


 


Lymph # (Auto)  1.5  


 


Mono # (Auto)  0.5  


 


Eos # (Auto)  0.2  


 


Baso # (Auto)  0.1  


 


PT   


 


INR   


 


APTT   


 


Sodium   137 


 


Potassium   3.9 


 


Chloride   101 


 


Carbon Dioxide   25 


 


Anion Gap   15 


 


BUN   7 


 


Creatinine   0.7 


 


Est GFR ( Amer)   > 60 


 


Est GFR (Non-Af Amer)   > 60 


 


POC Glucose (mg/dL)    121 H


 


Random Glucose   100 


 


Calcium   9.6 


 


Total Bilirubin   


 


AST   


 


ALT   


 


Alkaline Phosphatase   


 


Total Protein   


 


Albumin   


 


Globulin   


 


Albumin/Globulin Ratio   


 


Urine Color   


 


Urine Clarity   


 


Urine pH   


 


Ur Specific Gravity   


 


Urine Protein   


 


Urine Glucose (UA)   


 


Urine Ketones   


 


Urine Blood   


 


Urine Nitrate   


 


Urine Bilirubin   


 


Urine Urobilinogen   


 


Ur Leukocyte Esterase   


 


Urine RBC (Auto)   


 


Urine Microscopic WBC   


 


Ur Squamous Epith Cells   


 


Urine Bacteria   


 


Fluid Type   


 


Synovial WBC   


 


Synovial RBC   


 


Synovial Neutrophils   


 


Synovial Lymphocytes   


 


Synov Monos/Macrophage   


 


Synovial Fluid Comment   


 


Blood Type   


 


Antibody Screen   


 


BBK History Checked   














  02/12/19 02/12/19 02/12/19





  09:05 10:04 11:14


 


WBC   


 


RBC   


 


Hgb   


 


Hct   


 


MCV   


 


MCH   


 


MCHC   


 


RDW   


 


Plt Count   


 


MPV   


 


Neut % (Auto)   


 


Lymph % (Auto)   


 


Mono % (Auto)   


 


Eos % (Auto)   


 


Baso % (Auto)   


 


Neut # (Auto)   


 


Lymph # (Auto)   


 


Mono # (Auto)   


 


Eos # (Auto)   


 


Baso # (Auto)   


 


PT   14.7 H D 


 


INR   1.3 


 


APTT   33.0 


 


Sodium   


 


Potassium   


 


Chloride   


 


Carbon Dioxide   


 


Anion Gap   


 


BUN   


 


Creatinine   


 


Est GFR ( Amer)   


 


Est GFR (Non-Af Amer)   


 


POC Glucose (mg/dL)    94


 


Random Glucose   


 


Calcium   


 


Total Bilirubin   


 


AST   


 


ALT   


 


Alkaline Phosphatase   


 


Total Protein   


 


Albumin   


 


Globulin   


 


Albumin/Globulin Ratio   


 


Urine Color   


 


Urine Clarity   


 


Urine pH   


 


Ur Specific Gravity   


 


Urine Protein   


 


Urine Glucose (UA)   


 


Urine Ketones   


 


Urine Blood   


 


Urine Nitrate   


 


Urine Bilirubin   


 


Urine Urobilinogen   


 


Ur Leukocyte Esterase   


 


Urine RBC (Auto)   


 


Urine Microscopic WBC   


 


Ur Squamous Epith Cells   


 


Urine Bacteria   


 


Fluid Type  Synovial fluid  


 


Synovial WBC  164.0 H  


 


Synovial RBC  28356.0 H  


 


Synovial Neutrophils  34.0 H  


 


Synovial Lymphocytes  39.0 H  


 


Synov Monos/Macrophage  27 H  


 


Synovial Fluid Comment  Moderately bloody  


 


Blood Type   


 


Antibody Screen   


 


BBK History Checked   








                         Laboratory Results - last 72 hr











  02/11/19 02/11/19 02/11/19





  19:21 19:21 19:21


 


WBC  5.8  


 


RBC  4.79  


 


Hgb  14.5  D  


 


Hct  44.8  


 


MCV  93.5  


 


MCH  30.3  


 


MCHC  32.4 L  


 


RDW  16.0 H  


 


Plt Count  182  


 


MPV  8.7  


 


Neut % (Auto)  56.7  


 


Lymph % (Auto)  32.6  


 


Mono % (Auto)  7.6  


 


Eos % (Auto)  3.0  


 


Baso % (Auto)  0.1  


 


Neut # (Auto)  3.3  


 


Lymph # (Auto)  1.9  


 


Mono # (Auto)  0.4  


 


Eos # (Auto)  0.2  


 


Baso # (Auto)  0.0  


 


PT    25.7 H


 


INR    2.3


 


APTT    46.5 H


 


Sodium   134 


 


Potassium   5.5 H 


 


Chloride   95 L 


 


Carbon Dioxide   25 


 


Anion Gap   20 


 


BUN   9 


 


Creatinine   0.8 


 


Est GFR ( Amer)   > 60 


 


Est GFR (Non-Af Amer)   > 60 


 


POC Glucose (mg/dL)   


 


Random Glucose   106 H 


 


Calcium   9.6 


 


Total Bilirubin   0.8 


 


AST   38 H D 


 


ALT   13 


 


Alkaline Phosphatase   105 


 


Total Protein   8.7 H 


 


Albumin   4.4 


 


Globulin   4.3 H 


 


Albumin/Globulin Ratio   1.0 


 


Urine Color   


 


Urine Clarity   


 


Urine pH   


 


Ur Specific Gravity   


 


Urine Protein   


 


Urine Glucose (UA)   


 


Urine Ketones   


 


Urine Blood   


 


Urine Nitrate   


 


Urine Bilirubin   


 


Urine Urobilinogen   


 


Ur Leukocyte Esterase   


 


Urine RBC (Auto)   


 


Urine Microscopic WBC   


 


Ur Squamous Epith Cells   


 


Urine Bacteria   


 


Fluid Type   


 


Synovial WBC   


 


Synovial RBC   


 


Synovial Neutrophils   


 


Synovial Lymphocytes   


 


Synov Monos/Macrophage   


 


Synovial Fluid Comment   


 


Blood Type   


 


Antibody Screen   


 


BBK History Checked   














  02/11/19 02/11/19 02/12/19





  19:21 22:22 04:45


 


WBC   


 


RBC   


 


Hgb   


 


Hct   


 


MCV   


 


MCH   


 


MCHC   


 


RDW   


 


Plt Count   


 


MPV   


 


Neut % (Auto)   


 


Lymph % (Auto)   


 


Mono % (Auto)   


 


Eos % (Auto)   


 


Baso % (Auto)   


 


Neut # (Auto)   


 


Lymph # (Auto)   


 


Mono # (Auto)   


 


Eos # (Auto)   


 


Baso # (Auto)   


 


PT   


 


INR   


 


APTT   


 


Sodium   


 


Potassium   


 


Chloride   


 


Carbon Dioxide   


 


Anion Gap   


 


BUN   


 


Creatinine   


 


Est GFR ( Amer)   


 


Est GFR (Non-Af Amer)   


 


POC Glucose (mg/dL)   98 


 


Random Glucose   


 


Calcium   


 


Total Bilirubin   


 


AST   


 


ALT   


 


Alkaline Phosphatase   


 


Total Protein   


 


Albumin   


 


Globulin   


 


Albumin/Globulin Ratio   


 


Urine Color    Yellow


 


Urine Clarity    Clear


 


Urine pH    6.0


 


Ur Specific Gravity    1.006


 


Urine Protein    Negative


 


Urine Glucose (UA)    Neg


 


Urine Ketones    Negative


 


Urine Blood    Negative


 


Urine Nitrate    Negative


 


Urine Bilirubin    Negative


 


Urine Urobilinogen    0.2-1.0


 


Ur Leukocyte Esterase    Neg


 


Urine RBC (Auto)    < 1


 


Urine Microscopic WBC    < 1


 


Ur Squamous Epith Cells    1


 


Urine Bacteria    Rare


 


Fluid Type   


 


Synovial WBC   


 


Synovial RBC   


 


Synovial Neutrophils   


 


Synovial Lymphocytes   


 


Synov Monos/Macrophage   


 


Synovial Fluid Comment   


 


Blood Type  A POSITIVE  


 


Antibody Screen  Negative  


 


BBK History Checked  Patient has bt  














  02/12/19 02/12/19 02/12/19





  05:40 05:40 05:40


 


WBC  4.9  


 


RBC  4.31  


 


Hgb  13.0  


 


Hct  39.8  


 


MCV  92.4  


 


MCH  30.1  


 


MCHC  32.6 L  


 


RDW  16.2 H  


 


Plt Count  178  


 


MPV  9.1  


 


Neut % (Auto)  54.8  


 


Lymph % (Auto)  31.0  


 


Mono % (Auto)  9.4  


 


Eos % (Auto)  3.7  


 


Baso % (Auto)  1.1  


 


Neut # (Auto)  2.7  


 


Lymph # (Auto)  1.5  


 


Mono # (Auto)  0.5  


 


Eos # (Auto)  0.2  


 


Baso # (Auto)  0.1  


 


PT   


 


INR   


 


APTT   


 


Sodium   137 


 


Potassium   3.9 


 


Chloride   101 


 


Carbon Dioxide   25 


 


Anion Gap   15 


 


BUN   7 


 


Creatinine   0.7 


 


Est GFR ( Amer)   > 60 


 


Est GFR (Non-Af Amer)   > 60 


 


POC Glucose (mg/dL)    121 H


 


Random Glucose   100 


 


Calcium   9.6 


 


Total Bilirubin   


 


AST   


 


ALT   


 


Alkaline Phosphatase   


 


Total Protein   


 


Albumin   


 


Globulin   


 


Albumin/Globulin Ratio   


 


Urine Color   


 


Urine Clarity   


 


Urine pH   


 


Ur Specific Gravity   


 


Urine Protein   


 


Urine Glucose (UA)   


 


Urine Ketones   


 


Urine Blood   


 


Urine Nitrate   


 


Urine Bilirubin   


 


Urine Urobilinogen   


 


Ur Leukocyte Esterase   


 


Urine RBC (Auto)   


 


Urine Microscopic WBC   


 


Ur Squamous Epith Cells   


 


Urine Bacteria   


 


Fluid Type   


 


Synovial WBC   


 


Synovial RBC   


 


Synovial Neutrophils   


 


Synovial Lymphocytes   


 


Synov Monos/Macrophage   


 


Synovial Fluid Comment   


 


Blood Type   


 


Antibody Screen   


 


BBK History Checked   














  02/12/19 02/12/19 02/12/19





  09:05 10:04 11:14


 


WBC   


 


RBC   


 


Hgb   


 


Hct   


 


MCV   


 


MCH   


 


MCHC   


 


RDW   


 


Plt Count   


 


MPV   


 


Neut % (Auto)   


 


Lymph % (Auto)   


 


Mono % (Auto)   


 


Eos % (Auto)   


 


Baso % (Auto)   


 


Neut # (Auto)   


 


Lymph # (Auto)   


 


Mono # (Auto)   


 


Eos # (Auto)   


 


Baso # (Auto)   


 


PT   14.7 H D 


 


INR   1.3 


 


APTT   33.0 


 


Sodium   


 


Potassium   


 


Chloride   


 


Carbon Dioxide   


 


Anion Gap   


 


BUN   


 


Creatinine   


 


Est GFR ( Amer)   


 


Est GFR (Non-Af Amer)   


 


POC Glucose (mg/dL)    94


 


Random Glucose   


 


Calcium   


 


Total Bilirubin   


 


AST   


 


ALT   


 


Alkaline Phosphatase   


 


Total Protein   


 


Albumin   


 


Globulin   


 


Albumin/Globulin Ratio   


 


Urine Color   


 


Urine Clarity   


 


Urine pH   


 


Ur Specific Gravity   


 


Urine Protein   


 


Urine Glucose (UA)   


 


Urine Ketones   


 


Urine Blood   


 


Urine Nitrate   


 


Urine Bilirubin   


 


Urine Urobilinogen   


 


Ur Leukocyte Esterase   


 


Urine RBC (Auto)   


 


Urine Microscopic WBC   


 


Ur Squamous Epith Cells   


 


Urine Bacteria   


 


Fluid Type  Synovial fluid  


 


Synovial WBC  164.0 H  


 


Synovial RBC  79697.0 H  


 


Synovial Neutrophils  34.0 H  


 


Synovial Lymphocytes  39.0 H  


 


Synov Monos/Macrophage  27 H  


 


Synovial Fluid Comment  Moderately bloody  


 


Blood Type   


 


Antibody Screen   


 


BBK History Checked   








                                  Microbiology





02/12/19 09:00   Synovial Fluid   Gram Stain - Final


02/11/19 19:56   Knee - Right   Gram Stain - Final





                                        





   











Assessment & Plan


(1) Postoperative wound infection


Status: Acute   





(2) COPD (chronic obstructive pulmonary disease)


Status: Acute   





(3) Diabetes mellitus type 2 in obese


Status: Acute   





- Assessment and Plan (Free Text)


Assessment: 





A/P-


58 year old female with multiple medical conditions including DM II, HTN, COPD 

admitted with right knee wound site infection post TKR a month ago.





afebrile


normal wbc count


synovial fluid cell count not c/w septic joint


await synovial fluid cx result.





Plan-


advsie to continue with IV vancomycin an cefepime started by hospitalist day #1 

pending further results.


keep vanco trough <15.


f/u synovial fluid cx.


if no surgical intervention by ortho then would advise at least 4 weeks of IV 

abx to avoid  prosthetic joint infection.





All labs and imaging and chart notes reviewd.





All above d/w patient and she verbalizes full understanding of all above and a

grees with above plan of care.





Thank you for allowing me to take part in the care of this patient.

## 2019-02-12 NOTE — CARD
--------------- APPROVED REPORT --------------





Date of service: 02/11/2019



EKG Measurement

Heart [id number]

ID [id number]

[id number]

[id number]

[id number]



<Conclusion>

Sinus rhythm with premature atrial complexes with aberrant conduction

Nonspecific T wave abnormality

Prolonged QT

Abnormal ECG

## 2019-02-12 NOTE — CP.PCM.PN
<Lorri Daley - Last Filed: 02/12/19 10:16>





Subjective





- Date & Time of Evaluation


Date of Evaluation: 02/12/19


Time of Evaluation: 09:45





- Subjective


Subjective: 





Patient seen at bedside this AM. Resting comfortably and in NAD. Patient 

afebrile and hemodynamically stable.  She states that she had fluid drained from

her knee by ortho this morning.  Denies nausea/vomiting/fever/shortness of 

breath/chest pain.   





Objective





- Vital Signs/Intake and Output


Vital Signs (last 24 hours): 


                                        











Temp Pulse Resp BP Pulse Ox


 


 98.2 F   65   20   121/80   93 L


 


 02/12/19 08:23  02/12/19 08:23  02/12/19 08:23  02/12/19 08:23  02/12/19 08:23











- Medications


Medications: 


                               Current Medications





Acetaminophen (Tylenol 325mg Tab)  650 mg PO Q4 PRN


   PRN Reason: Fever >100.4 F


Acetaminophen (Tylenol 325mg Tab)  650 mg PO Q4 PRN


   PRN Reason: Pain, Mild (1-3)


Albuterol/Ipratropium (Duoneb 3 Mg/0.5 Mg (3 Ml) Ud)  3 ml INH RQ4 PRN


   PRN Reason: Shortness of Breath


Alprazolam (Xanax)  1 mg PO BID PRN


   PRN Reason: Anxiety


Aripiprazole (Abilify)  15 mg PO DAILY [facility]


Atorvastatin Calcium (Lipitor)  40 mg PO HS [facility]


   Last Admin: 02/11/19 23:28 Dose:  40 mg


Bisacodyl (Dulcolax)  10 mg PO DAILY PRN


   PRN Reason: Constipation


Cholecalciferol (Vitamin D)  2,000 intlu PO DAILY [facility]


Docusate Sodium (Colace)  100 mg PO BID [facility]


Escitalopram Oxalate (Lexapro)  20 mg PO DAILY [facility]


Folic Acid (Folic Acid)  1 mg PO DAILY [facility]


Gabapentin (Neurontin)  400 mg PO TID [facility]


Vancomycin HCl 500 mg/ Sodium (Chloride)  100 mls @ 100 mls/hr IVPB Q12H [facility]; 

Protocol


Sodium Chloride (Sodium Chloride 0.9%)  1,000 mls @ 75 mls/hr IV .[id number] [facility]


   Stop: 02/13/19 08:18


Cefepime HCl 1 gm/ Sodium (Chloride)  100 mls @ 100 mls/hr IVPB Q8 [facility]; Protocol


Vancomycin HCl 1 gm/ Sodium (Chloride)  250 mls @ 166.667 mls/hr IVPB Q12 [facility]; 

Protocol


Insulin Human Lispro (Humalog)  0 units SC ACHS [facility]; Protocol


Mometasone Furoate (Asmanex Twisthaler 110 Mcg)  1 puff INH DAILY [facility]


Morphine Sulfate (Morphine)  2 mg IVP Q4 PRN


   PRN Reason: Pain, moderate (4-7)


   Last Admin: 02/12/19 06:01 Dose:  2 mg


Nicotine (Nicoderm Cq)  1 patch TD DAILY [facility]


Nifedipine (Procardia Xl)  90 mg PO DAILY [facility]


Oxycodone HCl (Oxycodone Immediate Release Tab)  30 mg PO QAM [facility]


Oxycodone/Acetaminophen (Percocet 5/325 Mg Tab)  2 tab PO QPM [facility]


   Stop: 02/15/19 18:01


Pantoprazole Sodium (Protonix Ec Tab)  40 mg PO DAILY [facility]


Tiotropium Bromide (Spiriva)  18 mcg INH DAILY [facility]


Topiramate (Topamax)  100 mg PO BID [facility]


Trazodone HCl (Desyrel)  200 mg PO HS PRN


   PRN Reason: Insomnia


Zolpidem Tartrate (Ambien)  5 mg PO HS PRN


   PRN Reason: Insomnia











- Labs


Labs: 


                                        





                                 02/12/19 05:40 





                                 02/12/19 05:40 





                                        











PT  25.7 Seconds (9.8-13.1)  H  02/11/19  19:21    


 


INR  2.3   02/11/19  19:21    


 


APTT  46.5 Seconds (25.6-37.1)  H  02/11/19  19:21    














- Constitutional


Appears: Non-toxic, No Acute Distress





- Head Exam


Head Exam: ATRAUMATIC, NORMOCEPHALIC





- Eye Exam


Eye Exam: Normal appearance


Pupil Exam: NORMAL ACCOMODATION





- Respiratory Exam


Respiratory Exam: Clear to Ausculation Bilateral, NORMAL BREATHING PATTERN





- Cardiovascular Exam


Cardiovascular Exam: REGULAR RHYTHM





- GI/Abdominal Exam


GI & Abdominal Exam: Soft, Normal Bowel Sounds





- Extremities Exam


Additional comments: 





Right knee dressing clean/dry/intact 


No strike through





- Neurological Exam


Neurological Exam: Alert, Awake, Oriented x3





Assessment and Plan





- Assessment and Plan (Free Text)


Assessment: 





58 year old female with history of COPD, asthma, diabetes mellitus type 2, 

hypertension, hyperlipidemia, chronic pain, admitted for right knee wound 

infection.


Plan: 





1. Right Knee Wound


- Ortho consult - Dr. Snow


- Pulm Dr. Singh, cardio Dr. Reinoso consults for clearance; CXR and EKG 

done


- ID consult - Dr. King


- Patient started on Cefepime 1gm Q8, Vanco 1gm Q12 


- NPO after midnight; IVF overnight NS @ 100 ml/hr- keep NPO


- F/u Blood cultures and wound culture


- R Knee aspiration taken this morning, sent to pathology 


- Possible plan for OR today, revisional TKR, pending aspiration results


- PT/OT eval





2. COPD/asthma


- Duoneb Q4 PRN


- Home med asmanex, spiriva





3. Diabetes mellitus type 2


- Hold januvia home med


- Insulin coverage scale & hypoglycemia protocol





4. Hypertension


- Procardia home med


- Monitor on vitals





5. Hyperlipidemia


- Home med atorvastatin





6. Chronic pain


- Home meds  percocet, oxycodone, gabapentin on med rec





7. Anxiety/Depression


- Home meds xanax, abilify, lexapro





8. Insomnia


- Home meds trazodone, ambien





9. Constipation


- Home meds dulcolax, colace 





10. Diet


- NPO after midnight





11. GI prophylaxis


- Protonix home med





12. DVT prophylaxis


- SCD for now








<Gregoria June - Last Filed: 02/12/19 16:43>





Objective





- Vital Signs/Intake and Output


Vital Signs (last 24 hours): 


                                        











Temp Pulse Resp BP Pulse Ox


 


 97.8 F   74   20   148/84   95 


 


 02/12/19 16:21  02/12/19 16:21  02/12/19 16:21  02/12/19 16:21  02/12/19 16:21











- Medications


Medications: 


                               Current Medications





Acetaminophen (Tylenol 325mg Tab)  650 mg PO Q4 PRN


   PRN Reason: Fever >100.4 F


Acetaminophen (Tylenol 325mg Tab)  650 mg PO Q4 PRN


   PRN Reason: Pain, Mild (1-3)


Albuterol/Ipratropium (Duoneb 3 Mg/0.5 Mg (3 Ml) Ud)  3 ml INH RQ4 PRN


   PRN Reason: Shortness of Breath


Alprazolam (Xanax)  1 mg PO BID PRN


   PRN Reason: Anxiety


Aripiprazole (Abilify)  15 mg PO DAILY [facility]


   Last Admin: 02/12/19 10:12 Dose:  Not Given


Atorvastatin Calcium (Lipitor)  40 mg PO HS [facility]


   Last Admin: 02/11/19 23:28 Dose:  40 mg


Bisacodyl (Dulcolax)  10 mg PO DAILY PRN


   PRN Reason: Constipation


Cholecalciferol (Vitamin D)  2,000 intlu PO DAILY [facility]


   Last Admin: 02/12/19 10:39 Dose:  Not Given


Docusate Sodium (Colace)  100 mg PO BID [facility]


   Last Admin: 02/12/19 10:13 Dose:  Not Given


Escitalopram Oxalate (Lexapro)  20 mg PO DAILY [facility]


   Last Admin: 02/12/19 10:38 Dose:  Not Given


Folic Acid (Folic Acid)  1 mg PO DAILY [facility]


   Last Admin: 02/12/19 10:37 Dose:  Not Given


Gabapentin (Neurontin)  400 mg PO TID [facility]


   Last Admin: 02/12/19 14:02 Dose:  Not Given


Sodium Chloride (Sodium Chloride 0.9%)  1,000 mls @ 75 mls/hr IV .[id number] [facility]


   Stop: 02/13/19 08:18


   Last Admin: 02/12/19 10:17 Dose:  Not Given


Cefepime HCl 1 gm/ Sodium (Chloride)  100 mls @ 100 mls/hr IVPB Q8 [facility]; Protocol


   Last Admin: 02/12/19 09:58 Dose:  100 mls/hr


Vancomycin HCl 1 gm/ Sodium (Chloride)  250 mls @ 166.667 mls/hr IVPB Q12 [facility]; 

Protocol


   Last Admin: 02/12/19 11:12 Dose:  166.667 mls/hr


Insulin Human Lispro (Humalog)  0 units SC ACHS [facility]; Protocol


   Last Admin: 02/12/19 14:02 Dose:  Not Given


Mometasone Furoate (Asmanex Twisthaler 110 Mcg)  1 puff INH DAILY [facility]


   Last Admin: 02/12/19 10:14 Dose:  1 puff


Morphine Sulfate (Morphine)  2 mg IVP Q4 PRN


   PRN Reason: Pain, moderate (4-7)


   Last Admin: 02/12/19 11:24 Dose:  2 mg


Nicotine (Nicoderm Cq)  1 patch TD DAILY [facility]


   Last Admin: 02/12/19 10:14 Dose:  1 patch


Nifedipine (Procardia Xl)  90 mg PO DAILY [facility]


   Last Admin: 02/12/19 10:38 Dose:  Not Given


Oxycodone HCl (Oxycodone Immediate Release Tab)  30 mg PO QAM [facility]


   Last Admin: 02/12/19 10:16 Dose:  Not Given


Oxycodone/Acetaminophen (Percocet 5/325 Mg Tab)  2 tab PO QPM [facility]


   Stop: 02/15/19 18:01


Pantoprazole Sodium (Protonix Ec Tab)  40 mg PO DAILY [facility]


   Last Admin: 02/12/19 10:38 Dose:  Not Given


Tiotropium Bromide (Spiriva)  18 mcg INH DAILY [facility]


   Last Admin: 02/12/19 11:13 Dose:  18 mcg


Topiramate (Topamax)  100 mg PO BID [facility]


   Last Admin: 02/12/19 10:39 Dose:  Not Given


Trazodone HCl (Desyrel)  200 mg PO HS PRN


   PRN Reason: Insomnia


Zolpidem Tartrate (Ambien)  5 mg PO HS PRN


   PRN Reason: Insomnia











- Labs


Labs: 


                                        





                                 02/12/19 05:40 





                                 02/12/19 05:40 





                                        











PT  14.7 Seconds (9.8-13.1)  H D 02/12/19  10:04    


 


INR  1.3   02/12/19  10:04    


 


APTT  33.0 Seconds (25.6-37.1)   02/12/19  10:04    














Attending/Attestation





- Attestation


I have personally seen and examined this patient.: Yes


I have fully participated in the care of the patient.: Yes


I have reviewed all pertinent clinical information, including history, physical 

exam and plan: Yes


Notes (Text): 





02/12/19 16:38


Right Knee Drainage , Erythema and Pain ? Infection , History of recent TKR ( 

Jan 11 )


COPD, chronic


Mild Intermittent Asthma 


HTN


DM Type II


Coagulopathy, resolved prob lab error








- Aspiration of knee joint done at bedside - Aspirate sent for Gram stain - Gram

stain : no organism x 4 samples


- Ortho consulted - poss surgery today for superficial Washout 


- NPO


- start Pt on IV cefepime and Vanco empirically


-cont home meds


- Pulm and cardio consulted for optimization prior to surgery


-ID consult


- DVT proph after surgery

## 2019-02-12 NOTE — CP.PCM.CON
History of Present Illness





- History of Present Illness


History of Present Illness: 





58 YR OLD FEMALE REFERRED FOR PULMONARY EVALUATION FOLLOWING ADMISSION FOR R 

KNEE SEPTIC ARTHRITIS FOLLOWING RECENT R TOTAL KNEE REPLACEMENT.SHE DENIES CHEST

PAINS OR SHORTNESS OF BREATH.


HX OF COPD--CONTINUES TO SMOKE CIGARETTES,HYPERTENSION,HYPERLIPIDEMIA,TYPE 2 

DIABETES,S/P BILATERAL FOOT AND ANKLE SURGERY





Past Patient History





- Infectious Disease


Hx of Infectious Diseases: None





- Tetanus Immunizations


Tetanus Immunization: Unknown





- Past Medical History & Family History


Past Medical History?: Yes





- Past Social History


Smoking Status: Light Smoker < 10 Cigarettes Daily





- CARDIAC


Hx Cardiac Disorders: Yes


Hx Hypercholesterolemia: Yes


Hx Hypertension: Yes





- PULMONARY


Hx Respiratory Disorders: Yes


Hx Asthma: Yes


Hx Bronchitis: Yes


Hx Chronic Obstructive Pulmonary Disease (COPD): No (denies hx)


Hx Pneumonia: No (denies hx)


Hx Pulmonary Embolism: No (denies hx)





- NEUROLOGICAL


Hx Neurological Disorder: No





- HEENT


Hx HEENT Problems: Yes


Hx Glaucoma: Yes


Other/Comment: eyeglasses





- RENAL


Hx Chronic Kidney Disease: No





- ENDOCRINE/METABOLIC


Hx Endocrine Disorders: Yes


Hx Diabetes Mellitus Type 2: Yes





- HEMATOLOGICAL/ONCOLOGICAL


Hx Blood Disorders: No


Hx AIDS: No


Hx Human Immunodeficiency Virus (HIV): No





- INTEGUMENTARY


Hx Dermatological Problems: No





- MUSCULOSKELETAL/RHEUMATOLOGICAL


Hx Musculoskeletal Disorders: Yes


Hx Arthritis: Yes


Hx Back Pain: Yes


Hx Falls: No


Hx Herniated Disk: Yes





- GASTROINTESTINAL


Hx Gastrointestinal Disorders: No


Hx Ulcer: No (denies hx)





- GENITOURINARY/GYNECOLOGICAL


Hx Genitourinary Disorders: No





- PSYCHIATRIC


Hx Psychophysiologic Disorder: Yes


Hx Anxiety: Yes


Hx Depression: Yes


Hx Substance Use: No





- SURGICAL HISTORY


Hx Surgeries: Yes


Hx Musculoskeletal Surgery: Yes (R TKR 1/14/19)


Hx Tonsillectomy: Yes


Hx Tubal Ligation: Yes


Other/Comment: IVC filter after DVT to R arm





- ANESTHESIA


Hx Anesthesia: Yes


Hx Anesthesia Reactions: No


Hx Malignant Hyperthermia: No





Meds


Allergies/Adverse Reactions: 


                                    Allergies











Allergy/AdvReac Type Severity Reaction Status Date / Time


 


No Known Allergies Allergy   Verified 01/11/19 06:29














- Medications


Medications: 


                               Current Medications





Acetaminophen (Tylenol 325mg Tab)  650 mg PO Q4 PRN


   PRN Reason: Fever >100.4 F


Acetaminophen (Tylenol 325mg Tab)  650 mg PO Q4 PRN


   PRN Reason: Pain, Mild (1-3)


Albuterol/Ipratropium (Duoneb 3 Mg/0.5 Mg (3 Ml) Ud)  3 ml INH RQ4 PRN


   PRN Reason: Shortness of Breath


Alprazolam (Xanax)  1 mg PO BID PRN


   PRN Reason: Anxiety


Aripiprazole (Abilify)  15 mg PO DAILY [facility]


Atorvastatin Calcium (Lipitor)  40 mg PO HS [facility]


   Last Admin: 02/11/19 23:28 Dose:  40 mg


Bisacodyl (Dulcolax)  10 mg PO DAILY PRN


   PRN Reason: Constipation


Cholecalciferol (Vitamin D)  2,000 intlu PO DAILY [facility]


Docusate Sodium (Colace)  100 mg PO BID [facility]


Escitalopram Oxalate (Lexapro)  20 mg PO DAILY [facility]


Folic Acid (Folic Acid)  1 mg PO DAILY [facility]


Gabapentin (Neurontin)  400 mg PO TID [facility]


Insulin Human Lispro (Humalog)  0 units SC ACHS [facility]; Protocol


Lidocaine HCl (Lidocaine 1% (20ml))  20 ml IJ ONCE STA


   Stop: 02/12/19 07:54


Mometasone Furoate (Asmanex Twisthaler 110 Mcg)  1 puff INH DAILY [facility]


Morphine Sulfate (Morphine)  2 mg IVP Q4 PRN


   PRN Reason: Pain, moderate (4-7)


   Last Admin: 02/12/19 06:01 Dose:  2 mg


Nifedipine (Procardia Xl)  90 mg PO DAILY [facility]


Oxycodone HCl (Oxycodone Immediate Release Tab)  30 mg PO QAM [facility]


Oxycodone/Acetaminophen (Percocet 5/325 Mg Tab)  2 tab PO QPM [facility]


   Stop: 02/15/19 18:01


Pantoprazole Sodium (Protonix Ec Tab)  40 mg PO DAILY [facility]


Pneumococcal Polyvalent Vaccine (Pneumovax 23 Vaccine)  0.5 ml IM .ONCE ONE


   Stop: 02/12/19 09:01


Tiotropium Bromide (Spiriva)  18 mcg INH DAILY [facility]


Topiramate (Topamax)  100 mg PO BID [facility]


Trazodone HCl (Desyrel)  200 mg PO HS PRN


   PRN Reason: Insomnia


Zolpidem Tartrate (Ambien)  5 mg PO HS PRN


   PRN Reason: Insomnia











Physical Exam





- Constitutional


Appears: Well, In Acute Distress





- Head Exam


Head Exam: ATRAUMATIC, NORMAL INSPECTION, NORMOCEPHALIC





- Eye Exam


Eye Exam: EOMI, Normal appearance, PERRL


Pupil Exam: NORMAL ACCOMODATION, PERRL





- ENT Exam


ENT Exam: Mucous Membranes Moist, Normal Exam





- Neck Exam


Neck exam: Positive for: Normal Inspection





- Respiratory Exam


Respiratory Exam: Clear to Auscultation Bilateral, NORMAL BREATHING PATTERN





- Cardiovascular Exam


Cardiovascular Exam: REGULAR RHYTHM





- GI/Abdominal Exam


GI & Abdominal Exam: Normal Bowel Sounds, Soft.  absent: Tenderness





- Rectal Exam


Rectal Exam: NORMAL INSPECTION





- Extremities Exam


Extremities exam: Positive for: normal inspection





- Back Exam


Back exam: tenderness


Additional comments: 





WARM R KNEE WITH REDNESS AND FLUCTUANCE





- Neurological Exam


Neurological exam: Abnormal Gait, Alert, CN II-XII Intact, Oriented x3, Reflexes

Normal





- Psychiatric Exam


Psychiatric exam: Normal Affect, Normal Mood





- Skin


Skin Exam: Dry, Intact, Normal Color, Warm





Results





- Vital Signs


Recent Vital Signs: 


                                Last Vital Signs











Temp  98.8 F   02/12/19 01:25


 


Pulse  64   02/12/19 01:25


 


Resp  20   02/12/19 01:25


 


BP  125/68   02/12/19 01:25


 


Pulse Ox  96   02/12/19 01:25














- Labs


Result Diagrams: 


                                 02/12/19 05:40





                                 02/12/19 05:40


Labs: 


                         Laboratory Results - last 24 hr











  02/11/19 02/11/19 02/11/19





  19:21 19:21 19:21


 


WBC  5.8  


 


RBC  4.79  


 


Hgb  14.5  D  


 


Hct  44.8  


 


MCV  93.5  


 


MCH  30.3  


 


MCHC  32.4 L  


 


RDW  16.0 H  


 


Plt Count  182  


 


MPV  8.7  


 


Neut % (Auto)  56.7  


 


Lymph % (Auto)  32.6  


 


Mono % (Auto)  7.6  


 


Eos % (Auto)  3.0  


 


Baso % (Auto)  0.1  


 


Neut # (Auto)  3.3  


 


Lymph # (Auto)  1.9  


 


Mono # (Auto)  0.4  


 


Eos # (Auto)  0.2  


 


Baso # (Auto)  0.0  


 


PT    25.7 H


 


INR    2.3


 


APTT    46.5 H


 


Sodium   134 


 


Potassium   5.5 H 


 


Chloride   95 L 


 


Carbon Dioxide   25 


 


Anion Gap   20 


 


BUN   9 


 


Creatinine   0.8 


 


Est GFR ( Amer)   > 60 


 


Est GFR (Non-Af Amer)   > 60 


 


POC Glucose (mg/dL)   


 


Random Glucose   106 H 


 


Calcium   9.6 


 


Total Bilirubin   0.8 


 


AST   38 H D 


 


ALT   13 


 


Alkaline Phosphatase   105 


 


Total Protein   8.7 H 


 


Albumin   4.4 


 


Globulin   4.3 H 


 


Albumin/Globulin Ratio   1.0 


 


Urine Color   


 


Urine Clarity   


 


Urine pH   


 


Ur Specific Gravity   


 


Urine Protein   


 


Urine Glucose (UA)   


 


Urine Ketones   


 


Urine Blood   


 


Urine Nitrate   


 


Urine Bilirubin   


 


Urine Urobilinogen   


 


Ur Leukocyte Esterase   


 


Urine RBC (Auto)   


 


Urine Microscopic WBC   


 


Ur Squamous Epith Cells   


 


Urine Bacteria   


 


Blood Type   


 


Antibody Screen   


 


BBK History Checked   














  02/11/19 02/11/19 02/12/19





  19:21 22:22 04:45


 


WBC   


 


RBC   


 


Hgb   


 


Hct   


 


MCV   


 


MCH   


 


MCHC   


 


RDW   


 


Plt Count   


 


MPV   


 


Neut % (Auto)   


 


Lymph % (Auto)   


 


Mono % (Auto)   


 


Eos % (Auto)   


 


Baso % (Auto)   


 


Neut # (Auto)   


 


Lymph # (Auto)   


 


Mono # (Auto)   


 


Eos # (Auto)   


 


Baso # (Auto)   


 


PT   


 


INR   


 


APTT   


 


Sodium   


 


Potassium   


 


Chloride   


 


Carbon Dioxide   


 


Anion Gap   


 


BUN   


 


Creatinine   


 


Est GFR ( Amer)   


 


Est GFR (Non-Af Amer)   


 


POC Glucose (mg/dL)   98 


 


Random Glucose   


 


Calcium   


 


Total Bilirubin   


 


AST   


 


ALT   


 


Alkaline Phosphatase   


 


Total Protein   


 


Albumin   


 


Globulin   


 


Albumin/Globulin Ratio   


 


Urine Color    Yellow


 


Urine Clarity    Clear


 


Urine pH    6.0


 


Ur Specific Gravity    1.006


 


Urine Protein    Negative


 


Urine Glucose (UA)    Neg


 


Urine Ketones    Negative


 


Urine Blood    Negative


 


Urine Nitrate    Negative


 


Urine Bilirubin    Negative


 


Urine Urobilinogen    0.2-1.0


 


Ur Leukocyte Esterase    Neg


 


Urine RBC (Auto)    < 1


 


Urine Microscopic WBC    < 1


 


Ur Squamous Epith Cells    1


 


Urine Bacteria    Rare


 


Blood Type  A POSITIVE  


 


Antibody Screen  Negative  


 


BBK History Checked  Patient has bt  














  02/12/19 02/12/19 02/12/19





  05:40 05:40 05:40


 


WBC  4.9  


 


RBC  4.31  


 


Hgb  13.0  


 


Hct  39.8  


 


MCV  92.4  


 


MCH  30.1  


 


MCHC  32.6 L  


 


RDW  16.2 H  


 


Plt Count  178  


 


MPV  9.1  


 


Neut % (Auto)  54.8  


 


Lymph % (Auto)  31.0  


 


Mono % (Auto)  9.4  


 


Eos % (Auto)  3.7  


 


Baso % (Auto)  1.1  


 


Neut # (Auto)  2.7  


 


Lymph # (Auto)  1.5  


 


Mono # (Auto)  0.5  


 


Eos # (Auto)  0.2  


 


Baso # (Auto)  0.1  


 


PT   


 


INR   


 


APTT   


 


Sodium   137 


 


Potassium   3.9 


 


Chloride   101 


 


Carbon Dioxide   25 


 


Anion Gap   15 


 


BUN   7 


 


Creatinine   0.7 


 


Est GFR ( Amer)   > 60 


 


Est GFR (Non-Af Amer)   > 60 


 


POC Glucose (mg/dL)    121 H


 


Random Glucose   100 


 


Calcium   9.6 


 


Total Bilirubin   


 


AST   


 


ALT   


 


Alkaline Phosphatase   


 


Total Protein   


 


Albumin   


 


Globulin   


 


Albumin/Globulin Ratio   


 


Urine Color   


 


Urine Clarity   


 


Urine pH   


 


Ur Specific Gravity   


 


Urine Protein   


 


Urine Glucose (UA)   


 


Urine Ketones   


 


Urine Blood   


 


Urine Nitrate   


 


Urine Bilirubin   


 


Urine Urobilinogen   


 


Ur Leukocyte Esterase   


 


Urine RBC (Auto)   


 


Urine Microscopic WBC   


 


Ur Squamous Epith Cells   


 


Urine Bacteria   


 


Blood Type   


 


Antibody Screen   


 


BBK History Checked   














Assessment & Plan





- Assessment and Plan (Free Text)


Assessment: 





COPD--STABLE


SEPTIC ARTHRITIS R KNEE


S/P R TOTAL KNEE REPLACEMENT


HTN


DM


HYPERLIPIDEMIA


CHRONIC CIGARETTE SMOKER


Plan: 





AGREE WITH IV ANTIBIOTIC RX AND ORTHO INTERVENTION


NO PULMONARY CONTRAINDICATION FOR SURGERY


CLEARED FOR SURGERY FROM THE PULMONARY VIEW POINT


NICOTINE PATCH ORDERED TO ASSIST IN SMOKING CESATION





- Date & Time


Date: 02/12/19


Time: 08:07

## 2019-02-12 NOTE — [id number].[id number]
Surgeon's Initial Post Op Note





- Surgeon's Notes


Surgeon: Stefania Crooks MD 


Assistant: Michel Snow MD; Dameon Lambert PA-C


Type of Anesthesia: General Endo


Pre-Operative Diagnosis: Right knee wound dihesence s/p RTKR


Operative Findings: See op report


Post-Operative Diagnosis: same as pre-op dx


Operation Performed: Right knee Irrigation and Debridement


Specimen/Specimens Removed: right knee cultures


Estimated Blood Loss: EBL {In ML}: 30


Date of Surgery/Procedure: 02/12/19


Time of Surgery/Procedure: 17:00

## 2019-02-12 NOTE — CP.PCM.CON
History of Present Illness





- History of Present Illness


History of Present Illness: 


Orthopedic consult: Dr. Snow





Patient is a 59 y/o female admitted with complaints of right knee pain and wound

drainage.  The patient is 1 month s/p R TKA, who was sent to TCU for acute 

rehabilatation and then home stable.  Four days ago, she had noticed a scab 

flake off from the mid aspect of  the wound with mild yellowish drainage.  She 

presented to Dr. Snow's office and was advised to be admitted to [facility] for 

cultures and IV antibiotics.  Currently, she complains of anterior knee pain 

about the site of the wound drainage, that is dull and constant affecting her 

ability to transfer, bend and ambulate.  She denies any fevers/radiation of 

pain/numbness/tingling.  She also denies any CP/SOB/N/V/D/dysuria/melena.  








Review of Systems





- Review of Systems


All systems: reviewed and no additional remarkable complaints except


Review of Systems: 





as per HPI





Past Patient History





- Infectious Disease


Hx of Infectious Diseases: None





- Tetanus Immunizations


Tetanus Immunization: Unknown





- Past Medical History & Family History


Past Medical History?: Yes


Past Family History: Reviewed and not pertinent





- Past Social History


Smoking Status: Light Smoker < 10 Cigarettes Daily


Alcohol: None


Drugs: Denies





- CARDIAC


Hx Cardiac Disorders: Yes


Hx Hypercholesterolemia: Yes


Hx Hypertension: Yes





- PULMONARY


Hx Respiratory Disorders: Yes


Hx Asthma: Yes


Hx Bronchitis: Yes


Hx Chronic Obstructive Pulmonary Disease (COPD): No (denies hx)


Hx Pneumonia: No (denies hx)


Hx Pulmonary Embolism: No (denies hx)





- NEUROLOGICAL


Hx Neurological Disorder: No





- HEENT


Hx HEENT Problems: Yes


Hx Glaucoma: Yes


Other/Comment: eyeglasses





- RENAL


Hx Chronic Kidney Disease: No





- ENDOCRINE/METABOLIC


Hx Endocrine Disorders: Yes


Hx Diabetes Mellitus Type 2: Yes





- HEMATOLOGICAL/ONCOLOGICAL


Hx Blood Disorders: No


Hx AIDS: No


Hx Human Immunodeficiency Virus (HIV): No





- INTEGUMENTARY


Hx Dermatological Problems: No





- MUSCULOSKELETAL/RHEUMATOLOGICAL


Hx Musculoskeletal Disorders: Yes


Hx Arthritis: Yes


Hx Back Pain: Yes


Hx Falls: No


Hx Herniated Disk: Yes





- GASTROINTESTINAL


Hx Gastrointestinal Disorders: No


Hx Ulcer: No (denies hx)





- GENITOURINARY/GYNECOLOGICAL


Hx Genitourinary Disorders: No





- PSYCHIATRIC


Hx Psychophysiologic Disorder: Yes


Hx Anxiety: Yes


Hx Depression: Yes


Hx Substance Use: No





- SURGICAL HISTORY


Hx Surgeries: Yes


Hx Musculoskeletal Surgery: Yes (R TKR 1/14/19)


Hx Tonsillectomy: Yes


Hx Tubal Ligation: Yes


Other/Comment: IVC filter after DVT to R arm





- ANESTHESIA


Hx Anesthesia: Yes


Hx Anesthesia Reactions: No


Hx Malignant Hyperthermia: No





Meds


Allergies/Adverse Reactions: 


                                    Allergies











Allergy/AdvReac Type Severity Reaction Status Date / Time


 


No Known Allergies Allergy   Verified 01/11/19 06:29














- Medications


Medications: 


                               Current Medications





Acetaminophen (Tylenol 325mg Tab)  650 mg PO Q4 PRN


   PRN Reason: Fever >100.4 F


Acetaminophen (Tylenol 325mg Tab)  650 mg PO Q4 PRN


   PRN Reason: Pain, Mild (1-3)


Albuterol/Ipratropium (Duoneb 3 Mg/0.5 Mg (3 Ml) Ud)  3 ml INH RQ4 PRN


   PRN Reason: Shortness of Breath


Alprazolam (Xanax)  1 mg PO BID PRN


   PRN Reason: Anxiety


Aripiprazole (Abilify)  15 mg PO DAILY [facility]


Atorvastatin Calcium (Lipitor)  40 mg PO HS [facility]


   Last Admin: 02/11/19 23:28 Dose:  40 mg


Bisacodyl (Dulcolax)  10 mg PO DAILY PRN


   PRN Reason: Constipation


Cholecalciferol (Vitamin D)  2,000 intlu PO DAILY [facility]


Docusate Sodium (Colace)  100 mg PO BID [facility]


Escitalopram Oxalate (Lexapro)  20 mg PO DAILY [facility]


Folic Acid (Folic Acid)  1 mg PO DAILY [facility]


Gabapentin (Neurontin)  400 mg PO TID [facility]


Insulin Human Lispro (Humalog)  0 units SC ACHS [facility]; Protocol


Mometasone Furoate (Asmanex Twisthaler 110 Mcg)  1 puff INH DAILY [facility]


Morphine Sulfate (Morphine)  2 mg IVP Q4 PRN


   PRN Reason: Pain, moderate (4-7)


   Last Admin: 02/12/19 06:01 Dose:  2 mg


Nifedipine (Procardia Xl)  90 mg PO DAILY [facility]


Oxycodone HCl (Oxycodone Immediate Release Tab)  30 mg PO QAM [facility]


Oxycodone/Acetaminophen (Percocet 5/325 Mg Tab)  2 tab PO QPM [facility]


   Stop: 02/15/19 18:01


Pantoprazole Sodium (Protonix Ec Tab)  40 mg PO DAILY [facility]


Pneumococcal Polyvalent Vaccine (Pneumovax 23 Vaccine)  0.5 ml IM .ONCE ONE


   Stop: 02/12/19 09:01


Tiotropium Bromide (Spiriva)  18 mcg INH DAILY [facility]


Topiramate (Topamax)  100 mg PO BID [facility]


Trazodone HCl (Desyrel)  200 mg PO HS PRN


   PRN Reason: Insomnia


Zolpidem Tartrate (Ambien)  5 mg PO HS PRN


   PRN Reason: Insomnia











Physical Exam





- Constitutional


Appears: Well, No Acute Distress





- Head Exam


Head Exam: ATRAUMATIC, NORMOCEPHALIC





- Eye Exam


Eye Exam: EOMI, Normal appearance





- ENT Exam


ENT Exam: Mucous Membranes Moist





- Respiratory Exam


Respiratory Exam: NORMAL BREATHING PATTERN





- Extremities Exam


Additional comments: 


Right knee: small wound break down with eschar at mid and proximal TKR incision,

no active drainage, mild erythema surrounding


rest of wound is well healed


mild swelling and effusion


diffuse anterior tenderness


sensation intact SP/DP/TN


motor intact EHL/FHL/TA/G


pedal pulse intact


calves soft NT b/l








Results





- Vital Signs


Recent Vital Signs: 


                                Last Vital Signs











Temp  98.8 F   02/12/19 01:25


 


Pulse  64   02/12/19 01:25


 


Resp  20   02/12/19 01:25


 


BP  125/68   02/12/19 01:25


 


Pulse Ox  96   02/12/19 01:25














- Labs


Result Diagrams: 


                                 02/12/19 05:40





                                 02/12/19 05:40


Labs: 


                         Laboratory Results - last 24 hr











  02/11/19 02/11/19 02/11/19





  19:21 19:21 19:21


 


WBC  5.8  


 


RBC  4.79  


 


Hgb  14.5  D  


 


Hct  44.8  


 


MCV  93.5  


 


MCH  30.3  


 


MCHC  32.4 L  


 


RDW  16.0 H  


 


Plt Count  182  


 


MPV  8.7  


 


Neut % (Auto)  56.7  


 


Lymph % (Auto)  32.6  


 


Mono % (Auto)  7.6  


 


Eos % (Auto)  3.0  


 


Baso % (Auto)  0.1  


 


Neut # (Auto)  3.3  


 


Lymph # (Auto)  1.9  


 


Mono # (Auto)  0.4  


 


Eos # (Auto)  0.2  


 


Baso # (Auto)  0.0  


 


PT    25.7 H


 


INR    2.3


 


APTT    46.5 H


 


Sodium   134 


 


Potassium   5.5 H 


 


Chloride   95 L 


 


Carbon Dioxide   25 


 


Anion Gap   20 


 


BUN   9 


 


Creatinine   0.8 


 


Est GFR ( Amer)   > 60 


 


Est GFR (Non-Af Amer)   > 60 


 


POC Glucose (mg/dL)   


 


Random Glucose   106 H 


 


Calcium   9.6 


 


Total Bilirubin   0.8 


 


AST   38 H D 


 


ALT   13 


 


Alkaline Phosphatase   105 


 


Total Protein   8.7 H 


 


Albumin   4.4 


 


Globulin   4.3 H 


 


Albumin/Globulin Ratio   1.0 


 


Urine Color   


 


Urine Clarity   


 


Urine pH   


 


Ur Specific Gravity   


 


Urine Protein   


 


Urine Glucose (UA)   


 


Urine Ketones   


 


Urine Blood   


 


Urine Nitrate   


 


Urine Bilirubin   


 


Urine Urobilinogen   


 


Ur Leukocyte Esterase   


 


Urine RBC (Auto)   


 


Urine Microscopic WBC   


 


Ur Squamous Epith Cells   


 


Urine Bacteria   


 


Blood Type   


 


Antibody Screen   


 


BBK History Checked   














  02/11/19 02/11/19 02/12/19





  19:21 22:22 04:45


 


WBC   


 


RBC   


 


Hgb   


 


Hct   


 


MCV   


 


MCH   


 


MCHC   


 


RDW   


 


Plt Count   


 


MPV   


 


Neut % (Auto)   


 


Lymph % (Auto)   


 


Mono % (Auto)   


 


Eos % (Auto)   


 


Baso % (Auto)   


 


Neut # (Auto)   


 


Lymph # (Auto)   


 


Mono # (Auto)   


 


Eos # (Auto)   


 


Baso # (Auto)   


 


PT   


 


INR   


 


APTT   


 


Sodium   


 


Potassium   


 


Chloride   


 


Carbon Dioxide   


 


Anion Gap   


 


BUN   


 


Creatinine   


 


Est GFR ( Amer)   


 


Est GFR (Non-Af Amer)   


 


POC Glucose (mg/dL)   98 


 


Random Glucose   


 


Calcium   


 


Total Bilirubin   


 


AST   


 


ALT   


 


Alkaline Phosphatase   


 


Total Protein   


 


Albumin   


 


Globulin   


 


Albumin/Globulin Ratio   


 


Urine Color    Yellow


 


Urine Clarity    Clear


 


Urine pH    6.0


 


Ur Specific Gravity    1.006


 


Urine Protein    Negative


 


Urine Glucose (UA)    Neg


 


Urine Ketones    Negative


 


Urine Blood    Negative


 


Urine Nitrate    Negative


 


Urine Bilirubin    Negative


 


Urine Urobilinogen    0.2-1.0


 


Ur Leukocyte Esterase    Neg


 


Urine RBC (Auto)    < 1


 


Urine Microscopic WBC    < 1


 


Ur Squamous Epith Cells    1


 


Urine Bacteria    Rare


 


Blood Type  A POSITIVE  


 


Antibody Screen  Negative  


 


BBK History Checked  Patient has bt  














  02/12/19 02/12/19 02/12/19





  05:40 05:40 05:40


 


WBC  4.9  


 


RBC  4.31  


 


Hgb  13.0  


 


Hct  39.8  


 


MCV  92.4  


 


MCH  30.1  


 


MCHC  32.6 L  


 


RDW  16.2 H  


 


Plt Count  178  


 


MPV  9.1  


 


Neut % (Auto)  54.8  


 


Lymph % (Auto)  31.0  


 


Mono % (Auto)  9.4  


 


Eos % (Auto)  3.7  


 


Baso % (Auto)  1.1  


 


Neut # (Auto)  2.7  


 


Lymph # (Auto)  1.5  


 


Mono # (Auto)  0.5  


 


Eos # (Auto)  0.2  


 


Baso # (Auto)  0.1  


 


PT   


 


INR   


 


APTT   


 


Sodium   137 


 


Potassium   3.9 


 


Chloride   101 


 


Carbon Dioxide   25 


 


Anion Gap   15 


 


BUN   7 


 


Creatinine   0.7 


 


Est GFR ( Amer)   > 60 


 


Est GFR (Non-Af Amer)   > 60 


 


POC Glucose (mg/dL)    121 H


 


Random Glucose   100 


 


Calcium   9.6 


 


Total Bilirubin   


 


AST   


 


ALT   


 


Alkaline Phosphatase   


 


Total Protein   


 


Albumin   


 


Globulin   


 


Albumin/Globulin Ratio   


 


Urine Color   


 


Urine Clarity   


 


Urine pH   


 


Ur Specific Gravity   


 


Urine Protein   


 


Urine Glucose (UA)   


 


Urine Ketones   


 


Urine Blood   


 


Urine Nitrate   


 


Urine Bilirubin   


 


Urine Urobilinogen   


 


Ur Leukocyte Esterase   


 


Urine RBC (Auto)   


 


Urine Microscopic WBC   


 


Ur Squamous Epith Cells   


 


Urine Bacteria   


 


Blood Type   


 


Antibody Screen   


 


BBK History Checked   














Assessment & Plan


(1) Postoperative wound infection


Assessment and Plan: 


R TKA wound breakdown with drainage


-Right knee was aspirated using sterile technique.  Specimens sent to lab STAT 

for culture/gram stain/cell count/crystals.


-Plan for revision R TKA with poly exchange versus superficial wound I&D


-keep NPO


-above d/w Dr. Snow in agreement


Status: Acute   





Procedure: Fluid Aspiration





- Time Performed


Time Performed: 08:30





- Time Out


Time Out: Side verified, Site verified, Patient ID confirmed, Sterile procedures

obs.





- Procedure


Procedure: Arthrocentesis





- Consent Obtained


Consent obtained: Written





- Performed By


Performed by: Mid-level Provider





- Indications


Indication(s): Diagnostic, Suspected septic joint





- Contraindications


Contraindications: None





- Location


Location: Right





- Anesthetic Technique


Anesthetic Technique: Topical


Anesthetic: Lidocaine 1%


Procedure: Usual prep and drape, Needle gauge (23 guage for anesthetic, 18 guage

for aspiration)





- Appearance


Appearance: Straw-colored





- Drained


Drained ml: 20





- Post-procedure


Post-procedure: No fluid leak





- Complications


Complications: None





- Patient tolerated procedure


Patient tolerated procedure: Well

## 2019-02-13 LAB
BASOPHILS # BLD AUTO: 0.1 K/UL (ref 0–0.2)
BASOPHILS NFR BLD: 1.4 % (ref 0–2)
BUN SERPL-MCNC: 6 MG/DL (ref 7–17)
CALCIUM SERPL-MCNC: 9 MG/DL (ref 8.4–10.2)
EOSINOPHIL # BLD AUTO: 0.1 K/UL (ref 0–0.7)
EOSINOPHIL NFR BLD: 1.6 % (ref 0–4)
ERYTHROCYTE [DISTWIDTH] IN BLOOD BY AUTOMATED COUNT: 16.3 % (ref 11.5–14.5)
GFR NON-AFRICAN AMERICAN: > 60
HGB BLD-MCNC: 12.3 G/DL (ref 12–16)
LYMPHOCYTES # BLD AUTO: 1.5 K/UL (ref 1–4.3)
LYMPHOCYTES NFR BLD AUTO: 22.6 % (ref 20–40)
MCH RBC QN AUTO: 30.2 PG (ref 27–31)
MCHC RBC AUTO-ENTMCNC: 32.3 G/DL (ref 33–37)
MCV RBC AUTO: 93.5 FL (ref 81–99)
MONOCYTES # BLD: 0.6 K/UL (ref 0–0.8)
MONOCYTES NFR BLD: 8.7 % (ref 0–10)
NEUTROPHILS # BLD: 4.2 K/UL (ref 1.8–7)
NEUTROPHILS NFR BLD AUTO: 65.7 % (ref 50–75)
NRBC BLD AUTO-RTO: 0.2 % (ref 0–0)
PLATELET # BLD: 157 K/UL (ref 130–400)
PMV BLD AUTO: 9.2 FL (ref 7.2–11.7)
RBC # BLD AUTO: 4.06 MIL/UL (ref 3.8–5.2)
WBC # BLD AUTO: 6.5 K/UL (ref 4.8–10.8)

## 2019-02-13 PROCEDURE — 02HV33Z INSERTION OF INFUSION DEVICE INTO SUPERIOR VENA CAVA, PERCUTANEOUS APPROACH: ICD-10-PCS | Performed by: INTERNAL MEDICINE

## 2019-02-13 RX ADMIN — MOMETASONE FUROATE SCH PUFF: 110 INHALANT RESPIRATORY (INHALATION) at 08:25

## 2019-02-13 RX ADMIN — ENOXAPARIN SODIUM SCH MG: 40 INJECTION SUBCUTANEOUS at 08:25

## 2019-02-13 RX ADMIN — INSULIN LISPRO SCH: 100 INJECTION, SOLUTION INTRAVENOUS; SUBCUTANEOUS at 12:30

## 2019-02-13 RX ADMIN — INSULIN LISPRO SCH: 100 INJECTION, SOLUTION INTRAVENOUS; SUBCUTANEOUS at 08:21

## 2019-02-13 RX ADMIN — NIFEDIPINE SCH MG: 90 TABLET, FILM COATED, EXTENDED RELEASE ORAL at 08:27

## 2019-02-13 RX ADMIN — OXYCODONE HYDROCHLORIDE AND ACETAMINOPHEN SCH TAB: 5; 325 TABLET ORAL at 19:12

## 2019-02-13 RX ADMIN — PANTOPRAZOLE SODIUM SCH MG: 40 TABLET, DELAYED RELEASE ORAL at 08:28

## 2019-02-13 RX ADMIN — INSULIN LISPRO SCH: 100 INJECTION, SOLUTION INTRAVENOUS; SUBCUTANEOUS at 17:00

## 2019-02-13 RX ADMIN — VITAMIN D, TAB 1000IU (100/BT) SCH INTLU: 25 TAB at 08:29

## 2019-02-13 RX ADMIN — IPRATROPIUM BROMIDE AND ALBUTEROL SULFATE SCH ML: .5; 3 SOLUTION RESPIRATORY (INHALATION) at 13:46

## 2019-02-13 RX ADMIN — IPRATROPIUM BROMIDE AND ALBUTEROL SULFATE SCH ML: .5; 3 SOLUTION RESPIRATORY (INHALATION) at 19:30

## 2019-02-13 RX ADMIN — ARIPIPRAZOLE SCH MG: 1 SOLUTION ORAL at 08:29

## 2019-02-13 RX ADMIN — TIOTROPIUM BROMIDE SCH MCG: 18 CAPSULE ORAL; RESPIRATORY (INHALATION) at 08:26

## 2019-02-13 RX ADMIN — INSULIN LISPRO SCH: 100 INJECTION, SOLUTION INTRAVENOUS; SUBCUTANEOUS at 22:00

## 2019-02-13 RX ADMIN — OXYCODONE HYDROCHLORIDE SCH MG: 10 TABLET ORAL at 08:22

## 2019-02-13 NOTE — CP.PCM.PN
Subjective





- Date & Time of Evaluation


Date of Evaluation: 02/13/19


Time of Evaluation: 13:31





- Subjective


Subjective: 





ID Note-





Patietn seen and examined today .


pt. pod #1 


- s/p Debridement and Irrigation of Right knee done by [facility]





pt. denies any fever but c/o pain in right knee 











Objective





- Vital Signs/Intake and Output


Vital Signs (last 24 hours): 


                                        











Temp Pulse Resp BP Pulse Ox


 


 97.8 F   56 L  18   102/64   94 L


 


 02/13/19 12:24  02/13/19 12:24  02/13/19 12:24  02/13/19 12:24  02/13/19 12:24








Intake and Output: 


                                        











 02/13/19 02/13/19





 06:59 18:59


 


Intake Total 2050 


 


Output Total 1500 


 


Balance 550 














- Medications


Medications: 


                               Current Medications





Acetaminophen (Tylenol 325mg Tab)  650 mg PO Q4 PRN


   PRN Reason: Fever >100.4 F


Acetaminophen (Tylenol 325mg Tab)  650 mg PO Q4 PRN


   PRN Reason: Pain, Mild (1-3)


Albuterol/Ipratropium (Duoneb 3 Mg/0.5 Mg (3 Ml) Ud)  3 ml INH RQ6 MAT


Alprazolam (Xanax)  1 mg PO BID PRN


   PRN Reason: Anxiety


Aripiprazole (Abilify)  15 mg PO DAILY [facility]


Atorvastatin Calcium (Lipitor)  40 mg PO HS [facility]


   Last Admin: 02/12/19 21:18 Dose:  40 mg


Bisacodyl (Dulcolax)  10 mg PO DAILY PRN


   PRN Reason: Constipation


Cholecalciferol (Vitamin D)  2,000 intlu PO DAILY [facility]


   Last Admin: 02/13/19 08:29 Dose:  2,000 intlu


Docusate Sodium (Colace)  100 mg PO BID [facility]


   Last Admin: 02/13/19 08:27 Dose:  100 mg


Enoxaparin Sodium (Lovenox)  40 mg SC DAILY [facility]; Protocol


   Last Admin: 02/13/19 08:25 Dose:  40 mg


Escitalopram Oxalate (Lexapro)  20 mg PO DAILY [facility]


   Last Admin: 02/13/19 08:28 Dose:  20 mg


Folic Acid (Folic Acid)  1 mg PO DAILY [facility]


   Last Admin: 02/13/19 08:27 Dose:  1 mg


Gabapentin (Neurontin)  400 mg PO TID [facility]


   Last Admin: 02/13/19 13:01 Dose:  400 mg


Cefepime HCl 1 gm/ Sodium (Chloride)  100 mls @ 100 mls/hr IVPB Q8 [facility]; Protocol


   Last Admin: 02/13/19 08:24 Dose:  100 mls/hr


Vancomycin HCl 1 gm/ Sodium (Chloride)  250 mls @ 166.667 mls/hr IVPB Q12 [facility]; 

Protocol


   Last Admin: 02/13/19 08:29 Dose:  166.667 mls/hr


Acetaminophen (Ofirmev)  100 mls @ 400 mls/hr IVPB Q6H [facility]; Protocol


   Stop: 02/13/19 18:01


   Last Admin: 02/13/19 12:58 Dose:  400 mls/hr


Insulin Human Lispro (Humalog)  0 units SC ACHS [facility]; Protocol


   Last Admin: 02/13/19 12:30 Dose:  Not Given


Mometasone Furoate (Asmanex Twisthaler 110 Mcg)  1 puff INH DAILY [facility]


   Last Admin: 02/13/19 08:25 Dose:  1 puff


Morphine Sulfate (Morphine)  2 mg IVP Q4 PRN


   PRN Reason: Pain, moderate (4-7)


   Last Admin: 02/13/19 03:38 Dose:  2 mg


Nicotine (Nicoderm Cq)  1 patch TD DAILY [facility]


   Last Admin: 02/13/19 08:28 Dose:  1 patch


Nifedipine (Procardia Xl)  90 mg PO DAILY [facility]


   Last Admin: 02/13/19 08:27 Dose:  90 mg


Oxycodone HCl (Oxycodone Immediate Release Tab)  30 mg PO QAM [facility]


   Last Admin: 02/13/19 08:22 Dose:  30 mg


Oxycodone/Acetaminophen (Percocet 5/325 Mg Tab)  2 tab PO QPM [facility]


   Stop: 02/15/19 18:01


Pantoprazole Sodium (Protonix Ec Tab)  40 mg PO DAILY [facility]


   Last Admin: 02/13/19 08:28 Dose:  40 mg


Tiotropium Bromide (Spiriva)  18 mcg INH DAILY [facility]


   Last Admin: 02/13/19 08:26 Dose:  18 mcg


Topiramate (Topamax)  100 mg PO BID [facility]


   Last Admin: 02/13/19 08:28 Dose:  100 mg


Trazodone HCl (Desyrel)  200 mg PO HS PRN


   PRN Reason: Insomnia


Zolpidem Tartrate (Ambien)  5 mg PO HS PRN


   PRN Reason: Insomnia


   Last Admin: 02/13/19 00:32 Dose:  5 mg











- Labs


Labs: 


                                        








- Additional Findings


Additional findings: 








- Constitutional


Appears: No Acute Distress





- Head Exam


Head Exam: ATRAUMATIC





- Eye Exam


Eye Exam: EOMI, PERRL





- ENT Exam


ENT Exam: Normal Oropharynx





- Neck Exam


Neck exam: Positive for: Full Rom





- Respiratory Exam


Respiratory Exam: Clear to Auscultation Bilateral, NORMAL BREATHING PATTERN





- Cardiovascular Exam


Cardiovascular Exam: RRR, +S1, +S2





- GI/Abdominal Exam


GI & Abdominal Exam: Normal Bowel Sounds, Soft


Additional comments: 





NT, ND





- Extremities Exam


Additional comments: 





Right anterior knee region s/p surgical I and D and now has sutures in place


no discharge seen now but still has erythema in the region but less than 

yesterday





- Neurological Exam


Neurological exam: Alert, Oriented x 3





                         Laboratory Results - last 72 hr











  02/11/19 02/11/19 02/11/19





  19:21 19:21 19:21


 


WBC  5.8  


 


RBC  4.79  


 


Hgb  14.5  D  


 


Hct  44.8  


 


MCV  93.5  


 


MCH  30.3  


 


MCHC  32.4 L  


 


RDW  16.0 H  


 


Plt Count  182  


 


MPV  8.7  


 


Neut % (Auto)  56.7  


 


Lymph % (Auto)  32.6  


 


Mono % (Auto)  7.6  


 


Eos % (Auto)  3.0  


 


Baso % (Auto)  0.1  


 


Neut # (Auto)  3.3  


 


Lymph # (Auto)  1.9  


 


Mono # (Auto)  0.4  


 


Eos # (Auto)  0.2  


 


Baso # (Auto)  0.0  


 


ESR   


 


PT    25.7 H


 


INR    2.3


 


APTT    46.5 H


 


Sodium   134 


 


Potassium   5.5 H 


 


Chloride   95 L 


 


Carbon Dioxide   25 


 


Anion Gap   20 


 


BUN   9 


 


Creatinine   0.8 


 


Est GFR ( Amer)   > 60 


 


Est GFR (Non-Af Amer)   > 60 


 


POC Glucose (mg/dL)   


 


Random Glucose   106 H 


 


Calcium   9.6 


 


Total Bilirubin   0.8 


 


AST   38 H D 


 


ALT   13 


 


Alkaline Phosphatase   105 


 


Total Protein   8.7 H 


 


Albumin   4.4 


 


Globulin   4.3 H 


 


Albumin/Globulin Ratio   1.0 


 


Urine Color   


 


Urine Clarity   


 


Urine pH   


 


Ur Specific Gravity   


 


Urine Protein   


 


Urine Glucose (UA)   


 


Urine Ketones   


 


Urine Blood   


 


Urine Nitrate   


 


Urine Bilirubin   


 


Urine Urobilinogen   


 


Ur Leukocyte Esterase   


 


Urine RBC (Auto)   


 


Urine Microscopic WBC   


 


Ur Squamous Epith Cells   


 


Urine Bacteria   


 


Fluid Type   


 


Synovial WBC   


 


Synovial RBC   


 


Synovial Neutrophils   


 


Synovial Lymphocytes   


 


Synov Monos/Macrophage   


 


Synovial Fluid Comment   


 


Blood Type   


 


Antibody Screen   


 


BBK History Checked   














  02/11/19 02/11/19 02/12/19





  19:21 22:22 04:45


 


WBC   


 


RBC   


 


Hgb   


 


Hct   


 


MCV   


 


MCH   


 


MCHC   


 


RDW   


 


Plt Count   


 


MPV   


 


Neut % (Auto)   


 


Lymph % (Auto)   


 


Mono % (Auto)   


 


Eos % (Auto)   


 


Baso % (Auto)   


 


Neut # (Auto)   


 


Lymph # (Auto)   


 


Mono # (Auto)   


 


Eos # (Auto)   


 


Baso # (Auto)   


 


ESR   


 


PT   


 


INR   


 


APTT   


 


Sodium   


 


Potassium   


 


Chloride   


 


Carbon Dioxide   


 


Anion Gap   


 


BUN   


 


Creatinine   


 


Est GFR ( Amer)   


 


Est GFR (Non-Af Amer)   


 


POC Glucose (mg/dL)   98 


 


Random Glucose   


 


Calcium   


 


Total Bilirubin   


 


AST   


 


ALT   


 


Alkaline Phosphatase   


 


Total Protein   


 


Albumin   


 


Globulin   


 


Albumin/Globulin Ratio   


 


Urine Color    Yellow


 


Urine Clarity    Clear


 


Urine pH    6.0


 


Ur Specific Gravity    1.006


 


Urine Protein    Negative


 


Urine Glucose (UA)    Neg


 


Urine Ketones    Negative


 


Urine Blood    Negative


 


Urine Nitrate    Negative


 


Urine Bilirubin    Negative


 


Urine Urobilinogen    0.2-1.0


 


Ur Leukocyte Esterase    Neg


 


Urine RBC (Auto)    < 1


 


Urine Microscopic WBC    < 1


 


Ur Squamous Epith Cells    1


 


Urine Bacteria    Rare


 


Fluid Type   


 


Synovial WBC   


 


Synovial RBC   


 


Synovial Neutrophils   


 


Synovial Lymphocytes   


 


Synov Monos/Macrophage   


 


Synovial Fluid Comment   


 


Blood Type  A POSITIVE  


 


Antibody Screen  Negative  


 


BBK History Checked  Patient has bt  














  02/12/19 02/12/19 02/12/19





  05:40 05:40 05:40


 


WBC  4.9  


 


RBC  4.31  


 


Hgb  13.0  


 


Hct  39.8  


 


MCV  92.4  


 


MCH  30.1  


 


MCHC  32.6 L  


 


RDW  16.2 H  


 


Plt Count  178  


 


MPV  9.1  


 


Neut % (Auto)  54.8  


 


Lymph % (Auto)  31.0  


 


Mono % (Auto)  9.4  


 


Eos % (Auto)  3.7  


 


Baso % (Auto)  1.1  


 


Neut # (Auto)  2.7  


 


Lymph # (Auto)  1.5  


 


Mono # (Auto)  0.5  


 


Eos # (Auto)  0.2  


 


Baso # (Auto)  0.1  


 


ESR   


 


PT   


 


INR   


 


APTT   


 


Sodium   137 


 


Potassium   3.9 


 


Chloride   101 


 


Carbon Dioxide   25 


 


Anion Gap   15 


 


BUN   7 


 


Creatinine   0.7 


 


Est GFR ( Amer)   > 60 


 


Est GFR (Non-Af Amer)   > 60 


 


POC Glucose (mg/dL)    121 H


 


Random Glucose   100 


 


Calcium   9.6 


 


Total Bilirubin   


 


AST   


 


ALT   


 


Alkaline Phosphatase   


 


Total Protein   


 


Albumin   


 


Globulin   


 


Albumin/Globulin Ratio   


 


Urine Color   


 


Urine Clarity   


 


Urine pH   


 


Ur Specific Gravity   


 


Urine Protein   


 


Urine Glucose (UA)   


 


Urine Ketones   


 


Urine Blood   


 


Urine Nitrate   


 


Urine Bilirubin   


 


Urine Urobilinogen   


 


Ur Leukocyte Esterase   


 


Urine RBC (Auto)   


 


Urine Microscopic WBC   


 


Ur Squamous Epith Cells   


 


Urine Bacteria   


 


Fluid Type   


 


Synovial WBC   


 


Synovial RBC   


 


Synovial Neutrophils   


 


Synovial Lymphocytes   


 


Synov Monos/Macrophage   


 


Synovial Fluid Comment   


 


Blood Type   


 


Antibody Screen   


 


BBK History Checked   














  02/12/19 02/12/19 02/12/19





  09:05 10:04 11:14


 


WBC   


 


RBC   


 


Hgb   


 


Hct   


 


MCV   


 


MCH   


 


MCHC   


 


RDW   


 


Plt Count   


 


MPV   


 


Neut % (Auto)   


 


Lymph % (Auto)   


 


Mono % (Auto)   


 


Eos % (Auto)   


 


Baso % (Auto)   


 


Neut # (Auto)   


 


Lymph # (Auto)   


 


Mono # (Auto)   


 


Eos # (Auto)   


 


Baso # (Auto)   


 


ESR   


 


PT   14.7 H D 


 


INR   1.3 


 


APTT   33.0 


 


Sodium   


 


Potassium   


 


Chloride   


 


Carbon Dioxide   


 


Anion Gap   


 


BUN   


 


Creatinine   


 


Est GFR ( Amer)   


 


Est GFR (Non-Af Amer)   


 


POC Glucose (mg/dL)    94


 


Random Glucose   


 


Calcium   


 


Total Bilirubin   


 


AST   


 


ALT   


 


Alkaline Phosphatase   


 


Total Protein   


 


Albumin   


 


Globulin   


 


Albumin/Globulin Ratio   


 


Urine Color   


 


Urine Clarity   


 


Urine pH   


 


Ur Specific Gravity   


 


Urine Protein   


 


Urine Glucose (UA)   


 


Urine Ketones   


 


Urine Blood   


 


Urine Nitrate   


 


Urine Bilirubin   


 


Urine Urobilinogen   


 


Ur Leukocyte Esterase   


 


Urine RBC (Auto)   


 


Urine Microscopic WBC   


 


Ur Squamous Epith Cells   


 


Urine Bacteria   


 


Fluid Type  Synovial fluid  


 


Synovial WBC  164.0 H  


 


Synovial RBC  50230.0 H  


 


Synovial Neutrophils  34.0 H  


 


Synovial Lymphocytes  39.0 H  


 


Synov Monos/Macrophage  27 H  


 


Synovial Fluid Comment  Moderately bloody  


 


Blood Type   


 


Antibody Screen   


 


BBK History Checked   














  02/12/19 02/12/19 02/12/19





  14:38 16:05 18:08


 


WBC   


 


RBC   


 


Hgb   


 


Hct   


 


MCV   


 


MCH   


 


MCHC   


 


RDW   


 


Plt Count   


 


MPV   


 


Neut % (Auto)   


 


Lymph % (Auto)   


 


Mono % (Auto)   


 


Eos % (Auto)   


 


Baso % (Auto)   


 


Neut # (Auto)   


 


Lymph # (Auto)   


 


Mono # (Auto)   


 


Eos # (Auto)   


 


Baso # (Auto)   


 


ESR  38 H  


 


PT   


 


INR   


 


APTT   


 


Sodium   


 


Potassium   


 


Chloride   


 


Carbon Dioxide   


 


Anion Gap   


 


BUN   


 


Creatinine   


 


Est GFR ( Amer)   


 


Est GFR (Non-Af Amer)   


 


POC Glucose (mg/dL)   99  151 H


 


Random Glucose   


 


Calcium   


 


Total Bilirubin   


 


AST   


 


ALT   


 


Alkaline Phosphatase   


 


Total Protein   


 


Albumin   


 


Globulin   


 


Albumin/Globulin Ratio   


 


Urine Color   


 


Urine Clarity   


 


Urine pH   


 


Ur Specific Gravity   


 


Urine Protein   


 


Urine Glucose (UA)   


 


Urine Ketones   


 


Urine Blood   


 


Urine Nitrate   


 


Urine Bilirubin   


 


Urine Urobilinogen   


 


Ur Leukocyte Esterase   


 


Urine RBC (Auto)   


 


Urine Microscopic WBC   


 


Ur Squamous Epith Cells   


 


Urine Bacteria   


 


Fluid Type   


 


Synovial WBC   


 


Synovial RBC   


 


Synovial Neutrophils   


 


Synovial Lymphocytes   


 


Synov Monos/Macrophage   


 


Synovial Fluid Comment   


 


Blood Type   


 


Antibody Screen   


 


BBK History Checked   














  02/12/19 02/13/19 02/13/19





  21:19 04:35 04:35


 


WBC   6.5 


 


RBC   4.06 


 


Hgb   12.3 


 


Hct   38.0 


 


MCV   93.5 


 


MCH   30.2 


 


MCHC   32.3 L 


 


RDW   16.3 H 


 


Plt Count   157 


 


MPV   9.2 


 


Neut % (Auto)   65.7 


 


Lymph % (Auto)   22.6 


 


Mono % (Auto)   8.7 


 


Eos % (Auto)   1.6 


 


Baso % (Auto)   1.4 


 


Neut # (Auto)   4.2 


 


Lymph # (Auto)   1.5 


 


Mono # (Auto)   0.6 


 


Eos # (Auto)   0.1 


 


Baso # (Auto)   0.1 


 


ESR   


 


PT   


 


INR   


 


APTT   


 


Sodium    137


 


Potassium    3.8


 


Chloride    105


 


Carbon Dioxide    23


 


Anion Gap    13


 


BUN    6 L


 


Creatinine    0.6 L


 


Est GFR ( Amer)    > 60


 


Est GFR (Non-Af Amer)    > 60


 


POC Glucose (mg/dL)  107  


 


Random Glucose    103


 


Calcium    9.0


 


Total Bilirubin   


 


AST   


 


ALT   


 


Alkaline Phosphatase   


 


Total Protein   


 


Albumin   


 


Globulin   


 


Albumin/Globulin Ratio   


 


Urine Color   


 


Urine Clarity   


 


Urine pH   


 


Ur Specific Gravity   


 


Urine Protein   


 


Urine Glucose (UA)   


 


Urine Ketones   


 


Urine Blood   


 


Urine Nitrate   


 


Urine Bilirubin   


 


Urine Urobilinogen   


 


Ur Leukocyte Esterase   


 


Urine RBC (Auto)   


 


Urine Microscopic WBC   


 


Ur Squamous Epith Cells   


 


Urine Bacteria   


 


Fluid Type   


 


Synovial WBC   


 


Synovial RBC   


 


Synovial Neutrophils   


 


Synovial Lymphocytes   


 


Synov Monos/Macrophage   


 


Synovial Fluid Comment   


 


Blood Type   


 


Antibody Screen   


 


BBK History Checked   














  02/13/19 02/13/19 02/13/19





  05:21 11:33 16:06


 


WBC   


 


RBC   


 


Hgb   


 


Hct   


 


MCV   


 


MCH   


 


MCHC   


 


RDW   


 


Plt Count   


 


MPV   


 


Neut % (Auto)   


 


Lymph % (Auto)   


 


Mono % (Auto)   


 


Eos % (Auto)   


 


Baso % (Auto)   


 


Neut # (Auto)   


 


Lymph # (Auto)   


 


Mono # (Auto)   


 


Eos # (Auto)   


 


Baso # (Auto)   


 


ESR   


 


PT   


 


INR   


 


APTT   


 


Sodium   


 


Potassium   


 


Chloride   


 


Carbon Dioxide   


 


Anion Gap   


 


BUN   


 


Creatinine   


 


Est GFR ( Amer)   


 


Est GFR (Non-Af Amer)   


 


POC Glucose (mg/dL)  107  132 H  110


 


Random Glucose   


 


Calcium   


 


Total Bilirubin   


 


AST   


 


ALT   


 


Alkaline Phosphatase   


 


Total Protein   


 


Albumin   


 


Globulin   


 


Albumin/Globulin Ratio   


 


Urine Color   


 


Urine Clarity   


 


Urine pH   


 


Ur Specific Gravity   


 


Urine Protein   


 


Urine Glucose (UA)   


 


Urine Ketones   


 


Urine Blood   


 


Urine Nitrate   


 


Urine Bilirubin   


 


Urine Urobilinogen   


 


Ur Leukocyte Esterase   


 


Urine RBC (Auto)   


 


Urine Microscopic WBC   


 


Ur Squamous Epith Cells   


 


Urine Bacteria   


 


Fluid Type   


 


Synovial WBC   


 


Synovial RBC   


 


Synovial Neutrophils   


 


Synovial Lymphocytes   


 


Synov Monos/Macrophage   


 


Synovial Fluid Comment   


 


Blood Type   


 


Antibody Screen   


 


BBK History Checked   








                                  Microbiology





02/12/19 18:00   Knee - Right   Gram Stain - Final


02/12/19 18:00   Knee - Right   Wound Culture - Preliminary


                            Gram Positive Cocci


02/12/19 09:00   Synovial Fluid   Gram Stain - Final


02/12/19 09:00   Synovial Fluid   Body Fluid Culture - Preliminary


                            NO GROWTH AFTER 24 HOURS


02/12/19 09:00   Synovial Fluid   Gram Stain - Final


02/12/19 09:00   Synovial Fluid   Body Fluid Culture - Preliminary


                            NO GROWTH AFTER 24 HOURS


02/12/19 09:00   Synovial Fluid   Gram Stain - Final


02/12/19 09:00   Synovial Fluid   Body Fluid Culture - Preliminary


                            NO GROWTH AFTER 24 HOURS


02/11/19 19:56   Knee - Right   Gram Stain - Final


02/11/19 19:56   Knee - Right   Wound Culture - Preliminary


                            Gram Positive Cocci


02/12/19 18:00   Knee - Right   Gram Stain - Final


02/12/19 18:00   Knee - Right   Wound Culture - Preliminary


                            Gram Positive Cocci


02/12/19 18:00   Knee - Right   Gram Stain - Final


02/12/19 18:00   Knee - Right   Wound Culture - Preliminary


                            NO GROWTH AFTER 24 HOURS


02/11/19 20:26   Blood-Venous   Blood Culture - Preliminary


                            NO GROWTH AFTER 24 HOURS


02/11/19 19:56   Blood-Venous   Blood Culture - Preliminary


                            NO GROWTH AFTER 24 HOURS





                                        





   














Assessment and Plan


(1) Postoperative wound infection


Status: Acute   





(2) COPD (chronic obstructive pulmonary disease)


Status: Acute   





(3) Diabetes mellitus type 2 in obese


Status: Acute   





- Assessment and Plan (Free Text)


Assessment: 





A/P-


58 year old female with multiple medical conditions including DM II, HTN, COPD 

admitted with right knee wound site infection post TKR a month ago.





afebrile


normal wbc count


synovial fluid cell count not c/w septic joint


however right knee OR wound cx- GPC x 3


Blood cx- neg x 1





Plan-


Await ID and sensitivity of the GPC.


advise to continue with IV vancomycin  day #2 for GPC  knee infection .


keep vanco trough <15.


advise 4-6 weeks of IV vancomycin for treatment of prosthetic knee GPC 

infection.


monitor renal function and hearing while on vancomycin.





advise patient to have  close f/u with her ortho doctor  and if patient develops

any fever or no improvement while on IV abx then would need to have hardware 

removal at that time and few weeks of IV abx till joint space is sterilized .








all above d/w patient at length adn she verbalizes full understandingof all 

above and agrees with above plan of care.

## 2019-02-13 NOTE — CP.PCM.PN
Subjective





- Date & Time of Evaluation


Date of Evaluation: 02/13/19


Time of Evaluation: 08:30





- Subjective


Subjective: 


Patient seen and examined at bedside comfortable. Pain is well controlled. No 

acute events overnight. No new complaints. Denies CP/SOB/dizziness/fever.








Objective





- Vital Signs/Intake and Output


Vital Signs (last 24 hours): 


                                        











Temp Pulse Resp BP Pulse Ox


 


 97.7 F   66   18   147/85   99 


 


 02/13/19 07:44  02/13/19 07:44  02/13/19 07:44  02/13/19 07:44  02/13/19 07:44








Intake and Output: 


                                        











 02/13/19 02/13/19





 06:59 18:59


 


Intake Total 2050 


 


Output Total 1500 


 


Balance 550 














- Medications


Medications: 


                               Current Medications





Acetaminophen (Tylenol 325mg Tab)  650 mg PO Q4 PRN


   PRN Reason: Fever >100.4 F


Acetaminophen (Tylenol 325mg Tab)  650 mg PO Q4 PRN


   PRN Reason: Pain, Mild (1-3)


Albuterol/Ipratropium (Duoneb 3 Mg/0.5 Mg (3 Ml) Ud)  3 ml INH Q6 [facility]


Alprazolam (Xanax)  1 mg PO BID PRN


   PRN Reason: Anxiety


Aripiprazole (Abilify)  15 mg PO DAILY [facility]


   Last Admin: 02/13/19 08:29 Dose:  15 mg


Atorvastatin Calcium (Lipitor)  40 mg PO HS [facility]


   Last Admin: 02/12/19 21:18 Dose:  40 mg


Bisacodyl (Dulcolax)  10 mg PO DAILY PRN


   PRN Reason: Constipation


Cholecalciferol (Vitamin D)  2,000 intlu PO DAILY [facility]


   Last Admin: 02/13/19 08:29 Dose:  2,000 intlu


Docusate Sodium (Colace)  100 mg PO BID [facility]


   Last Admin: 02/13/19 08:27 Dose:  100 mg


Enoxaparin Sodium (Lovenox)  40 mg SC DAILY [facility]; Protocol


   Last Admin: 02/13/19 08:25 Dose:  40 mg


Escitalopram Oxalate (Lexapro)  20 mg PO DAILY [facility]


   Last Admin: 02/13/19 08:28 Dose:  20 mg


Folic Acid (Folic Acid)  1 mg PO DAILY [facility]


   Last Admin: 02/13/19 08:27 Dose:  1 mg


Gabapentin (Neurontin)  400 mg PO TID [facility]


   Last Admin: 02/13/19 08:28 Dose:  400 mg


Cefepime HCl 1 gm/ Sodium (Chloride)  100 mls @ 100 mls/hr IVPB Q8 [facility]; Protocol


   Last Admin: 02/13/19 08:24 Dose:  100 mls/hr


Vancomycin HCl 1 gm/ Sodium (Chloride)  250 mls @ 166.667 mls/hr IVPB Q12 [facility]; 

Protocol


   Last Admin: 02/13/19 08:29 Dose:  166.667 mls/hr


Acetaminophen (Ofirmev)  100 mls @ 400 mls/hr IVPB Q6H [facility]; Protocol


   Stop: 02/13/19 18:01


   Last Admin: 02/13/19 05:04 Dose:  400 mls/hr


Insulin Human Lispro (Humalog)  0 units SC ACHS [facility]; Protocol


   Last Admin: 02/13/19 08:21 Dose:  Not Given


Mometasone Furoate (Asmanex Twisthaler 110 Mcg)  1 puff INH DAILY [facility]


   Last Admin: 02/13/19 08:25 Dose:  1 puff


Morphine Sulfate (Morphine)  2 mg IVP Q4 PRN


   PRN Reason: Pain, moderate (4-7)


   Last Admin: 02/13/19 03:38 Dose:  2 mg


Nicotine (Nicoderm Cq)  1 patch TD DAILY [facility]


   Last Admin: 02/13/19 08:28 Dose:  1 patch


Nifedipine (Procardia Xl)  90 mg PO DAILY [facility]


   Last Admin: 02/13/19 08:27 Dose:  90 mg


Oxycodone HCl (Oxycodone Immediate Release Tab)  30 mg PO QAM [facility]


   Last Admin: 02/13/19 08:22 Dose:  30 mg


Oxycodone/Acetaminophen (Percocet 5/325 Mg Tab)  2 tab PO QPM [facility]


   Stop: 02/15/19 18:01


Pantoprazole Sodium (Protonix Ec Tab)  40 mg PO DAILY [facility]


   Last Admin: 02/13/19 08:28 Dose:  40 mg


Tiotropium Bromide (Spiriva)  18 mcg INH DAILY [facility]


   Last Admin: 02/13/19 08:26 Dose:  18 mcg


Topiramate (Topamax)  100 mg PO BID [facility]


   Last Admin: 02/13/19 08:28 Dose:  100 mg


Trazodone HCl (Desyrel)  200 mg PO HS PRN


   PRN Reason: Insomnia


Zolpidem Tartrate (Ambien)  5 mg PO HS PRN


   PRN Reason: Insomnia


   Last Admin: 02/13/19 00:32 Dose:  5 mg











- Labs


Labs: 


                                        





                                 02/13/19 04:35 





                                 02/13/19 04:35 





                                        











PT  14.7 Seconds (9.8-13.1)  H D 02/12/19  10:04    


 


INR  1.3   02/12/19  10:04    


 


APTT  33.0 Seconds (25.6-37.1)   02/12/19  10:04    














- Extremities Exam


Additional comments: 


RLE: knee immobilizer in place


Dressings CDI


sensation intact SP/DP/TN


motor intact EHL/FHL/TA/G


pedal pulse inact


calves soft NT b/l








Assessment and Plan


(1) Wound dehiscence, surgical


Assessment & Plan: 


POD# 1 s/p superficial wound I&D, plastics closure


-strict knee immobilizer


-PT/OT


-awaiting final culture report, GPC seen on GM stain on 2/3 OR superficial 

tissue cultures. No growth x 48 hrs from bedside aspiration synovial fluid. 


-Dressing change tomorrow by Dr. Aguiar


-abx as per ID


-above d/w Dr. Snow in agreement


Status: Acute

## 2019-02-13 NOTE — RAD
HISTORY:

 PICC insertion verification 



COMPARISON:

Chest x-ray performed 2/11/19 



TECHNIQUE:

Chest, one view.



FINDINGS:

Examination limited by habitus.  



Right-sided PICC terminates at the expected location of the SVC.



LUNGS:

No focal consolidation.



Please note that chest x-ray has limited sensitivity for the 

detection of pulmonary masses.



PLEURA:

No significant pleural effusion identified. No definite pneumothorax .



CARDIOVASCULAR:

Heart size appears within normal limits.  Ectatic aorta.  

Atherosclerotic calcifications of the aortic knob. 



OSSEOUS STRUCTURES:

Degenerative changes.



VISUALIZED UPPER ABDOMEN:

Unremarkable.



OTHER FINDINGS:

None.



IMPRESSION:

Right-sided PICC terminates at the expected location of the SVC.

## 2019-02-13 NOTE — CP.PCM.PN
Subjective





- Date & Time of Evaluation


Date of Evaluation: 02/13/19


Time of Evaluation: 08:45





- Subjective


Subjective: 





S/P R KNEE SURGICAL DEBRIDEMENT


STILL HAS KNEE PAINS


AFEBRILE 


NO SOB


VSS





Objective





- Vital Signs/Intake and Output


Vital Signs (last 24 hours): 


                                        











Temp Pulse Resp BP Pulse Ox


 


 97.7 F   66   18   147/85   99 


 


 02/13/19 07:44  02/13/19 07:44  02/13/19 07:44  02/13/19 07:44  02/13/19 07:44








Intake and Output: 


                                        











 02/13/19 02/13/19





 06:59 18:59


 


Intake Total 2050 


 


Output Total 1500 


 


Balance 550 














- Medications


Medications: 


                               Current Medications





Acetaminophen (Tylenol 325mg Tab)  650 mg PO Q4 PRN


   PRN Reason: Fever >100.4 F


Acetaminophen (Tylenol 325mg Tab)  650 mg PO Q4 PRN


   PRN Reason: Pain, Mild (1-3)


Alprazolam (Xanax)  1 mg PO BID PRN


   PRN Reason: Anxiety


Aripiprazole (Abilify)  15 mg PO DAILY [facility]


   Last Admin: 02/13/19 08:29 Dose:  15 mg


Atorvastatin Calcium (Lipitor)  40 mg PO HS [facility]


   Last Admin: 02/12/19 21:18 Dose:  40 mg


Bisacodyl (Dulcolax)  10 mg PO DAILY PRN


   PRN Reason: Constipation


Cholecalciferol (Vitamin D)  2,000 intlu PO DAILY [facility]


   Last Admin: 02/13/19 08:29 Dose:  2,000 intlu


Docusate Sodium (Colace)  100 mg PO BID [facility]


   Last Admin: 02/13/19 08:27 Dose:  100 mg


Enoxaparin Sodium (Lovenox)  40 mg SC DAILY [facility]; Protocol


   Last Admin: 02/13/19 08:25 Dose:  40 mg


Escitalopram Oxalate (Lexapro)  20 mg PO DAILY [facility]


   Last Admin: 02/13/19 08:28 Dose:  20 mg


Folic Acid (Folic Acid)  1 mg PO DAILY [facility]


   Last Admin: 02/13/19 08:27 Dose:  1 mg


Gabapentin (Neurontin)  400 mg PO TID [facility]


   Last Admin: 02/13/19 08:28 Dose:  400 mg


Cefepime HCl 1 gm/ Sodium (Chloride)  100 mls @ 100 mls/hr IVPB Q8 [facility]; Protocol


   Last Admin: 02/13/19 08:24 Dose:  100 mls/hr


Vancomycin HCl 1 gm/ Sodium (Chloride)  250 mls @ 166.667 mls/hr IVPB Q12 [facility]; 

Protocol


   Last Admin: 02/13/19 08:29 Dose:  166.667 mls/hr


Acetaminophen (Ofirmev)  100 mls @ 400 mls/hr IVPB Q6H [facility]; Protocol


   Stop: 02/13/19 18:01


   Last Admin: 02/13/19 05:04 Dose:  400 mls/hr


Insulin Human Lispro (Humalog)  0 units SC ACHS [facility]; Protocol


   Last Admin: 02/13/19 08:21 Dose:  Not Given


Mometasone Furoate (Asmanex Twisthaler 110 Mcg)  1 puff INH DAILY [facility]


   Last Admin: 02/13/19 08:25 Dose:  1 puff


Morphine Sulfate (Morphine)  2 mg IVP Q4 PRN


   PRN Reason: Pain, moderate (4-7)


   Last Admin: 02/13/19 03:38 Dose:  2 mg


Nicotine (Nicoderm Cq)  1 patch TD DAILY [facility]


   Last Admin: 02/13/19 08:28 Dose:  1 patch


Nifedipine (Procardia Xl)  90 mg PO DAILY [facility]


   Last Admin: 02/13/19 08:27 Dose:  90 mg


Oxycodone HCl (Oxycodone Immediate Release Tab)  30 mg PO QAM [facility]


   Last Admin: 02/13/19 08:22 Dose:  30 mg


Oxycodone/Acetaminophen (Percocet 5/325 Mg Tab)  2 tab PO QPM [facility]


   Stop: 02/15/19 18:01


Pantoprazole Sodium (Protonix Ec Tab)  40 mg PO DAILY [facility]


   Last Admin: 02/13/19 08:28 Dose:  40 mg


Tiotropium Bromide (Spiriva)  18 mcg INH DAILY [facility]


   Last Admin: 02/13/19 08:26 Dose:  18 mcg


Topiramate (Topamax)  100 mg PO BID [facility]


   Last Admin: 02/13/19 08:28 Dose:  100 mg


Trazodone HCl (Desyrel)  200 mg PO HS PRN


   PRN Reason: Insomnia


Zolpidem Tartrate (Ambien)  5 mg PO HS PRN


   PRN Reason: Insomnia


   Last Admin: 02/13/19 00:32 Dose:  5 mg











- Labs


Labs: 


                                        





                                 02/13/19 04:35 





                                 02/13/19 04:35 





                                        











PT  14.7 Seconds (9.8-13.1)  H D 02/12/19  10:04    


 


INR  1.3   02/12/19  10:04    


 


APTT  33.0 Seconds (25.6-37.1)   02/12/19  10:04    














- Constitutional


Appears: In Acute Distress





- Head Exam


Head Exam: ATRAUMATIC, NORMAL INSPECTION, NORMOCEPHALIC





- Eye Exam


Eye Exam: EOMI, Normal appearance, PERRL


Pupil Exam: NORMAL ACCOMODATION, PERRL





- ENT Exam


ENT Exam: Mucous Membranes Moist, Normal Exam





- Neck Exam


Neck Exam: Full ROM, Normal Inspection.  absent: Lymphadenopathy





- Respiratory Exam


Respiratory Exam: Clear to Ausculation Bilateral, NORMAL BREATHING PATTERN





- Cardiovascular Exam


Cardiovascular Exam: REGULAR RHYTHM, +S1, +S2.  absent: Murmur





- GI/Abdominal Exam


GI & Abdominal Exam: Soft, Normal Bowel Sounds.  absent: Tenderness





- Rectal Exam


Rectal Exam: NORMAL INSPECTION





- Extremities Exam


Extremities Exam: Full ROM, Normal Capillary Refill, Tenderness.  absent: Joint 

Swelling, Pedal Edema


Additional comments: 





DRY SURGICAL DRESSING ON R KNEE





- Back Exam


Back Exam: NORMAL INSPECTION





- Neurological Exam


Neurological Exam: Alert, Awake, CN II-XII Intact, Oriented x3





- Psychiatric Exam


Psychiatric exam: Normal Affect, Normal Mood





- Skin


Skin Exam: Dry, Intact, Normal Color, Warm





Assessment and Plan





- Assessment and Plan (Free Text)


Assessment: 





COPD STABLE


S/P R KNEE SURGERY


INFECTED R KNEE


Plan: 





CONTINUE IV ANTIBIOTIC RX AND AEROSOLIZED BRONCHODILATORS


ANALGESICS FOR PAIN

## 2019-02-13 NOTE — CP.PCM.PN
<Lorri Daley - Last Filed: 02/13/19 10:55>





Subjective





- Date & Time of Evaluation


Date of Evaluation: 02/13/19


Time of Evaluation: 10:25





- Subjective


Subjective: 





Patient seen this morning in bedside chair with leg elevated and in immobilizer.

Patient resting comfortably and in NAD. Patient states pain is well controlled 

to the knee at this time. Denies nausea/vomiting/fever/shortness of breath/chest

pain. 





Objective





- Vital Signs/Intake and Output


Vital Signs (last 24 hours): 


                                        











Temp Pulse Resp BP Pulse Ox


 


 97.7 F   66   18   147/85   99 


 


 02/13/19 07:44  02/13/19 07:44  02/13/19 07:44  02/13/19 07:44  02/13/19 07:44








Intake and Output: 


                                        











 02/13/19 02/13/19





 06:59 18:59


 


Intake Total 2050 


 


Output Total 1500 


 


Balance 550 














- Medications


Medications: 


                               Current Medications





Acetaminophen (Tylenol 325mg Tab)  650 mg PO Q4 PRN


   PRN Reason: Fever >100.4 F


Acetaminophen (Tylenol 325mg Tab)  650 mg PO Q4 PRN


   PRN Reason: Pain, Mild (1-3)


Albuterol/Ipratropium (Duoneb 3 Mg/0.5 Mg (3 Ml) Ud)  3 ml INH Q6 [facility]


Alprazolam (Xanax)  1 mg PO BID PRN


   PRN Reason: Anxiety


Aripiprazole (Abilify)  15 mg PO DAILY [facility]


   Last Admin: 02/13/19 08:29 Dose:  15 mg


Atorvastatin Calcium (Lipitor)  40 mg PO HS [facility]


   Last Admin: 02/12/19 21:18 Dose:  40 mg


Bisacodyl (Dulcolax)  10 mg PO DAILY [facility]N


   PRN Reason: Constipation


Cholecalciferol (Vitamin D)  2,000 intlu PO DAILY [facility]


   Last Admin: 02/13/19 08:29 Dose:  2,000 intlu


Docusate Sodium (Colace)  100 mg PO BID [facility]


   Last Admin: 02/13/19 08:27 Dose:  100 mg


Enoxaparin Sodium (Lovenox)  40 mg SC DAILY [facility]; Protocol


   Last Admin: 02/13/19 08:25 Dose:  40 mg


Escitalopram Oxalate (Lexapro)  20 mg PO DAILY [facility]


   Last Admin: 02/13/19 08:28 Dose:  20 mg


Folic Acid (Folic Acid)  1 mg PO DAILY [facility]


   Last Admin: 02/13/19 08:27 Dose:  1 mg


Gabapentin (Neurontin)  400 mg PO TID [facility]


   Last Admin: 02/13/19 08:28 Dose:  400 mg


Cefepime HCl 1 gm/ Sodium (Chloride)  100 mls @ 100 mls/hr IVPB Q8 [facility]; Protocol


   Last Admin: 02/13/19 08:24 Dose:  100 mls/hr


Vancomycin HCl 1 gm/ Sodium (Chloride)  250 mls @ 166.667 mls/hr IVPB Q12 [facility]; 

Protocol


   Last Admin: 02/13/19 08:29 Dose:  166.667 mls/hr


Acetaminophen (Ofirmev)  100 mls @ 400 mls/hr IVPB Q6H [facility]; Protocol


   Stop: 02/13/19 18:01


   Last Admin: 02/13/19 05:04 Dose:  400 mls/hr


Insulin Human Lispro (Humalog)  0 units SC ACHS [facility]; Protocol


   Last Admin: 02/13/19 08:21 Dose:  Not Given


Mometasone Furoate (Asmanex Twisthaler 110 Mcg)  1 puff INH DAILY [facility]


   Last Admin: 02/13/19 08:25 Dose:  1 puff


Morphine Sulfate (Morphine)  2 mg IVP Q4 PRN


   PRN Reason: Pain, moderate (4-7)


   Last Admin: 02/13/19 03:38 Dose:  2 mg


Nicotine (Nicoderm Cq)  1 patch TD DAILY [facility]


   Last Admin: 02/13/19 08:28 Dose:  1 patch


Nifedipine (Procardia Xl)  90 mg PO DAILY [facility]


   Last Admin: 02/13/19 08:27 Dose:  90 mg


Oxycodone HCl (Oxycodone Immediate Release Tab)  30 mg PO QAM [facility]


   Last Admin: 02/13/19 08:22 Dose:  30 mg


Oxycodone/Acetaminophen (Percocet 5/325 Mg Tab)  2 tab PO QPM [facility]


   Stop: 02/15/19 18:01


Pantoprazole Sodium (Protonix Ec Tab)  40 mg PO DAILY [facility]


   Last Admin: 02/13/19 08:28 Dose:  40 mg


Tiotropium Bromide (Spiriva)  18 mcg INH DAILY [facility]


   Last Admin: 02/13/19 08:26 Dose:  18 mcg


Topiramate (Topamax)  100 mg PO BID [facility]


   Last Admin: 02/13/19 08:28 Dose:  100 mg


Trazodone HCl (Desyrel)  200 mg PO HS PRN


   PRN Reason: Insomnia


Zolpidem Tartrate (Ambien)  5 mg PO HS PRN


   PRN Reason: Insomnia


   Last Admin: 02/13/19 00:32 Dose:  5 mg











- Labs


Labs: 


                                        





                                 02/13/19 04:35 





                                 02/13/19 04:35 





                                        











PT  14.7 Seconds (9.8-13.1)  H D 02/12/19  10:04    


 


INR  1.3   02/12/19  10:04    


 


APTT  33.0 Seconds (25.6-37.1)   02/12/19  10:04    














- Constitutional


Appears: Non-toxic, No Acute Distress





- Head Exam


Head Exam: ATRAUMATIC, NORMOCEPHALIC





- ENT Exam


ENT Exam: Mucous Membranes Moist





- Respiratory Exam


Respiratory Exam: NORMAL BREATHING PATTERN





- Cardiovascular Exam


Cardiovascular Exam: REGULAR RHYTHM





- GI/Abdominal Exam


GI & Abdominal Exam: Normal Bowel Sounds.  absent: Tenderness





- Extremities Exam


Extremities Exam: Normal Capillary Refill





- Neurological Exam


Neurological Exam: Alert, Awake, Oriented x3





- Psychiatric Exam


Psychiatric exam: Normal Affect, Normal Mood





- Skin


Skin Exam: Warm





Assessment and Plan





- Assessment and Plan (Free Text)


Assessment: 


58 year old female with history of COPD, asthma, diabetes mellitus type 2, 

hypertension, hyperlipidemia, chronic pain, admitted for right knee wound 

infection, POD# 1 s/p superficial wound I&D, plastics closure 


Plan: 








1. Right Knee Wound


- Ortho consult - Dr. nSow


- Pulm Dr. Singh, cardio Dr. Reinoso consults for clearance; CXR and EKG 

done


- POD# 1 s/p superficial wound I&D, plastics closure


- ID consult - Dr. King, recommending 4 weeks of IV abx to prevent 

prosthesis infection 


- Patient started on Cefepime 1gm Q8, Vanco 1gm Q12 


- Blood cultures; No growth after 24 hrs, wait for final 


- Wound culture; gram + cocci, wait for final 


- PT/OT eval, reccs appreciated 





2. COPD/asthma


- Duoneb Q4 PRN


- Home med asmanex, spiriva





3. Diabetes mellitus type 2


- C/w januvia home med


- Insulin coverage scale & hypoglycemia protocol





4. Hypertension


- Procardia home med


- Monitor on vitals





5. Hyperlipidemia


- Home med atorvastatin





6. Chronic pain


- Home meds  percocet, oxycodone, gabapentin on med rec





7. Anxiety/Depression


- Home meds xanax, abilify, lexapro





8. Insomnia


- Home meds trazodone, ambien





9. Constipation


- Home meds dulcolax, colace 





10. Diet


- Consistent carbohydrate diet





11. GI prophylaxis


- Protonix home med





12. DVT prophylaxis


- SCD


- Lovenox 40 mg SC





<Gregoria June - Last Filed: 02/13/19 18:58>





Objective





- Vital Signs/Intake and Output


Vital Signs (last 24 hours): 


                                        











Temp Pulse Resp BP Pulse Ox


 


 98.2 F   54 L  20   120/62   96 


 


 02/13/19 16:15  02/13/19 16:15  02/13/19 16:15  02/13/19 16:15  02/13/19 16:15








Intake and Output: 


                                        











 02/13/19 02/13/19





 06:59 18:59


 


Intake Total 2050 


 


Output Total 1500 


 


Balance 550 














- Medications


Medications: 


                               Current Medications





Acetaminophen (Tylenol 325mg Tab)  650 mg PO Q4 PRN


   PRN Reason: Fever >100.4 F


Acetaminophen (Tylenol 325mg Tab)  650 mg PO Q4 PRN


   PRN Reason: Pain, Mild (1-3)


Albuterol/Ipratropium (Duoneb 3 Mg/0.5 Mg (3 Ml) Ud)  3 ml INH RQ6 [facility]


   Last Admin: 02/13/19 13:46 Dose:  3 ml


Alprazolam (Xanax)  1 mg PO BID PRN


   PRN Reason: Anxiety


Aripiprazole (Abilify)  15 mg PO DAILY [facility]


Atorvastatin Calcium (Lipitor)  40 mg PO HS [facility]


   Last Admin: 02/12/19 21:18 Dose:  40 mg


Bisacodyl (Dulcolax)  10 mg PO DAILY [facility]


   PRN Reason: Constipation


Cholecalciferol (Vitamin D)  2,000 intlu PO DAILY [facility]


   Last Admin: 02/13/19 08:29 Dose:  2,000 intlu


Docusate Sodium (Colace)  100 mg PO BID [facility]


   Last Admin: 02/13/19 17:50 Dose:  100 mg


Enoxaparin Sodium (Lovenox)  40 mg SC DAILY [facility]; Protocol


   Last Admin: 02/13/19 08:25 Dose:  40 mg


Escitalopram Oxalate (Lexapro)  20 mg PO DAILY [facility]


   Last Admin: 02/13/19 08:28 Dose:  20 mg


Folic Acid (Folic Acid)  1 mg PO DAILY [facility]


   Last Admin: 02/13/19 08:27 Dose:  1 mg


Gabapentin (Neurontin)  400 mg PO TID [facility]


   Last Admin: 02/13/19 17:50 Dose:  400 mg


Cefepime HCl 1 gm/ Sodium (Chloride)  100 mls @ 100 mls/hr IVPB Q8 [facility]; Protocol


   Last Admin: 02/13/19 08:24 Dose:  100 mls/hr


Vancomycin HCl 1 gm/ Sodium (Chloride)  250 mls @ 166.667 mls/hr IVPB Q12 [facility]; 

Protocol


   Last Admin: 02/13/19 08:29 Dose:  166.667 mls/hr


Insulin Human Lispro (Humalog)  0 units SC ACHS [facility]; Protocol


   Last Admin: 02/13/19 17:00 Dose:  Not Given


Mometasone Furoate (Asmanex Twisthaler 110 Mcg)  1 puff INH DAILY [facility]


   Last Admin: 02/13/19 08:25 Dose:  1 puff


Morphine Sulfate (Morphine)  2 mg IVP Q4 PRN


   PRN Reason: Pain, moderate (4-7)


   Last Admin: 02/13/19 03:38 Dose:  2 mg


Nicotine (Nicoderm Cq)  1 patch TD DAILY [facility]


   Last Admin: 02/13/19 08:28 Dose:  1 patch


Nifedipine (Procardia Xl)  90 mg PO DAILY [facility]


   Last Admin: 02/13/19 08:27 Dose:  90 mg


Oxycodone HCl (Oxycodone Immediate Release Tab)  30 mg PO QAM [facility]


   Last Admin: 02/13/19 08:22 Dose:  30 mg


Oxycodone/Acetaminophen (Percocet 5/325 Mg Tab)  2 tab PO QPM [facility]


   Stop: 02/15/19 18:01


Pantoprazole Sodium (Protonix Ec Tab)  40 mg PO DAILY [facility]


   Last Admin: 02/13/19 08:28 Dose:  40 mg


Tiotropium Bromide (Spiriva)  18 mcg INH DAILY [facility]


   Last Admin: 02/13/19 08:26 Dose:  18 mcg


Topiramate (Topamax)  100 mg PO BID [facility]


   Last Admin: 02/13/19 17:50 Dose:  100 mg


Trazodone HCl (Desyrel)  200 mg PO HS PRN


   PRN Reason: Insomnia


Zolpidem Tartrate (Ambien)  5 mg PO HS PRN


   PRN Reason: Insomnia


   Last Admin: 02/13/19 00:32 Dose:  5 mg











- Labs


Labs: 


                                        





                                 02/13/19 04:35 





                                 02/13/19 04:35 





                                        











PT  14.7 Seconds (9.8-13.1)  H D 02/12/19  10:04    


 


INR  1.3   02/12/19  10:04    


 


APTT  33.0 Seconds (25.6-37.1)   02/12/19  10:04    














Attending/Attestation





- Attestation


I have personally seen and examined this patient.: Yes


I have fully participated in the care of the patient.: Yes


I have reviewed all pertinent clinical information, including history, physical 

exam and plan: Yes


Notes (Text): 








Right Knee Drainage , Erythema and Pain/ Slight Dehiscence with  Superficial 

Infection of Right TKR Wound  , History of recent TKR ( Jan 11 )


COPD, chronic


Mild Intermittent Asthma 


HTN


DM Type II


Coagulopathy, resolved prob lab error


- s/p Debridement and Irrigation of Right knee done by Ortho and Plastic Sx


- on IV cefepime and Vanco empirically


- Wound c/s : Gram + Cocci


-cont home meds


- Pulm and cardio consulted for optimization prior to surgery


-ID consult following pt


- DVT proph after surgery


- plan for d/c on IV antibiotics Vanco x 4 wks


- PICC line placed on R arm  by Picc RN ( discussed with Dr Castellanos IR - ok to 

keep PICC line in arm arm despite hx of previous cephalic vein thrombosis in 

2015)


- pt on Xarelto at home

## 2019-02-14 VITALS
SYSTOLIC BLOOD PRESSURE: 109 MMHG | OXYGEN SATURATION: 95 % | DIASTOLIC BLOOD PRESSURE: 69 MMHG | HEART RATE: 58 BPM | TEMPERATURE: 98.9 F | RESPIRATION RATE: 20 BRPM

## 2019-02-14 LAB
BASOPHILS # BLD AUTO: 0.1 K/UL (ref 0–0.2)
BASOPHILS NFR BLD: 1.2 % (ref 0–2)
BUN SERPL-MCNC: 5 MG/DL (ref 7–17)
CALCIUM SERPL-MCNC: 9 MG/DL (ref 8.4–10.2)
EOSINOPHIL # BLD AUTO: 0.2 K/UL (ref 0–0.7)
EOSINOPHIL NFR BLD: 3.8 % (ref 0–4)
ERYTHROCYTE [DISTWIDTH] IN BLOOD BY AUTOMATED COUNT: 16.1 % (ref 11.5–14.5)
GFR NON-AFRICAN AMERICAN: > 60
HGB BLD-MCNC: 11.2 G/DL (ref 12–16)
LYMPHOCYTES # BLD AUTO: 1.8 K/UL (ref 1–4.3)
LYMPHOCYTES NFR BLD AUTO: 40.4 % (ref 20–40)
MCH RBC QN AUTO: 29.6 PG (ref 27–31)
MCHC RBC AUTO-ENTMCNC: 31.7 G/DL (ref 33–37)
MCV RBC AUTO: 93.3 FL (ref 81–99)
MONOCYTES # BLD: 0.4 K/UL (ref 0–0.8)
MONOCYTES NFR BLD: 8.8 % (ref 0–10)
NEUTROPHILS # BLD: 2 K/UL (ref 1.8–7)
NEUTROPHILS NFR BLD AUTO: 45.8 % (ref 50–75)
NRBC BLD AUTO-RTO: 0.1 % (ref 0–0)
PLATELET # BLD: 153 K/UL (ref 130–400)
PMV BLD AUTO: 9.1 FL (ref 7.2–11.7)
RBC # BLD AUTO: 3.78 MIL/UL (ref 3.8–5.2)
WBC # BLD AUTO: 4.5 K/UL (ref 4.8–10.8)

## 2019-02-14 RX ADMIN — INSULIN LISPRO SCH U: 100 INJECTION, SOLUTION INTRAVENOUS; SUBCUTANEOUS at 08:15

## 2019-02-14 RX ADMIN — IPRATROPIUM BROMIDE AND ALBUTEROL SULFATE SCH: .5; 3 SOLUTION RESPIRATORY (INHALATION) at 03:01

## 2019-02-14 RX ADMIN — OXYCODONE HYDROCHLORIDE AND ACETAMINOPHEN SCH TAB: 5; 325 TABLET ORAL at 17:21

## 2019-02-14 RX ADMIN — MOMETASONE FUROATE SCH PUFF: 110 INHALANT RESPIRATORY (INHALATION) at 08:12

## 2019-02-14 RX ADMIN — ENOXAPARIN SODIUM SCH MG: 40 INJECTION SUBCUTANEOUS at 08:13

## 2019-02-14 RX ADMIN — VITAMIN D, TAB 1000IU (100/BT) SCH INTLU: 25 TAB at 08:14

## 2019-02-14 RX ADMIN — IPRATROPIUM BROMIDE AND ALBUTEROL SULFATE SCH ML: .5; 3 SOLUTION RESPIRATORY (INHALATION) at 07:32

## 2019-02-14 RX ADMIN — IPRATROPIUM BROMIDE AND ALBUTEROL SULFATE SCH: .5; 3 SOLUTION RESPIRATORY (INHALATION) at 13:09

## 2019-02-14 RX ADMIN — INSULIN LISPRO SCH: 100 INJECTION, SOLUTION INTRAVENOUS; SUBCUTANEOUS at 16:47

## 2019-02-14 RX ADMIN — IPRATROPIUM BROMIDE AND ALBUTEROL SULFATE SCH: .5; 3 SOLUTION RESPIRATORY (INHALATION) at 19:31

## 2019-02-14 RX ADMIN — PANTOPRAZOLE SODIUM SCH MG: 40 TABLET, DELAYED RELEASE ORAL at 08:14

## 2019-02-14 RX ADMIN — NIFEDIPINE SCH MG: 90 TABLET, FILM COATED, EXTENDED RELEASE ORAL at 08:14

## 2019-02-14 RX ADMIN — TIOTROPIUM BROMIDE SCH MCG: 18 CAPSULE ORAL; RESPIRATORY (INHALATION) at 08:12

## 2019-02-14 RX ADMIN — INSULIN LISPRO SCH: 100 INJECTION, SOLUTION INTRAVENOUS; SUBCUTANEOUS at 12:46

## 2019-02-14 RX ADMIN — OXYCODONE HYDROCHLORIDE SCH MG: 10 TABLET ORAL at 08:10

## 2019-02-14 NOTE — CP.PCM.PN
Subjective





- Date & Time of Evaluation


Date of Evaluation: 02/14/19


Time of Evaluation: 08:00





- Subjective


Subjective: 


Patient seen and examined at bedside comfortable. Pain is well controlled. 

Tolerated PT well, ambulating down hallways with RW. No acute events overnight, 

no new complaints. 








Objective





- Vital Signs/Intake and Output


Vital Signs (last 24 hours): 


                                        











Temp Pulse Resp BP Pulse Ox


 


 97.9 F   63   18   131/79   94 L


 


 02/14/19 07:56  02/14/19 07:56  02/14/19 07:56  02/14/19 07:56  02/14/19 07:56











- Medications


Medications: 


                               Current Medications





Acetaminophen (Tylenol 325mg Tab)  650 mg PO Q4 PRN


   PRN Reason: Fever >100.4 F


Acetaminophen (Tylenol 325mg Tab)  650 mg PO Q4 PRN


   PRN Reason: Pain, Mild (1-3)


Albuterol/Ipratropium (Duoneb 3 Mg/0.5 Mg (3 Ml) Ud)  3 ml INH RQ6 [facility]


   Last Admin: 02/14/19 07:32 Dose:  3 ml


Alprazolam (Xanax)  1 mg PO BID PRN


   PRN Reason: Anxiety


Aripiprazole (Abilify)  15 mg PO DAILY [facility]


   Last Admin: 02/14/19 08:14 Dose:  15 mg


Atorvastatin Calcium (Lipitor)  40 mg PO HS [facility]


   Last Admin: 02/13/19 21:22 Dose:  40 mg


Bisacodyl (Dulcolax)  10 mg PO DAILY [facility]


   PRN Reason: Constipation


Cholecalciferol (Vitamin D)  2,000 intlu PO DAILY [facility]


   Last Admin: 02/14/19 08:14 Dose:  2,000 intlu


Docusate Sodium (Colace)  100 mg PO BID [facility]


   Last Admin: 02/14/19 08:16 Dose:  100 mg


Enoxaparin Sodium (Lovenox)  40 mg SC DAILY [facility]; Protocol


   Last Admin: 02/14/19 08:13 Dose:  40 mg


Escitalopram Oxalate (Lexapro)  20 mg PO DAILY [facility]


   Last Admin: 02/14/19 08:15 Dose:  20 mg


Folic Acid (Folic Acid)  1 mg PO DAILY [facility]


   Last Admin: 02/14/19 08:14 Dose:  1 mg


Gabapentin (Neurontin)  400 mg PO TID [facility]


   Last Admin: 02/14/19 08:14 Dose:  400 mg


Vancomycin HCl 1 gm/ Sodium (Chloride)  250 mls @ 166.667 mls/hr IVPB Q12 [facility]; 

Protocol


   Last Admin: 02/14/19 08:18 Dose:  166.667 mls/hr


Insulin Human Lispro (Humalog)  0 units SC ACHS [facility]; Protocol


   Last Admin: 02/14/19 08:15 Dose:  2 u


Mometasone Furoate (Asmanex Twisthaler 110 Mcg)  1 puff INH DAILY [facility]


   Last Admin: 02/14/19 08:12 Dose:  1 puff


Morphine Sulfate (Morphine)  2 mg IVP Q4 PRN


   PRN Reason: Pain, moderate (4-7)


   Last Admin: 02/14/19 02:02 Dose:  2 mg


Nicotine (Nicoderm Cq)  1 patch TD DAILY [facility]


   Last Admin: 02/14/19 08:14 Dose:  1 patch


Nifedipine (Procardia Xl)  90 mg PO DAILY [facility]


   Last Admin: 02/14/19 08:14 Dose:  90 mg


Oxycodone HCl (Oxycodone Immediate Release Tab)  30 mg PO QAM [facility]


   Last Admin: 02/14/19 08:10 Dose:  30 mg


Oxycodone/Acetaminophen (Percocet 5/325 Mg Tab)  2 tab PO QPM [facility]


   Stop: 02/15/19 18:01


   Last Admin: 02/13/19 19:12 Dose:  2 tab


Pantoprazole Sodium (Protonix Ec Tab)  40 mg PO DAILY [facility]


   Last Admin: 02/14/19 08:14 Dose:  40 mg


Tiotropium Bromide (Spiriva)  18 mcg INH DAILY [facility]


   Last Admin: 02/14/19 08:12 Dose:  18 mcg


Topiramate (Topamax)  100 mg PO BID [facility]


   Last Admin: 02/14/19 08:14 Dose:  100 mg


Trazodone HCl (Desyrel)  200 mg PO HS PRN


   PRN Reason: Insomnia


   Last Admin: 02/13/19 21:23 Dose:  200 mg


Zolpidem Tartrate (Ambien)  5 mg PO HS PRN


   PRN Reason: Insomnia


   Last Admin: 02/13/19 21:22 Dose:  5 mg











- Labs


Labs: 


                                        





                                 02/14/19 04:35 





                                 02/14/19 04:35 





                                        











PT  14.7 Seconds (9.8-13.1)  H D 02/12/19  10:04    


 


INR  1.3   02/12/19  10:04    


 


APTT  33.0 Seconds (25.6-37.1)   02/12/19  10:04    














- Extremities Exam


Additional comments: 


RLE: knee immobilizer in place


Dressings CDI


sensation intact SP/DP/TN


motor intact EHL/FHL/TA/G


pedal pulse inact


calves soft NT b/l








Assessment and Plan


(1) Wound dehiscence, surgical


Assessment & Plan: 


POD#2 s/p superficial wound I&D, plastics closure


-strict knee immobilizer


-PT/OT


-Staph Aureus isolated on 2/3 OR superficial tissue cultures. No growth x 48 hrs

from bedside aspiration synovial fluid. 


-Dressing change this afternoon by Dr. Aguiar


-abx as per ID, PICC placed yesterday


-orthopedically stable for d/c to home


-above d/w Dr. Snow in agreement


Status: Acute

## 2019-02-14 NOTE — OP
PROCEDURE DATE:  02/12/2019



OPERATION PERFORMED:  Right knee exploration, debridement, washout and

complex closure.



SURGEON:  Valeriy Johnston MD



CO-SURGEON:  Dr. Michel Snow.



INDICATION:  This is a 58-year-old female who presented with an infection

overlying a total joint replacement one month prior.  She had multiple

medial comorbidities.  Because of the proximity to the prosthetic and her

comorbidities, it was decided to take her to the operating room for a

formal washout and evaluation of her right knee wound.



DETAILS OF THE PROCEDURE:  Dr. Snow initially prepped and draped the leg

as is normally done for a knee replacement.  The wound was completely

cleaned from the exterior and washed multiple times.  Once this had been

done, the incision was opened up and explored.  It was noted that there

were no deep areas of infection at the opening of the wound and the

infection appeared to be all suprafascial in nature.  The joint capsule

appeared intact and in good condition.  Once this was noted, I evaluated

the soft tissue envelopes surrounding the joint capsule.  It was noted that

there was some necrotic tissue along the edges of the wound.  This was

sharply debrided in all directions and sent for culturing.  Additional swab

cultures were taken of the deep space in this area as well.  Once this was

done, the wound was reevaluated.  It was noted that additional skin and

soft tissue advancement will be necessary to allow for the wound to close. 

Therefore, the prefascial space was developed and undermined using

electrocautery and blunt dissection to allow for advancement of the soft

tissue and skin to the midline.  Once this had been complete, tourniquet

was taken down and some gentle pressure was applied for approximately 5

mins.  After this was done, hemostasis was obtained using electrocautery. 

Once complete, deep tissues were brought together using 2-0 Monocryl suture

in interrupted fashion.  Once done, 3-0 Monocryl was placed into the dermis

and then finally a combination of 2-0 and 3-0 nylon were then used to close

the skin on the knee in interrupted horizontal mattress fashion.  Once this

was done, a bulky dressing was applied along with an Ace and a knee

immobilizer.  The sterile dressing that was placed included Xeroform gauze

over the top of the incision and bacitracin ointment.  The wound itself

will be reevaluated in 2 days.  She will continue IV antibiotics until that

time and cultures will be followed.  The patient was then extubated and

went to recovery room in good condition.  The patient tolerated the

procedure well.  There were no complications during the procedure.  There

was one specimen, right knee wound culture, and there were three additional

swab cultures sent.  She will be readmitted to the hospital.







__________________________________________

Valeriy Aguiar MD





DD:  02/14/2019 9:47:00

DT:  02/14/2019 11:06:12

Job # [id number]

## 2019-02-14 NOTE — CP.PCM.PN
Subjective





- Date & Time of Evaluation


Date of Evaluation: 02/14/19


Time of Evaluation: 17:04





- Subjective


Subjective: 





patient states her wound burns and itches





Objective





- Vital Signs/Intake and Output


Vital Signs (last 24 hours): 


                                        











Temp Pulse Resp BP Pulse Ox


 


 98.3 F   59 L  19   104/62   98 


 


 02/14/19 15:45  02/14/19 15:45  02/14/19 15:45  02/14/19 15:45  02/14/19 15:45











- Medications


Medications: 


                               Current Medications





Acetaminophen (Tylenol 325mg Tab)  650 mg PO Q4 PRN


   PRN Reason: Fever >100.4 F


Acetaminophen (Tylenol 325mg Tab)  650 mg PO Q4 PRN


   PRN Reason: Pain, Mild (1-3)


Albuterol/Ipratropium (Duoneb 3 Mg/0.5 Mg (3 Ml) Ud)  3 ml INH RQ6 [facility]


   Last Admin: 02/14/19 13:09 Dose:  Not Given


Alprazolam (Xanax)  1 mg PO BID PRN


   PRN Reason: Anxiety


Aripiprazole (Abilify)  15 mg PO DAILY [facility]


   Last Admin: 02/14/19 08:14 Dose:  15 mg


Atorvastatin Calcium (Lipitor)  40 mg PO HS [facility]


   Last Admin: 02/13/19 21:22 Dose:  40 mg


Bisacodyl (Dulcolax)  10 mg PO DAILY PRN


   PRN Reason: Constipation


Cholecalciferol (Vitamin D)  2,000 intlu PO DAILY [facility]


   Last Admin: 02/14/19 08:14 Dose:  2,000 intlu


Docusate Sodium (Colace)  100 mg PO BID [facility]


   Last Admin: 02/14/19 08:16 Dose:  100 mg


Enoxaparin Sodium (Lovenox)  40 mg SC DAILY [facility]; Protocol


   Last Admin: 02/14/19 08:13 Dose:  40 mg


Escitalopram Oxalate (Lexapro)  20 mg PO DAILY [facility]


   Last Admin: 02/14/19 08:15 Dose:  20 mg


Folic Acid (Folic Acid)  1 mg PO DAILY [facility]


   Last Admin: 02/14/19 08:14 Dose:  1 mg


Gabapentin (Neurontin)  400 mg PO TID [facility]


   Last Admin: 02/14/19 16:53 Dose:  400 mg


Vancomycin HCl 1,200 mg/ (Sodium Chloride)  250 mls @ 166.667 mls/hr IVPB Q12H 

MAT; Protocol


Insulin Human Lispro (Humalog)  0 units SC ACHS [facility]; Protocol


   Last Admin: 02/14/19 16:47 Dose:  Not Given


Mometasone Furoate (Asmanex Twisthaler 110 Mcg)  1 puff INH DAILY [facility]


   Last Admin: 02/14/19 08:12 Dose:  1 puff


Morphine Sulfate (Morphine)  2 mg IVP Q4 PRN


   PRN Reason: Pain, moderate (4-7)


   Last Admin: 02/14/19 02:02 Dose:  2 mg


Nicotine (Nicoderm Cq)  1 patch TD DAILY [facility]


   Last Admin: 02/14/19 08:14 Dose:  1 patch


Nifedipine (Procardia Xl)  90 mg PO DAILY [facility]


   Last Admin: 02/14/19 08:14 Dose:  90 mg


Oxycodone HCl (Oxycodone Immediate Release Tab)  30 mg PO QAM [facility]


   Last Admin: 02/14/19 08:10 Dose:  30 mg


Oxycodone/Acetaminophen (Percocet 5/325 Mg Tab)  2 tab PO QPM [facility]


   Stop: 02/15/19 18:01


   Last Admin: 02/13/19 19:12 Dose:  2 tab


Pantoprazole Sodium (Protonix Ec Tab)  40 mg PO DAILY [facility]


   Last Admin: 02/14/19 08:14 Dose:  40 mg


Tiotropium Bromide (Spiriva)  18 mcg INH DAILY [facility]


   Last Admin: 02/14/19 08:12 Dose:  18 mcg


Topiramate (Topamax)  100 mg PO BID [facility]


   Last Admin: 02/14/19 16:53 Dose:  100 mg


Trazodone HCl (Desyrel)  200 mg PO HS PRN


   PRN Reason: Insomnia


   Last Admin: 02/13/19 21:23 Dose:  200 mg


Zolpidem Tartrate (Ambien)  5 mg PO HS PRN


   PRN Reason: Insomnia


   Last Admin: 02/13/19 21:22 Dose:  5 mg











- Labs


Labs: 


                                        





                                 02/14/19 04:35 





                                 02/14/19 04:35 





                                        











PT  14.7 Seconds (9.8-13.1)  H D 02/12/19  10:04    


 


INR  1.3   02/12/19  10:04    


 


APTT  33.0 Seconds (25.6-37.1)   02/12/19  10:04    














- Constitutional


Appears: Well, Non-toxic





- Extremities Exam


Additional comments: 





right leg dressing removed. Some erythema at center of incision. No breakdown.





Assessment and Plan


(1) Postoperative wound infection


Status: Acute   





- Assessment and Plan (Free Text)


Assessment: 





right TKA with superficial infection, + MSSA superficial and deep


Plan: 





cont IV ABX


OK to remove knee immobilizer


WBAT


Follow up weekly with me in office once discharged (tuesdays)


Agree w ID recs for ABX

## 2019-02-14 NOTE — CP.PCM.DIS
<ThuyLorri - Last Filed: 02/14/19 10:07>





Provider





- Provider


Date of Admission: 


02/11/19 19:03





Attending physician: 


Gregoria June MD





Primary care physician: 





PMD: Dr. Simmons


Consults: 








02/11/19 19:38


Cardiology Consult Stat 


   Comment: preop


   Consulting Provider: Chacho Reinoso


   Consulting Physician: Chacho Reinoso


   Reason for Consult: pre-op


Pulmonology Consult Stat 


   Comment: preop


   Consulting Provider: Mark Singh


   Consulting Physician: Mark Singh


   Reason for Consult: preop





02/11/19 19:39


Orthopedic Consult Stat 


   Comment: R knee wound


   Consulting Provider: Michel Snow


   Consulting Physician: Michel Snow


   Reason for Consult: R knee wound





02/11/19 19:50


Infectious Disease Consult Routine 


   Comment: 


   Consulting Provider: Esther King


   Consulting Physician: Esther King


   Reason for Consult: Right knee pain, redness discharge, TKR in January 02/12/19 09:00


Case Management Referral Routine 


   Comment: [facility] nurses visit every day


   Physician Instructions: 


   Reason For Exam: needs continued assist at home


   Reason for Referral: Discharge Planning


Nursing Referral for Wound Care Routine 


   Comment: 


   Physician Instructions: 


   Reason For Exam: low nunu scale


Social Work Referral Routine 


   Comment: lives alone


   Physician Instructions: 


   Reason For Exam: lives alone











Time Spent in preparation of Discharge (in minutes): 30





Hospital Course





- Lab Results


Lab Results: 


                                  Micro Results





02/11/19 19:56   Knee - Right   Gram Stain - Final


02/11/19 19:56   Knee - Right   Wound Culture - Final


                            Staphylococcus Aureus


02/12/19 18:00   Knee - Right   Gram Stain - Final


02/12/19 18:00   Knee - Right   Wound Culture - Final


                            Staphylococcus Aureus


02/12/19 18:00   Knee - Right   Gram Stain - Final


02/12/19 18:00   Knee - Right   Wound Culture - Final


                            Staphylococcus Aureus


02/11/19 20:26   Blood-Venous   Blood Culture - Preliminary


                            NO GROWTH AFTER 48 HOURS


02/11/19 19:56   Blood-Venous   Blood Culture - Preliminary


                            NO GROWTH AFTER 48 HOURS


02/12/19 09:00   Synovial Fluid   Gram Stain - Final


02/12/19 09:00   Synovial Fluid   Body Fluid Culture - Preliminary


                            NO GROWTH AFTER 24 HOURS


02/12/19 09:00   Synovial Fluid   Gram Stain - Final


02/12/19 09:00   Synovial Fluid   Body Fluid Culture - Preliminary


                            NO GROWTH AFTER 24 HOURS


02/12/19 09:00   Synovial Fluid   Gram Stain - Final


02/12/19 09:00   Synovial Fluid   Body Fluid Culture - Preliminary


                            NO GROWTH AFTER 24 HOURS


02/12/19 18:00   Knee - Right   Gram Stain - Final


02/12/19 18:00   Knee - Right   Wound Culture - Preliminary


                            NO GROWTH AFTER 24 HOURS





                             Most Recent Lab Values











WBC  4.5 K/uL (4.8-10.8)  L  02/14/19  04:35    


 


RBC  3.78 Mil/uL (3.80-5.20)  L  02/14/19  04:35    


 


Hgb  11.2 g/dL (12.0-16.0)  L  02/14/19  04:35    


 


Hct  35.3 % (34.0-47.0)   02/14/19  04:35    


 


MCV  93.3 fl (81.0-99.0)   02/14/19  04:35    


 


MCH  29.6 pg (27.0-31.0)   02/14/19  04:35    


 


MCHC  31.7 g/dL (33.0-37.0)  L  02/14/19  04:35    


 


RDW  16.1 % (11.5-14.5)  H  02/14/19  04:35    


 


Plt Count  153 K/uL (130-400)   02/14/19  04:35    


 


MPV  9.1 fl (7.2-11.7)   02/14/19  04:35    


 


Neut % (Auto)  45.8 % (50.0-75.0)  L  02/14/19  04:35    


 


Lymph % (Auto)  40.4 % (20.0-40.0)  H  02/14/19  04:35    


 


Mono % (Auto)  8.8 % (0.0-10.0)   02/14/19  04:35    


 


Eos % (Auto)  3.8 % (0.0-4.0)   02/14/19  04:35    


 


Baso % (Auto)  1.2 % (0.0-2.0)   02/14/19  04:35    


 


Neut # (Auto)  2.0 K/uL (1.8-7.0)   02/14/19  04:35    


 


Lymph # (Auto)  1.8 K/uL (1.0-4.3)   02/14/19  04:35    


 


Mono # (Auto)  0.4 K/uL (0.0-0.8)   02/14/19  04:35    


 


Eos # (Auto)  0.2 K/uL (0.0-0.7)   02/14/19  04:35    


 


Baso # (Auto)  0.1 K/uL (0.0-0.2)   02/14/19  04:35    


 


ESR  38 mm/hr (0-30)  H  02/12/19  14:38    


 


PT  14.7 Seconds (9.8-13.1)  H D 02/12/19  10:04    


 


INR  1.3   02/12/19  10:04    


 


APTT  33.0 Seconds (25.6-37.1)   02/12/19  10:04    


 


Sodium  140 mmol/l (132-148)   02/14/19  04:35    


 


Potassium  3.3 MMOL/L (3.6-5.0)  L  02/14/19  04:35    


 


Chloride  105 mmol/L ()   02/14/19  04:35    


 


Carbon Dioxide  27 mmol/L (22-30)   02/14/19  04:35    


 


Anion Gap  11  (10-20)   02/14/19  04:35    


 


BUN  5 mg/dl (7-17)  L  02/14/19  04:35    


 


Creatinine  0.6 mg/dl (0.7-1.2)  L  02/14/19  04:35    


 


Est GFR ( Amer)  > 60   02/14/19  04:35    


 


Est GFR (Non-Af Amer)  > 60   02/14/19  04:35    


 


POC Glucose (mg/dL)  227 mg/dL ()  H  02/14/19  05:23    


 


Random Glucose  88 mg/dL ()   02/14/19  04:35    


 


Calcium  9.0 mg/dL (8.4-10.2)   02/14/19  04:35    


 


Total Bilirubin  0.8 mg/dl (0.2-1.3)   02/11/19  19:21    


 


AST  38 U/L (14-36)  H D 02/11/19  19:21    


 


ALT  13 U/L (9-52)   02/11/19  19:21    


 


Alkaline Phosphatase  105 U/L ()   02/11/19  19:21    


 


Total Protein  8.7 G/DL (6.3-8.2)  H  02/11/19  19:21    


 


Albumin  4.4 g/dL (3.5-5.0)   02/11/19  19:21    


 


Globulin  4.3 gm/dL (2.2-3.9)  H  02/11/19  19:21    


 


Albumin/Globulin Ratio  1.0  (1.0-2.1)   02/11/19  19:21    


 


Urine Color  Yellow  (YELLOW)   02/12/19  04:45    


 


Urine Clarity  Clear  (Clear)   02/12/19  04:45    


 


Urine pH  6.0  (5.0-8.0)   02/12/19  04:45    


 


Ur Specific Gravity  1.006  (1.003-1.030)   02/12/19  04:45    


 


Urine Protein  Negative mg/dL (NEGATIVE)   02/12/19  04:45    


 


Urine Glucose (UA)  Neg mg/dL (NEGATIVE)   02/12/19  04:45    


 


Urine Ketones  Negative mg/dL (NEGATIVE)   02/12/19  04:45    


 


Urine Blood  Negative  (NEGATIVE)   02/12/19  04:45    


 


Urine Nitrate  Negative  (NEGATIVE)   02/12/19  04:45    


 


Urine Bilirubin  Negative  (NEGATIVE)   02/12/19  04:45    


 


Urine Urobilinogen  0.2-1.0 mg/dL (0.2-1.0)   02/12/19  04:45    


 


Ur Leukocyte Esterase  Neg Joanne/uL (Negative)   02/12/19  04:45    


 


Urine RBC (Auto)  < 1 /hpf (0-3)   02/12/19  04:45    


 


Urine Microscopic WBC  < 1 /hpf (0-5)   02/12/19  04:45    


 


Ur Squamous Epith Cells  1 /hpf (0-5)   02/12/19  04:45    


 


Urine Bacteria  Rare  (<OCC)   02/12/19  04:45    


 


Fluid Type  Synovial fluid   02/12/19  09:05    


 


Synovial WBC  164.0 /mm3 (0.0-150.0)  H  02/12/19  09:05    


 


Synovial RBC  50616.0 /mm3 (0.0-0.0)  H  02/12/19  09:05    


 


Synovial Neutrophils  34.0 % (0-0)  H  02/12/19  09:05    


 


Synovial Lymphocytes  39.0 % (0-0)  H  02/12/19  09:05    


 


Synov Monos/Macrophage  27 % (0-0)  H  02/12/19  09:05    


 


Synovial Fluid Comment  Moderately bloody   02/12/19  09:05    


 


Vancomycin Trough  14.0 ug/mL (5.0-10.0)  H  02/14/19  04:35    


 


Blood Type  A POSITIVE   02/11/19  19:21    


 


Antibody Screen  Negative   02/11/19  19:21    


 


BBK History Checked  Patient has bt   02/11/19  19:21    














- Hospital Course


Hospital Course: 





58 year old female with history of COPD, asthma, diabetes mellitus type 2, 

hypertension, hyperlipidemia, chronic pain, admitted for right knee wound 

infection. 


During her hospital course, patient went to OR with Dr. Snow for right knee 

superficial irrigation and debridement with plastics closure. Infectious disease

was consulted and PICC line was placed. Patient to be discharged on 4 weeks of 

IV antibiotics for prevention of prosthetic joint infection. Patient to follow 

up with Dr. Snow upon discharge.








1. Right Knee Wound


- Ortho consult - Dr. Snow


- Pulm Dr. Singh, cardio Dr. Reinoso consults for clearance; CXR and EKG 

done


- POD# 2 s/p superficial wound I&D, plastics closure


- ID consult - Dr. King, recommending 4 weeks of IV abx to prevent 

prosthesis infection 


- Patient started on Cefepime 1gm Q8, Vanco 1gm Q12 


- Blood cultures; No growth after 24 hrs, wait for final 


- Wound culture; staph aureus 


- PT/OT 





- Date & Time of H&P


Date of H&P: 02/14/19


Time of H&P: 09:28





Discharge Exam





- Head Exam


Head Exam: ATRAUMATIC, NORMAL INSPECTION, NORMOCEPHALIC





- Eye Exam


Eye Exam: Normal appearance





- Respiratory Exam


Respiratory Exam: UNREMARKABLE





- Cardiovascular Exam


Cardiovascular Exam: REGULAR RHYTHM





- GI/Abdominal Exam


GI & Abdominal Exam: Normal Bowel Sounds, Soft, Unremarkable





- Extremities Exam


Additional comments: 


R knee immobilizer 





- Neurological Exam


Neurological exam: Alert, Oriented x3





- Psychiatric Exam


Psychiatric exam: Normal Affect, Normal Mood





- Skin


Skin Exam: Warm





Discharge Plan





- Discharge Medications


Prescriptions: 


Vancomycin/0.9 % Sod Chloride [Vanco 1 Gram/150 ml-0.9% NaCl] 1 gm IV Q12 28 

Days  plast..bag





- Follow Up Plan


Condition: FAIR


Disposition: TRANSF TO SNF


Instructions:  Wound Infection, Wound Dehiscence (DC)


Additional Instructions: 


follow up with primary MD 1 week 


keep picc line dressing dry and clean.


weight bearing as tolerated with walker


Wound Care


ff up with Dr Snow in 1 wk


IV Vanco 1 gram q 12 x 4 wks 


Weekly BMP and Vanco trough





Referrals: 


Tian Simmons MD [Family Provider] - 


Michel Snow MD [Staff Provider] - 





<Gregoria June - Last Filed: 02/14/19 16:21>





Provider





- Provider


Date of Admission: 


02/11/19 19:03





Attending physician: 


Gregoria June MD





Consults: 








02/11/19 19:38


Cardiology Consult Stat 


   Comment: preop


   Consulting Provider: Chacho Reinoso


   Consulting Physician: Chacho Reinoso


   Reason for Consult: pre-op


Pulmonology Consult Stat 


   Comment: preop


   Consulting Provider: Mark Singh


   Consulting Physician: Mark Singh


   Reason for Consult: preop





02/11/19 19:39


Orthopedic Consult Stat 


   Comment: R knee wound


   Consulting Provider: Michel Snow


   Consulting Physician: Michel Snow


   Reason for Consult: R knee wound





02/11/19 19:50


Infectious Disease Consult Routine 


   Comment: 


   Consulting Provider: Esther King


   Consulting Physician: Esther King


   Reason for Consult: Right knee pain, redness discharge, TKR in January 02/12/19 09:00


Case Management Referral Routine 


   Comment: [facility] nurses visit every day


   Physician Instructions: 


   Reason For Exam: needs continued assist at home


   Reason for Referral: Discharge Planning


Nursing Referral for Wound Care Routine 


   Comment: 


   Physician Instructions: 


   Reason For Exam: low nunu scale


Social Work Referral Routine 


   Comment: lives alone


   Physician Instructions: 


   Reason For Exam: lives alone














Hospital Course





- Lab Results


Lab Results: 


                                  Micro Results





02/12/19 09:00   Synovial Fluid   Gram Stain - Final


02/12/19 09:00   Synovial Fluid   Body Fluid Culture - Preliminary


                            NO GROWTH AFTER 2 DAYS


02/12/19 09:00   Synovial Fluid   Gram Stain - Final


02/12/19 09:00   Synovial Fluid   Body Fluid Culture - Preliminary


                            NO GROWTH AFTER 2 DAYS


02/12/19 09:00   Synovial Fluid   Gram Stain - Final


02/12/19 09:00   Synovial Fluid   Body Fluid Culture - Final


                            Staphylococcus Aureus


02/12/19 18:00   Knee - Right   Gram Stain - Final


02/12/19 18:00   Knee - Right   Wound Culture - Final


                            Staphylococcus Aureus


02/11/19 19:56   Knee - Right   Gram Stain - Final


02/11/19 19:56   Knee - Right   Wound Culture - Final


                            Staphylococcus Aureus


02/12/19 18:00   Knee - Right   Gram Stain - Final


02/12/19 18:00   Knee - Right   Wound Culture - Final


                            Staphylococcus Aureus


02/12/19 18:00   Knee - Right   Gram Stain - Final


02/12/19 18:00   Knee - Right   Wound Culture - Final


                            Staphylococcus Aureus


02/11/19 20:26   Blood-Venous   Blood Culture - Preliminary


                            NO GROWTH AFTER 48 HOURS


02/11/19 19:56   Blood-Venous   Blood Culture - Preliminary


                            NO GROWTH AFTER 48 HOURS





                             Most Recent Lab Values











WBC  4.5 K/uL (4.8-10.8)  L  02/14/19  04:35    


 


RBC  3.78 Mil/uL (3.80-5.20)  L  02/14/19  04:35    


 


Hgb  11.2 g/dL (12.0-16.0)  L  02/14/19  04:35    


 


Hct  35.3 % (34.0-47.0)   02/14/19  04:35    


 


MCV  93.3 fl (81.0-99.0)   02/14/19  04:35    


 


MCH  29.6 pg (27.0-31.0)   02/14/19  04:35    


 


MCHC  31.7 g/dL (33.0-37.0)  L  02/14/19  04:35    


 


RDW  16.1 % (11.5-14.5)  H  02/14/19  04:35    


 


Plt Count  153 K/uL (130-400)   02/14/19  04:35    


 


MPV  9.1 fl (7.2-11.7)   02/14/19  04:35    


 


Neut % (Auto)  45.8 % (50.0-75.0)  L  02/14/19  04:35    


 


Lymph % (Auto)  40.4 % (20.0-40.0)  H  02/14/19  04:35    


 


Mono % (Auto)  8.8 % (0.0-10.0)   02/14/19  04:35    


 


Eos % (Auto)  3.8 % (0.0-4.0)   02/14/19  04:35    


 


Baso % (Auto)  1.2 % (0.0-2.0)   02/14/19  04:35    


 


Neut # (Auto)  2.0 K/uL (1.8-7.0)   02/14/19  04:35    


 


Lymph # (Auto)  1.8 K/uL (1.0-4.3)   02/14/19  04:35    


 


Mono # (Auto)  0.4 K/uL (0.0-0.8)   02/14/19  04:35    


 


Eos # (Auto)  0.2 K/uL (0.0-0.7)   02/14/19  04:35    


 


Baso # (Auto)  0.1 K/uL (0.0-0.2)   02/14/19  04:35    


 


ESR  38 mm/hr (0-30)  H  02/12/19  14:38    


 


PT  14.7 Seconds (9.8-13.1)  H D 02/12/19  10:04    


 


INR  1.3   02/12/19  10:04    


 


APTT  33.0 Seconds (25.6-37.1)   02/12/19  10:04    


 


Sodium  140 mmol/l (132-148)   02/14/19  04:35    


 


Potassium  3.3 MMOL/L (3.6-5.0)  L  02/14/19  04:35    


 


Chloride  105 mmol/L ()   02/14/19  04:35    


 


Carbon Dioxide  27 mmol/L (22-30)   02/14/19  04:35    


 


Anion Gap  11  (10-20)   02/14/19  04:35    


 


BUN  5 mg/dl (7-17)  L  02/14/19  04:35    


 


Creatinine  0.6 mg/dl (0.7-1.2)  L  02/14/19  04:35    


 


Est GFR ( Amer)  > 60   02/14/19  04:35    


 


Est GFR (Non-Af Amer)  > 60   02/14/19  04:35    


 


POC Glucose (mg/dL)  113 mg/dL ()  H  02/14/19  11:09    


 


Random Glucose  88 mg/dL ()   02/14/19  04:35    


 


Calcium  9.0 mg/dL (8.4-10.2)   02/14/19  04:35    


 


Total Bilirubin  0.8 mg/dl (0.2-1.3)   02/11/19  19:21    


 


AST  38 U/L (14-36)  H D 02/11/19  19:21    


 


ALT  13 U/L (9-52)   02/11/19  19:21    


 


Alkaline Phosphatase  105 U/L ()   02/11/19  19:21    


 


Total Protein  8.7 G/DL (6.3-8.2)  H  02/11/19  19:21    


 


Albumin  4.4 g/dL (3.5-5.0)   02/11/19  19:21    


 


Globulin  4.3 gm/dL (2.2-3.9)  H  02/11/19  19:21    


 


Albumin/Globulin Ratio  1.0  (1.0-2.1)   02/11/19  19:21    


 


Urine Color  Yellow  (YELLOW)   02/12/19  04:45    


 


Urine Clarity  Clear  (Clear)   02/12/19  04:45    


 


Urine pH  6.0  (5.0-8.0)   02/12/19  04:45    


 


Ur Specific Gravity  1.006  (1.003-1.030)   02/12/19  04:45    


 


Urine Protein  Negative mg/dL (NEGATIVE)   02/12/19  04:45    


 


Urine Glucose (UA)  Neg mg/dL (NEGATIVE)   02/12/19  04:45    


 


Urine Ketones  Negative mg/dL (NEGATIVE)   02/12/19  04:45    


 


Urine Blood  Negative  (NEGATIVE)   02/12/19  04:45    


 


Urine Nitrate  Negative  (NEGATIVE)   02/12/19  04:45    


 


Urine Bilirubin  Negative  (NEGATIVE)   02/12/19  04:45    


 


Urine Urobilinogen  0.2-1.0 mg/dL (0.2-1.0)   02/12/19  04:45    


 


Ur Leukocyte Esterase  Neg Joanne/uL (Negative)   02/12/19  04:45    


 


Urine RBC (Auto)  < 1 /hpf (0-3)   02/12/19  04:45    


 


Urine Microscopic WBC  < 1 /hpf (0-5)   02/12/19  04:45    


 


Ur Squamous Epith Cells  1 /hpf (0-5)   02/12/19  04:45    


 


Urine Bacteria  Rare  (<OCC)   02/12/19  04:45    


 


Fluid Type  Synovial fluid   02/12/19  09:05    


 


Synovial WBC  164.0 /mm3 (0.0-150.0)  H  02/12/19  09:05    


 


Synovial RBC  10615.0 /mm3 (0.0-0.0)  H  02/12/19  09:05    


 


Synovial Neutrophils  34.0 % (0-0)  H  02/12/19  09:05    


 


Synovial Lymphocytes  39.0 % (0-0)  H  02/12/19  09:05    


 


Synov Monos/Macrophage  27 % (0-0)  H  02/12/19  09:05    


 


Synovial Fluid Comment  Moderately bloody   02/12/19  09:05    


 


Vancomycin Trough  14.0 ug/mL (5.0-10.0)  H  02/14/19  04:35    


 


Blood Type  A POSITIVE   02/11/19 19:21    


 


Antibody Screen  Negative   02/11/19  19:21    


 


BBK History Checked  Patient has bt   02/11/19  19:21    














Attending/Attestation





- Attestation


I have personally seen and examined this patient.: Yes


I have fully participated in the care of the patient.: Yes


I have reviewed all pertinent clinical information, including history, physical 

exam and plan: Yes


Notes (Text): 








Right Knee Drainage , Erythema and Pain/ Slight Dehiscence with  Superficial 

Infection of Right TKR Wound  , History of recent TKR ( Jan 11 )


COPD, chronic


Mild Intermittent Asthma 


HTN


DM Type II


Coagulopathy, resolved prob lab error


Hx of DVT - on Xarelto





- s/p Debridement and Irrigation of Right knee done by Ortho and Plastic Sx


- on IV cefepime and Vanco empirically started in the hospital


- Wound c/s :MSSA


-cont home meds


- Pulm and cardio consulted for optimization prior to surgery


-ID consulted f


- DVT proph - restart Xarelto


- d/c on IV antibiotics Vanco 1 gram IV  x 4 wks


- PICC line placed on R arm  by Picc RN ( discussed with Dr Castellanos IR - ok to 

keep PICC line in arm arm despite hx of previous cephalic vein thrombosis in 

2015)


- Wound Care


- Physical therapy


- ff up with Dr Simmons  in 1-2 wks


-appt with Dr Snow in 1 wk

## 2019-02-14 NOTE — CON
DATE:  02/14/2019



REASON FOR CONSULTATION:  Right knee wound infection.



HISTORY OF PRESENT ILLNESS:  This is a 58-year-old female with multiple

medical comorbidities that underwent a right total knee replacement

approximately 1 month ago after seeing Dr. Snow in her followup

appointment.  It was noted that the knee wound was red, inflamed, and

seemed to be infected.  She was at that time transferred to [facility] for evaluation and for IV antibiotics.  A joint aspiration was done,

which initially did not show anything on culture and did not have the

normal signs of a joint space infection.  Therefore, a superficial

infection was suspected.  Given the proximity of the infection to the

underlying total knee hardware, plastic surgery consultation was called for

evaluation of the soft tissue.



PAST MEDICAL HISTORY:  Significant for hypertension, diabetes, high

cholesterol, and COPD.  She is an active smoker.  States she had smoked one

cigarette per day for the last couple of years and then a pack per day for

approximately 30 years prior.  She is morbidly obese.



PAST SURGICAL HISTORY:  Significant for a right knee replacement, total

knee, by Dr. Snow approximately one month ago.



MEDICATIONS:  She is currently on broad-spectrum antibiotics along with

multiple other medications.



ALLERGIES:  SHE HAS NO KNOWN ALLERGIES.



REVIEW OF SYSTEMS:  Complete, but did not show any abnormalities other than

those depicted in the history of present illness.



PHYSICAL EXAMINATION:

GENERAL:  She is morbidly obese.  She is alert and oriented to person,

place, and time.

VITAL SIGNS:  Her respiratory rate is normal.

HEART:  Rate is regular and her rhythm is normal as well.

ABDOMEN:  Soft, obese, nontender.

EXTREMITIES:  She has a 20-cm incision along the anterior aspect of the

knee, the superior aspect of which has some scab centrally surrounded by

erythema and edema of the area.  She has palpable pulses distally.  She has

tenderness to palpation on the superior aspect of the knee incision but not

the inferior aspect of the knee incision.  Lower extremity, she has no

pitting edema and no swelling of any sort distal to the area of the

operation.



ASSESSMENT:  This is a 58-year-old female, status post right knee

replacement one month ago now with infection in the right knee.



PLAN:  Given the nature of the injury, the underlying prosthesis and her

medical comorbidities, an aggressive washout and evaluation of the wound is

necessary and will be performed today.







__________________________________________

Valeriy Aguiar MD





DD:  02/14/2019 9:39:52

DT:  02/14/2019 10:33:14

Job # [id number]

## 2019-02-14 NOTE — CP.PCM.PN
Subjective





- Date & Time of Evaluation


Date of Evaluation: 02/14/19


Time of Evaluation: 08:57





- Subjective


Subjective: 





NO CHEST PAINS/SOB


R KNEE PAIN IMPROVING





Objective





- Vital Signs/Intake and Output


Vital Signs (last 24 hours): 


                                        











Temp Pulse Resp BP Pulse Ox


 


 97.9 F   63   18   131/79   94 L


 


 02/14/19 07:56  02/14/19 07:56  02/14/19 07:56  02/14/19 07:56  02/14/19 07:56











- Medications


Medications: 


                               Current Medications





Acetaminophen (Tylenol 325mg Tab)  650 mg PO Q4 PRN


   PRN Reason: Fever >100.4 F


Acetaminophen (Tylenol 325mg Tab)  650 mg PO Q4 PRN


   PRN Reason: Pain, Mild (1-3)


Albuterol/Ipratropium (Duoneb 3 Mg/0.5 Mg (3 Ml) Ud)  3 ml INH RQ6 [facility]


   Last Admin: 02/14/19 07:32 Dose:  3 ml


Alprazolam (Xanax)  1 mg PO BID PRN


   PRN Reason: Anxiety


Aripiprazole (Abilify)  15 mg PO DAILY [facility]


   Last Admin: 02/14/19 08:14 Dose:  15 mg


Atorvastatin Calcium (Lipitor)  40 mg PO HS [facility]


   Last Admin: 02/13/19 21:22 Dose:  40 mg


Bisacodyl (Dulcolax)  10 mg PO DAILY PRN


   PRN Reason: Constipation


Cholecalciferol (Vitamin D)  2,000 intlu PO DAILY [facility]


   Last Admin: 02/14/19 08:14 Dose:  2,000 intlu


Docusate Sodium (Colace)  100 mg PO BID [facility]


   Last Admin: 02/14/19 08:16 Dose:  100 mg


Enoxaparin Sodium (Lovenox)  40 mg SC DAILY [facility]; Protocol


   Last Admin: 02/14/19 08:13 Dose:  40 mg


Escitalopram Oxalate (Lexapro)  20 mg PO DAILY [facility]


   Last Admin: 02/14/19 08:15 Dose:  20 mg


Folic Acid (Folic Acid)  1 mg PO DAILY [facility]


   Last Admin: 02/14/19 08:14 Dose:  1 mg


Gabapentin (Neurontin)  400 mg PO TID [facility]


   Last Admin: 02/14/19 08:14 Dose:  400 mg


Vancomycin HCl 1 gm/ Sodium (Chloride)  250 mls @ 166.667 mls/hr IVPB Q12 [facility]; 

Protocol


   Last Admin: 02/14/19 08:18 Dose:  166.667 mls/hr


Insulin Human Lispro (Humalog)  0 units SC ACHS [facility]; Protocol


   Last Admin: 02/14/19 08:15 Dose:  2 u


Mometasone Furoate (Asmanex Twisthaler 110 Mcg)  1 puff INH DAILY [facility]


   Last Admin: 02/14/19 08:12 Dose:  1 puff


Morphine Sulfate (Morphine)  2 mg IVP Q4 PRN


   PRN Reason: Pain, moderate (4-7)


   Last Admin: 02/14/19 02:02 Dose:  2 mg


Nicotine (Nicoderm Cq)  1 patch TD DAILY [facility]


   Last Admin: 02/14/19 08:14 Dose:  1 patch


Nifedipine (Procardia Xl)  90 mg PO DAILY [facility]


   Last Admin: 02/14/19 08:14 Dose:  90 mg


Oxycodone HCl (Oxycodone Immediate Release Tab)  30 mg PO QAM [facility]


   Last Admin: 02/14/19 08:10 Dose:  30 mg


Oxycodone/Acetaminophen (Percocet 5/325 Mg Tab)  2 tab PO QPM [facility]


   Stop: 02/15/19 18:01


   Last Admin: 02/13/19 19:12 Dose:  2 tab


Pantoprazole Sodium (Protonix Ec Tab)  40 mg PO DAILY [facility]


   Last Admin: 02/14/19 08:14 Dose:  40 mg


Tiotropium Bromide (Spiriva)  18 mcg INH DAILY [facility]


   Last Admin: 02/14/19 08:12 Dose:  18 mcg


Topiramate (Topamax)  100 mg PO BID [facility]


   Last Admin: 02/14/19 08:14 Dose:  100 mg


Trazodone HCl (Desyrel)  200 mg PO HS PRN


   PRN Reason: Insomnia


   Last Admin: 02/13/19 21:23 Dose:  200 mg


Zolpidem Tartrate (Ambien)  5 mg PO HS PRN


   PRN Reason: Insomnia


   Last Admin: 02/13/19 21:22 Dose:  5 mg











- Labs


Labs: 


                                        





                                 02/14/19 04:35 





                                 02/14/19 04:35 





                                        











PT  14.7 Seconds (9.8-13.1)  H D 02/12/19  10:04    


 


INR  1.3   02/12/19  10:04    


 


APTT  33.0 Seconds (25.6-37.1)   02/12/19  10:04    














- Constitutional


Appears: No Acute Distress





- Head Exam


Head Exam: ATRAUMATIC, NORMAL INSPECTION, NORMOCEPHALIC





- Eye Exam


Eye Exam: EOMI, Normal appearance, PERRL


Pupil Exam: NORMAL ACCOMODATION, PERRL





- ENT Exam


ENT Exam: Mucous Membranes Moist, Normal Exam





- Neck Exam


Neck Exam: Full ROM, Normal Inspection.  absent: Lymphadenopathy





- Respiratory Exam


Respiratory Exam: Clear to Ausculation Bilateral, NORMAL BREATHING PATTERN





- Cardiovascular Exam


Cardiovascular Exam: REGULAR RHYTHM, +S1, +S2.  absent: Murmur





- GI/Abdominal Exam


GI & Abdominal Exam: Soft, Normal Bowel Sounds.  absent: Tenderness





- Rectal Exam


Rectal Exam: NORMAL INSPECTION





- Extremities Exam


Extremities Exam: Full ROM, Normal Capillary Refill.  absent: Joint Swelling, 

Pedal Edema


Additional comments: 





R KNEE IN A SPLINT





- Back Exam


Back Exam: NORMAL INSPECTION





- Neurological Exam


Neurological Exam: Alert, Awake, CN II-XII Intact, Normal Gait, Oriented x3





- Psychiatric Exam


Psychiatric exam: Normal Affect, Normal Mood





- Skin


Skin Exam: Dry, Intact, Normal Color, Warm





Assessment and Plan





- Assessment and Plan (Free Text)


Assessment: 





SEPTIC ARTHRITIS R KNEE--STAPH


COPD--STABLE


Plan: 





CONTINUE CURRENT RX


WILL FOLLOW WITH YOU

## 2019-02-14 NOTE — CP.PCM.PN
Subjective





- Date & Time of Evaluation


Date of Evaluation: 02/14/19


Time of Evaluation: 13:00





- Subjective


Subjective: 





ID note-





Patient seen and examined today.





pt. denies any fever or chills.





c/o pain in the knee still .





being d/c today by ortho.























Objective





- Vital Signs/Intake and Output


Vital Signs (last 24 hours): 


                                        











Temp Pulse Resp BP Pulse Ox


 


 98.0 F   65   18   117/78   95 


 


 02/14/19 12:19  02/14/19 12:19  02/14/19 12:19  02/14/19 12:19 02/14/19 12:19











- Medications


Medications: 


                               Current Medications





Acetaminophen (Tylenol 325mg Tab)  650 mg PO Q4 PRN


   PRN Reason: Fever >100.4 F


Acetaminophen (Tylenol 325mg Tab)  650 mg PO Q4 PRN


   PRN Reason: Pain, Mild (1-3)


Albuterol/Ipratropium (Duoneb 3 Mg/0.5 Mg (3 Ml) Ud)  3 ml INH RQ6 [facility]


   Last Admin: 02/14/19 13:09 Dose:  Not Given


Alprazolam (Xanax)  1 mg PO BID PRN


   PRN Reason: Anxiety


Aripiprazole (Abilify)  15 mg PO DAILY [facility]


   Last Admin: 02/14/19 08:14 Dose:  15 mg


Atorvastatin Calcium (Lipitor)  40 mg PO HS [facility]


   Last Admin: 02/13/19 21:22 Dose:  40 mg


Bisacodyl (Dulcolax)  10 mg PO DAILY PRN


   PRN Reason: Constipation


Cholecalciferol (Vitamin D)  2,000 intlu PO DAILY [facility]


   Last Admin: 02/14/19 08:14 Dose:  2,000 intlu


Docusate Sodium (Colace)  100 mg PO BID [facility]


   Last Admin: 02/14/19 08:16 Dose:  100 mg


Enoxaparin Sodium (Lovenox)  40 mg SC DAILY [facility]; Protocol


   Last Admin: 02/14/19 08:13 Dose:  40 mg


Escitalopram Oxalate (Lexapro)  20 mg PO DAILY [facility]


   Last Admin: 02/14/19 08:15 Dose:  20 mg


Folic Acid (Folic Acid)  1 mg PO DAILY [facility]


   Last Admin: 02/14/19 08:14 Dose:  1 mg


Gabapentin (Neurontin)  400 mg PO TID [facility]


   Last Admin: 02/14/19 12:45 Dose:  400 mg


Vancomycin HCl 1 gm/ Sodium (Chloride)  250 mls @ 166.667 mls/hr IVPB Q12 [facility]; 

Protocol


   Last Admin: 02/14/19 08:18 Dose:  166.667 mls/hr


Insulin Human Lispro (Humalog)  0 units SC ACHS [facility]; Protocol


   Last Admin: 02/14/19 12:46 Dose:  Not Given


Mometasone Furoate (Asmanex Twisthaler 110 Mcg)  1 puff INH DAILY [facility]


   Last Admin: 02/14/19 08:12 Dose:  1 puff


Morphine Sulfate (Morphine)  2 mg IVP Q4 PRN


   PRN Reason: Pain, moderate (4-7)


   Last Admin: 02/14/19 02:02 Dose:  2 mg


Nicotine (Nicoderm Cq)  1 patch TD DAILY [facility]


   Last Admin: 02/14/19 08:14 Dose:  1 patch


Nifedipine (Procardia Xl)  90 mg PO DAILY [facility]


   Last Admin: 02/14/19 08:14 Dose:  90 mg


Oxycodone HCl (Oxycodone Immediate Release Tab)  30 mg PO QAM [facility]


   Last Admin: 02/14/19 08:10 Dose:  30 mg


Oxycodone/Acetaminophen (Percocet 5/325 Mg Tab)  2 tab PO QPM [facility]


   Stop: 02/15/19 18:01


   Last Admin: 02/13/19 19:12 Dose:  2 tab


Pantoprazole Sodium (Protonix Ec Tab)  40 mg PO DAILY [facility]


   Last Admin: 02/14/19 08:14 Dose:  40 mg


Tiotropium Bromide (Spiriva)  18 mcg INH DAILY [facility]


   Last Admin: 02/14/19 08:12 Dose:  18 mcg


Topiramate (Topamax)  100 mg PO BID [facility]


   Last Admin: 02/14/19 08:14 Dose:  100 mg


Trazodone HCl (Desyrel)  200 mg PO HS PRN


   PRN Reason: Insomnia


   Last Admin: 02/13/19 21:23 Dose:  200 mg


Zolpidem Tartrate (Ambien)  5 mg PO HS PRN


   PRN Reason: Insomnia


   Last Admin: 02/13/19 21:22 Dose:  5 mg











- Labs


Labs: 


                                        





                                        








- Additional Findings


Additional findings: 








- Constitutional


Appears: No Acute Distress





- Head Exam


Head Exam: ATRAUMATIC





- Eye Exam


Eye Exam: EOMI, PERRL





- ENT Exam


ENT Exam: Normal Oropharynx





- Neck Exam


Neck exam: Positive for: Full Rom





- Respiratory Exam


Respiratory Exam: Clear to Auscultation Bilateral, NORMAL BREATHING PATTERN





- Cardiovascular Exam


Cardiovascular Exam: RRR, +S1, +S2





- GI/Abdominal Exam


GI & Abdominal Exam: Normal Bowel Sounds, Soft


Additional comments: 





NT, ND





- Extremities Exam


Additional comments: 





Right anterior knee region s/p surgical I and D and now has sutures in place


no discharge seen now but still has erythema in the region but less than 

yesterday





- Neurological Exam


Neurological exam: Alert, Oriented x 3





                         Laboratory Results - last 72 hr











  02/11/19 02/11/19 02/11/19





  19:21 19:21 19:21


 


WBC  5.8  


 


RBC  4.79  


 


Hgb  14.5  D  


 


Hct  44.8  


 


MCV  93.5  


 


MCH  30.3  


 


MCHC  32.4 L  


 


RDW  16.0 H  


 


Plt Count  182  


 


MPV  8.7  


 


Neut % (Auto)  56.7  


 


Lymph % (Auto)  32.6  


 


Mono % (Auto)  7.6  


 


Eos % (Auto)  3.0  


 


Baso % (Auto)  0.1  


 


Neut # (Auto)  3.3  


 


Lymph # (Auto)  1.9  


 


Mono # (Auto)  0.4  


 


Eos # (Auto)  0.2  


 


Baso # (Auto)  0.0  


 


ESR   


 


PT    25.7 H


 


INR    2.3


 


APTT    46.5 H


 


Sodium   134 


 


Potassium   5.5 H 


 


Chloride   95 L 


 


Carbon Dioxide   25 


 


Anion Gap   20 


 


BUN   9 


 


Creatinine   0.8 


 


Est GFR ( Amer)   > 60 


 


Est GFR (Non-Af Amer)   > 60 


 


POC Glucose (mg/dL)   


 


Random Glucose   106 H 


 


Calcium   9.6 


 


Total Bilirubin   0.8 


 


AST   38 H D 


 


ALT   13 


 


Alkaline Phosphatase   105 


 


Total Protein   8.7 H 


 


Albumin   4.4 


 


Globulin   4.3 H 


 


Albumin/Globulin Ratio   1.0 


 


Urine Color   


 


Urine Clarity   


 


Urine pH   


 


Ur Specific Gravity   


 


Urine Protein   


 


Urine Glucose (UA)   


 


Urine Ketones   


 


Urine Blood   


 


Urine Nitrate   


 


Urine Bilirubin   


 


Urine Urobilinogen   


 


Ur Leukocyte Esterase   


 


Urine RBC (Auto)   


 


Urine Microscopic WBC   


 


Ur Squamous Epith Cells   


 


Urine Bacteria   


 


Fluid Type   


 


Synovial WBC   


 


Synovial RBC   


 


Synovial Neutrophils   


 


Synovial Lymphocytes   


 


Synov Monos/Macrophage   


 


Synovial Fluid Comment   


 


Vancomycin Trough   


 


Blood Type   


 


Antibody Screen   


 


BBK History Checked   














  02/11/19 02/11/19 02/12/19





  19:21 22:22 04:45


 


WBC   


 


RBC   


 


Hgb   


 


Hct   


 


MCV   


 


MCH   


 


MCHC   


 


RDW   


 


Plt Count   


 


MPV   


 


Neut % (Auto)   


 


Lymph % (Auto)   


 


Mono % (Auto)   


 


Eos % (Auto)   


 


Baso % (Auto)   


 


Neut # (Auto)   


 


Lymph # (Auto)   


 


Mono # (Auto)   


 


Eos # (Auto)   


 


Baso # (Auto)   


 


ESR   


 


PT   


 


INR   


 


APTT   


 


Sodium   


 


Potassium   


 


Chloride   


 


Carbon Dioxide   


 


Anion Gap   


 


BUN   


 


Creatinine   


 


Est GFR ( Amer)   


 


Est GFR (Non-Af Amer)   


 


POC Glucose (mg/dL)   98 


 


Random Glucose   


 


Calcium   


 


Total Bilirubin   


 


AST   


 


ALT   


 


Alkaline Phosphatase   


 


Total Protein   


 


Albumin   


 


Globulin   


 


Albumin/Globulin Ratio   


 


Urine Color    Yellow


 


Urine Clarity    Clear


 


Urine pH    6.0


 


Ur Specific Gravity    1.006


 


Urine Protein    Negative


 


Urine Glucose (UA)    Neg


 


Urine Ketones    Negative


 


Urine Blood    Negative


 


Urine Nitrate    Negative


 


Urine Bilirubin    Negative


 


Urine Urobilinogen    0.2-1.0


 


Ur Leukocyte Esterase    Neg


 


Urine RBC (Auto)    < 1


 


Urine Microscopic WBC    < 1


 


Ur Squamous Epith Cells    1


 


Urine Bacteria    Rare


 


Fluid Type   


 


Synovial WBC   


 


Synovial RBC   


 


Synovial Neutrophils   


 


Synovial Lymphocytes   


 


Synov Monos/Macrophage   


 


Synovial Fluid Comment   


 


Vancomycin Trough   


 


Blood Type  A POSITIVE  


 


Antibody Screen  Negative  


 


BBK History Checked  Patient has bt  














  02/12/19 02/12/19 02/12/19





  05:40 05:40 05:40


 


WBC  4.9  


 


RBC  4.31  


 


Hgb  13.0  


 


Hct  39.8  


 


MCV  92.4  


 


MCH  30.1  


 


MCHC  32.6 L  


 


RDW  16.2 H  


 


Plt Count  178  


 


MPV  9.1  


 


Neut % (Auto)  54.8  


 


Lymph % (Auto)  31.0  


 


Mono % (Auto)  9.4  


 


Eos % (Auto)  3.7  


 


Baso % (Auto)  1.1  


 


Neut # (Auto)  2.7  


 


Lymph # (Auto)  1.5  


 


Mono # (Auto)  0.5  


 


Eos # (Auto)  0.2  


 


Baso # (Auto)  0.1  


 


ESR   


 


PT   


 


INR   


 


APTT   


 


Sodium   137 


 


Potassium   3.9 


 


Chloride   101 


 


Carbon Dioxide   25 


 


Anion Gap   15 


 


BUN   7 


 


Creatinine   0.7 


 


Est GFR ( Amer)   > 60 


 


Est GFR (Non-Af Amer)   > 60 


 


POC Glucose (mg/dL)    121 H


 


Random Glucose   100 


 


Calcium   9.6 


 


Total Bilirubin   


 


AST   


 


ALT   


 


Alkaline Phosphatase   


 


Total Protein   


 


Albumin   


 


Globulin   


 


Albumin/Globulin Ratio   


 


Urine Color   


 


Urine Clarity   


 


Urine pH   


 


Ur Specific Gravity   


 


Urine Protein   


 


Urine Glucose (UA)   


 


Urine Ketones   


 


Urine Blood   


 


Urine Nitrate   


 


Urine Bilirubin   


 


Urine Urobilinogen   


 


Ur Leukocyte Esterase   


 


Urine RBC (Auto)   


 


Urine Microscopic WBC   


 


Ur Squamous Epith Cells   


 


Urine Bacteria   


 


Fluid Type   


 


Synovial WBC   


 


Synovial RBC   


 


Synovial Neutrophils   


 


Synovial Lymphocytes   


 


Synov Monos/Macrophage   


 


Synovial Fluid Comment   


 


Vancomycin Trough   


 


Blood Type   


 


Antibody Screen   


 


BBK History Checked   














  02/12/19 02/12/19 02/12/19





  09:05 10:04 11:14


 


WBC   


 


RBC   


 


Hgb   


 


Hct   


 


MCV   


 


MCH   


 


MCHC   


 


RDW   


 


Plt Count   


 


MPV   


 


Neut % (Auto)   


 


Lymph % (Auto)   


 


Mono % (Auto)   


 


Eos % (Auto)   


 


Baso % (Auto)   


 


Neut # (Auto)   


 


Lymph # (Auto)   


 


Mono # (Auto)   


 


Eos # (Auto)   


 


Baso # (Auto)   


 


ESR   


 


PT   14.7 H D 


 


INR   1.3 


 


APTT   33.0 


 


Sodium   


 


Potassium   


 


Chloride   


 


Carbon Dioxide   


 


Anion Gap   


 


BUN   


 


Creatinine   


 


Est GFR ( Amer)   


 


Est GFR (Non-Af Amer)   


 


POC Glucose (mg/dL)    94


 


Random Glucose   


 


Calcium   


 


Total Bilirubin   


 


AST   


 


ALT   


 


Alkaline Phosphatase   


 


Total Protein   


 


Albumin   


 


Globulin   


 


Albumin/Globulin Ratio   


 


Urine Color   


 


Urine Clarity   


 


Urine pH   


 


Ur Specific Gravity   


 


Urine Protein   


 


Urine Glucose (UA)   


 


Urine Ketones   


 


Urine Blood   


 


Urine Nitrate   


 


Urine Bilirubin   


 


Urine Urobilinogen   


 


Ur Leukocyte Esterase   


 


Urine RBC (Auto)   


 


Urine Microscopic WBC   


 


Ur Squamous Epith Cells   


 


Urine Bacteria   


 


Fluid Type  Synovial fluid  


 


Synovial WBC  164.0 H  


 


Synovial RBC  38735.0 H  


 


Synovial Neutrophils  34.0 H  


 


Synovial Lymphocytes  39.0 H  


 


Synov Monos/Macrophage  27 H  


 


Synovial Fluid Comment  Moderately bloody  


 


Vancomycin Trough   


 


Blood Type   


 


Antibody Screen   


 


BBK History Checked   














  02/12/19 02/12/19 02/12/19





  14:38 16:05 18:08


 


WBC   


 


RBC   


 


Hgb   


 


Hct   


 


MCV   


 


MCH   


 


MCHC   


 


RDW   


 


Plt Count   


 


MPV   


 


Neut % (Auto)   


 


Lymph % (Auto)   


 


Mono % (Auto)   


 


Eos % (Auto)   


 


Baso % (Auto)   


 


Neut # (Auto)   


 


Lymph # (Auto)   


 


Mono # (Auto)   


 


Eos # (Auto)   


 


Baso # (Auto)   


 


ESR  38 H  


 


PT   


 


INR   


 


APTT   


 


Sodium   


 


Potassium   


 


Chloride   


 


Carbon Dioxide   


 


Anion Gap   


 


BUN   


 


Creatinine   


 


Est GFR ( Amer)   


 


Est GFR (Non-Af Amer)   


 


POC Glucose (mg/dL)   99  151 H


 


Random Glucose   


 


Calcium   


 


Total Bilirubin   


 


AST   


 


ALT   


 


Alkaline Phosphatase   


 


Total Protein   


 


Albumin   


 


Globulin   


 


Albumin/Globulin Ratio   


 


Urine Color   


 


Urine Clarity   


 


Urine pH   


 


Ur Specific Gravity   


 


Urine Protein   


 


Urine Glucose (UA)   


 


Urine Ketones   


 


Urine Blood   


 


Urine Nitrate   


 


Urine Bilirubin   


 


Urine Urobilinogen   


 


Ur Leukocyte Esterase   


 


Urine RBC (Auto)   


 


Urine Microscopic WBC   


 


Ur Squamous Epith Cells   


 


Urine Bacteria   


 


Fluid Type   


 


Synovial WBC   


 


Synovial RBC   


 


Synovial Neutrophils   


 


Synovial Lymphocytes   


 


Synov Monos/Macrophage   


 


Synovial Fluid Comment   


 


Vancomycin Trough   


 


Blood Type   


 


Antibody Screen   


 


BBK History Checked   














  02/12/19 02/13/19 02/13/19





  21:19 04:35 04:35


 


WBC   6.5 


 


RBC   4.06 


 


Hgb   12.3 


 


Hct   38.0 


 


MCV   93.5 


 


MCH   30.2 


 


MCHC   32.3 L 


 


RDW   16.3 H 


 


Plt Count   157 


 


MPV   9.2 


 


Neut % (Auto)   65.7 


 


Lymph % (Auto)   22.6 


 


Mono % (Auto)   8.7 


 


Eos % (Auto)   1.6 


 


Baso % (Auto)   1.4 


 


Neut # (Auto)   4.2 


 


Lymph # (Auto)   1.5 


 


Mono # (Auto)   0.6 


 


Eos # (Auto)   0.1 


 


Baso # (Auto)   0.1 


 


ESR   


 


PT   


 


INR   


 


APTT   


 


Sodium    137


 


Potassium    3.8


 


Chloride    105


 


Carbon Dioxide    23


 


Anion Gap    13


 


BUN    6 L


 


Creatinine    0.6 L


 


Est GFR ( Amer)    > 60


 


Est GFR (Non-Af Amer)    > 60


 


POC Glucose (mg/dL)  107  


 


Random Glucose    103


 


Calcium    9.0


 


Total Bilirubin   


 


AST   


 


ALT   


 


Alkaline Phosphatase   


 


Total Protein   


 


Albumin   


 


Globulin   


 


Albumin/Globulin Ratio   


 


Urine Color   


 


Urine Clarity   


 


Urine pH   


 


Ur Specific Gravity   


 


Urine Protein   


 


Urine Glucose (UA)   


 


Urine Ketones   


 


Urine Blood   


 


Urine Nitrate   


 


Urine Bilirubin   


 


Urine Urobilinogen   


 


Ur Leukocyte Esterase   


 


Urine RBC (Auto)   


 


Urine Microscopic WBC   


 


Ur Squamous Epith Cells   


 


Urine Bacteria   


 


Fluid Type   


 


Synovial WBC   


 


Synovial RBC   


 


Synovial Neutrophils   


 


Synovial Lymphocytes   


 


Synov Monos/Macrophage   


 


Synovial Fluid Comment   


 


Vancomycin Trough   


 


Blood Type   


 


Antibody Screen   


 


BBK History Checked   














  02/13/19 02/13/19 02/13/19





  05:21 11:33 16:06


 


WBC   


 


RBC   


 


Hgb   


 


Hct   


 


MCV   


 


MCH   


 


MCHC   


 


RDW   


 


Plt Count   


 


MPV   


 


Neut % (Auto)   


 


Lymph % (Auto)   


 


Mono % (Auto)   


 


Eos % (Auto)   


 


Baso % (Auto)   


 


Neut # (Auto)   


 


Lymph # (Auto)   


 


Mono # (Auto)   


 


Eos # (Auto)   


 


Baso # (Auto)   


 


ESR   


 


PT   


 


INR   


 


APTT   


 


Sodium   


 


Potassium   


 


Chloride   


 


Carbon Dioxide   


 


Anion Gap   


 


BUN   


 


Creatinine   


 


Est GFR ( Amer)   


 


Est GFR (Non-Af Amer)   


 


POC Glucose (mg/dL)  107  132 H  110


 


Random Glucose   


 


Calcium   


 


Total Bilirubin   


 


AST   


 


ALT   


 


Alkaline Phosphatase   


 


Total Protein   


 


Albumin   


 


Globulin   


 


Albumin/Globulin Ratio   


 


Urine Color   


 


Urine Clarity   


 


Urine pH   


 


Ur Specific Gravity   


 


Urine Protein   


 


Urine Glucose (UA)   


 


Urine Ketones   


 


Urine Blood   


 


Urine Nitrate   


 


Urine Bilirubin   


 


Urine Urobilinogen   


 


Ur Leukocyte Esterase   


 


Urine RBC (Auto)   


 


Urine Microscopic WBC   


 


Ur Squamous Epith Cells   


 


Urine Bacteria   


 


Fluid Type   


 


Synovial WBC   


 


Synovial RBC   


 


Synovial Neutrophils   


 


Synovial Lymphocytes   


 


Synov Monos/Macrophage   


 


Synovial Fluid Comment   


 


Vancomycin Trough   


 


Blood Type   


 


Antibody Screen   


 


BBK History Checked   














  02/13/19 02/14/19 02/14/19





  21:32 04:35 04:35


 


WBC   4.5 L 


 


RBC   3.78 L 


 


Hgb   11.2 L 


 


Hct   35.3 


 


MCV   93.3 


 


MCH   29.6 


 


MCHC   31.7 L 


 


RDW   16.1 H 


 


Plt Count   153 


 


MPV   9.1 


 


Neut % (Auto)   45.8 L 


 


Lymph % (Auto)   40.4 H 


 


Mono % (Auto)   8.8 


 


Eos % (Auto)   3.8 


 


Baso % (Auto)   1.2 


 


Neut # (Auto)   2.0 


 


Lymph # (Auto)   1.8 


 


Mono # (Auto)   0.4 


 


Eos # (Auto)   0.2 


 


Baso # (Auto)   0.1 


 


ESR   


 


PT   


 


INR   


 


APTT   


 


Sodium    140


 


Potassium    3.3 L


 


Chloride    105


 


Carbon Dioxide    27


 


Anion Gap    11


 


BUN    5 L


 


Creatinine    0.6 L


 


Est GFR ( Amer)    > 60


 


Est GFR (Non-Af Amer)    > 60


 


POC Glucose (mg/dL)  111 H  


 


Random Glucose    88


 


Calcium    9.0


 


Total Bilirubin   


 


AST   


 


ALT   


 


Alkaline Phosphatase   


 


Total Protein   


 


Albumin   


 


Globulin   


 


Albumin/Globulin Ratio   


 


Urine Color   


 


Urine Clarity   


 


Urine pH   


 


Ur Specific Gravity   


 


Urine Protein   


 


Urine Glucose (UA)   


 


Urine Ketones   


 


Urine Blood   


 


Urine Nitrate   


 


Urine Bilirubin   


 


Urine Urobilinogen   


 


Ur Leukocyte Esterase   


 


Urine RBC (Auto)   


 


Urine Microscopic WBC   


 


Ur Squamous Epith Cells   


 


Urine Bacteria   


 


Fluid Type   


 


Synovial WBC   


 


Synovial RBC   


 


Synovial Neutrophils   


 


Synovial Lymphocytes   


 


Synov Monos/Macrophage   


 


Synovial Fluid Comment   


 


Vancomycin Trough   


 


Blood Type   


 


Antibody Screen   


 


BBK History Checked   














  02/14/19 02/14/19 02/14/19





  04:35 05:23 11:09


 


WBC   


 


RBC   


 


Hgb   


 


Hct   


 


MCV   


 


MCH   


 


MCHC   


 


RDW   


 


Plt Count   


 


MPV   


 


Neut % (Auto)   


 


Lymph % (Auto)   


 


Mono % (Auto)   


 


Eos % (Auto)   


 


Baso % (Auto)   


 


Neut # (Auto)   


 


Lymph # (Auto)   


 


Mono # (Auto)   


 


Eos # (Auto)   


 


Baso # (Auto)   


 


ESR   


 


PT   


 


INR   


 


APTT   


 


Sodium   


 


Potassium   


 


Chloride   


 


Carbon Dioxide   


 


Anion Gap   


 


BUN   


 


Creatinine   


 


Est GFR ( Amer)   


 


Est GFR (Non-Af Amer)   


 


POC Glucose (mg/dL)   227 H  113 H


 


Random Glucose   


 


Calcium   


 


Total Bilirubin   


 


AST   


 


ALT   


 


Alkaline Phosphatase   


 


Total Protein   


 


Albumin   


 


Globulin   


 


Albumin/Globulin Ratio   


 


Urine Color   


 


Urine Clarity   


 


Urine pH   


 


Ur Specific Gravity   


 


Urine Protein   


 


Urine Glucose (UA)   


 


Urine Ketones   


 


Urine Blood   


 


Urine Nitrate   


 


Urine Bilirubin   


 


Urine Urobilinogen   


 


Ur Leukocyte Esterase   


 


Urine RBC (Auto)   


 


Urine Microscopic WBC   


 


Ur Squamous Epith Cells   


 


Urine Bacteria   


 


Fluid Type   


 


Synovial WBC   


 


Synovial RBC   


 


Synovial Neutrophils   


 


Synovial Lymphocytes   


 


Synov Monos/Macrophage   


 


Synovial Fluid Comment   


 


Vancomycin Trough  14.0 H  


 


Blood Type   


 


Antibody Screen   


 


BBK History Checked   

















Assessment and Plan


(1) Postoperative wound infection


Status: Acute   





(2) COPD (chronic obstructive pulmonary disease)


Status: Acute   





(3) Diabetes mellitus type 2 in obese


Status: Acute   





- Assessment and Plan (Free Text)


Assessment: 





A/P-


58 year old female with multiple medical conditions including DM II, HTN, COPD 

admitted with right knee wound site infection post TKR a month ago.





afebrile


normal wbc count


synovial fluid cell count not c/w septic joint


synovial fluic cx- m- MSSA x 2


however right knee OR wound cx- MSSA x 3


Blood cx- neg x 1





Plan-


advise to continue with IV vancomycin  day #3 for MSSA prosthetic  knee 

infection .


keep vanco trough <15.


advise 6 weeks of IV vancomycin 15 mg/kg BID for treatment of prosthetic knee 

MSSA infection.


monitor renal function and hearing while on vancomycin.


monitor ESR level once a week as well.





advise patient to have  close f/u with her ortho doctor  and if patient develops

any fever or no improvement while on IV abx then would need to have prosthesis  

removal at that time and few weeks of IV abx till joint space is sterilized .








all above d/w patient at length and she verbalizes full understanding of all 

above and agrees with above plan of care.

## 2019-02-21 NOTE — CP.PCM.PCO
PHYSICIAN QUERY:

Debridement is documented in the Medical Record. Please specify the type of 
debridement to include the method and instruments used. 



Such as:

-- Excisional

-- Nonexcisional



PHYSICIAN RESPONSE:



Debridement: Excisional debridement

Instrument: Scalpel 

Tissue Removed: Skin, soft tissue



MTDD

## 2019-03-17 ENCOUNTER — HOSPITAL ENCOUNTER (INPATIENT)
Dept: HOSPITAL 14 - H.ER | Age: 59
LOS: 3 days | Discharge: HOME HEALTH | DRG: 190 | End: 2019-03-20
Attending: INTERNAL MEDICINE | Admitting: INTERNAL MEDICINE
Payer: MEDICARE

## 2019-03-17 VITALS — BODY MASS INDEX: 34.4 KG/M2

## 2019-03-17 DIAGNOSIS — Z87.81: ICD-10-CM

## 2019-03-17 DIAGNOSIS — K29.50: ICD-10-CM

## 2019-03-17 DIAGNOSIS — E11.65: ICD-10-CM

## 2019-03-17 DIAGNOSIS — J44.0: ICD-10-CM

## 2019-03-17 DIAGNOSIS — R19.7: ICD-10-CM

## 2019-03-17 DIAGNOSIS — F41.9: ICD-10-CM

## 2019-03-17 DIAGNOSIS — J44.1: Primary | ICD-10-CM

## 2019-03-17 DIAGNOSIS — F32.9: ICD-10-CM

## 2019-03-17 DIAGNOSIS — I50.31: ICD-10-CM

## 2019-03-17 DIAGNOSIS — J18.9: ICD-10-CM

## 2019-03-17 DIAGNOSIS — G89.29: ICD-10-CM

## 2019-03-17 DIAGNOSIS — F17.210: ICD-10-CM

## 2019-03-17 DIAGNOSIS — I11.0: ICD-10-CM

## 2019-03-17 DIAGNOSIS — E78.2: ICD-10-CM

## 2019-03-17 DIAGNOSIS — F41.8: ICD-10-CM
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
## 2019-03-17 DIAGNOSIS — I25.10: ICD-10-CM

## 2019-03-17 DIAGNOSIS — M06.9: ICD-10-CM

## 2019-03-17 DIAGNOSIS — I34.0: ICD-10-CM

## 2019-03-17 DIAGNOSIS — Z79.84: ICD-10-CM

## 2019-03-17 DIAGNOSIS — E11.51: ICD-10-CM

## 2019-03-17 DIAGNOSIS — Z79.01: ICD-10-CM

## 2019-03-17 DIAGNOSIS — Z91.14: ICD-10-CM

## 2019-03-17 DIAGNOSIS — Z86.718: ICD-10-CM

## 2019-03-17 DIAGNOSIS — Z96.651: ICD-10-CM

## 2019-03-17 LAB
ALBUMIN SERPL-MCNC: 4.1 G/DL (ref 3.5–5)
ALBUMIN/GLOB SERPL: 1 {RATIO} (ref 1–2.1)
ALT SERPL-CCNC: 12 U/L (ref 9–52)
AST SERPL-CCNC: 26 U/L (ref 14–36)
BASOPHILS # BLD AUTO: 0 K/UL (ref 0–0.2)
BASOPHILS NFR BLD: 0.6 % (ref 0–2)
BNP SERPL-MCNC: 3510 PG/ML (ref 0–900)
BUN SERPL-MCNC: 7 MG/DL (ref 7–17)
CALCIUM SERPL-MCNC: 9.9 MG/DL (ref 8.4–10.2)
EOSINOPHIL # BLD AUTO: 0 K/UL (ref 0–0.7)
EOSINOPHIL NFR BLD: 0 % (ref 0–4)
ERYTHROCYTE [DISTWIDTH] IN BLOOD BY AUTOMATED COUNT: 16.3 % (ref 11.5–14.5)
GFR NON-AFRICAN AMERICAN: > 60
HGB BLD-MCNC: 12.2 G/DL (ref 12–16)
LYMPHOCYTES # BLD AUTO: 0.8 K/UL (ref 1–4.3)
LYMPHOCYTES NFR BLD AUTO: 11.8 % (ref 20–40)
MCH RBC QN AUTO: 29.8 PG (ref 27–31)
MCHC RBC AUTO-ENTMCNC: 32.7 G/DL (ref 33–37)
MCV RBC AUTO: 91.3 FL (ref 81–99)
MONOCYTES # BLD: 0.1 K/UL (ref 0–0.8)
MONOCYTES NFR BLD: 2.2 % (ref 0–10)
NEUTROPHILS # BLD: 5.5 K/UL (ref 1.8–7)
NEUTROPHILS NFR BLD AUTO: 85.4 % (ref 50–75)
NRBC BLD AUTO-RTO: 0.1 % (ref 0–0)
PLATELET # BLD: 166 K/UL (ref 130–400)
PMV BLD AUTO: 9.9 FL (ref 7.2–11.7)
RBC # BLD AUTO: 4.08 MIL/UL (ref 3.8–5.2)
WBC # BLD AUTO: 6.4 K/UL (ref 4.8–10.8)

## 2019-03-17 PROCEDURE — 3E0F73Z INTRODUCTION OF ANTI-INFLAMMATORY INTO RESPIRATORY TRACT, VIA NATURAL OR ARTIFICIAL OPENING: ICD-10-PCS | Performed by: INTERNAL MEDICINE

## 2019-03-17 NOTE — ED PDOC
HPI: SOB/CHF/COPD


Time Seen by Provider: 03/17/19 19:26


Chief Complaint (Nursing): Shortness Of Breath


Chief Complaint (Provider): Shortness Of Breath


History/Exam Limitations: no limitations


Onset/Duration Of Symptoms: Days


Current Symptoms Are (Timing): Still Present


Additional Complaint(s): 


59 y/o  female with a PMHx of COPD, HTN, DM, DVT to the right upper 

extremity and having recently underwent a right knee replacement performed by 

Dr. Snow presents to the ED for evaluation of shortness of breath. Patient 

reports of being in rehab for one month and just returning home this past 

Friday. Patient states that on Friday night, while she was sleeping, she fell 

out of bed injuring herself. Patient notes she chose not to come to the hospital

that night but decided to come to the ED today after developing shortness of 

breath. Patient reports of having a dry cough. Patient notes of having her 

metered dose MDI with no relief. Patient brought in by paramedics who 

administered SOLU-Medrol and duonebs in the field with mild improvement. Patient

reports of experiencing pain relating to the right knee replacement due to the 

fall. Patient additionally notes of some bruising. Patient states she is 

currently on blood thinners. 








PMD: Ct Simmons





Past Medical History


Reviewed: Historical Data, Nursing Documentation, Vital Signs


Vital Signs: 





                                Last Vital Signs











Temp  98.5 F   03/17/19 19:51


 


Pulse  77   03/17/19 19:51


 


Resp  18   03/17/19 19:51


 


BP  163/91 H  03/17/19 19:51


 


Pulse Ox  93 L  03/17/19 19:51














- Medical History


PMH: Anxiety, Arthritis, Asthma, Back Problems (Chronic back pain), Bronchitis, 

CAD, Depression, Diabetes, Deep Vein Thrombosis, Fractures (R ankle fx), HTN, 

Hypercholesterolemia, Rheumatoid Arthritis


   Denies: COPD (denies hx), HIV, Pneumonia (denies hx), Pulmonary Embolism 

(denies hx), Chronic Kidney Disease





- Surgical History


Surgical History: Endoscopy, Tonsillectomy


   Denies: Pacemaker





- Family History


Family History: States: Stroke





- Social History


Current smoker - smoking cessation education provided: Yes (4 cigarettes per 

day)





- Immunization History


Hx Tetanus Toxoid Vaccination: No


Hx Influenza Vaccination: Yes


Hx Pneumococcal Vaccination: No





- Home Medications


Home Medications: 


                                Ambulatory Orders











 Medication  Instructions  Recorded


 


Folic Acid 1 mg PO DAILY #0 tab 05/25/15


 


Potassium Chloride [K-Dur 20 mEq 20 meq PO DAILY 03/30/16





ER Tab]  


 


traZODone [Desyrel] 200 mg PO HS 03/30/16


 


Topiramate [Topamax] 100 mg PO BID 12/18/16


 


Zolpidem Tartrate [Ambien] 10 mg PO HS 12/18/16


 


oxyCODONE [oxyCODONE Immediate 30 mg PO QID PRN 12/18/16





Release Tab]  


 


Rivaroxaban [Xarelto] 20 mg PO DAILY  tab 12/20/16


 


Escitalopram [Lexapro] 20 mg PO DAILY 02/11/18


 


ALPRAZolam [Xanax] 1 mg PO BID 01/11/19


 


NIFEdipine ER [Procardia XL] 90 mg PO DAILY 01/11/19


 


Docusate [Colace] 100 mg PO BID  cap 01/14/19


 


Albuterol HFA [Ventolin HFA 90 1 puff INH RQ6 PRN  inhaler 01/21/19





mcg/actuation (8 g)]  


 


Bisacodyl [Dulcolax] 10 mg PO DAILY PRN  ect 01/21/19


 


Gabapentin [Neurontin] 400 mg PO TID  cap 01/21/19


 


Nicotine 21 mg/24 hr [Nicoderm Cq] 1 patch TD DAILY  patch 01/21/19


 


Pantoprazole [Protonix EC Tab] 40 mg PO DAILY  ect 01/21/19


 


SITagliptin [Januvia] 100 mg PO DAILY  tab 01/21/19


 


ALPRAZolam [Xanax] 1 mg PO BID  tab 03/20/19


 


Albuterol/Ipratropium [Duoneb 3 3 ml INH RQ4  neb 03/20/19





mg/0.5 mg (3 ml) UD]  


 


Cefdinir [Omnicef] 300 mg PO DAILY #7 cap 03/20/19


 


Levofloxacin [Levaquin] 750 mg PO DAILY #7 tablet 03/20/19


 


Methylprednisolone [Medrol Dose 4 mg PO DAILY #21 mg 03/20/19





Pack (21 tabs)]  


 


Saccharomyces Boulardi [Florastor] 250 mg PO DAILY #7 cap 03/20/19


 


guaiFENesin/Dextromethorphan 10 ml PO Q4 PRN  udc 03/20/19





[Robitussin DM]  














- Allergies


Allergies/Adverse Reactions: 


                                    Allergies











Allergy/AdvReac Type Severity Reaction Status Date / Time


 


No Known Allergies Allergy   Verified 01/11/19 06:29














Review of Systems


ROS Statement: Except As Marked, All Systems Reviewed And Found Negative


Constitutional: Negative for: Fever


Respiratory: Positive for: Cough, Shortness of Breath


Musculoskeletal: Positive for: Leg Pain





Physical Exam





- Reviewed


Nursing Documentation Reviewed: Yes


Vital Signs Reviewed: Yes





- Physical Exam


Appears: Positive for: In Acute Distress (mild respiratory distress)


Head Exam: Positive for: ATRAUMATIC


Skin: Positive for: Warm, Dry


Eye Exam: Positive for: Normal appearance, EOMI, PERRL


Respiratory: Positive for: Decreased Breath Sounds, Wheezing (diffuse expiratory

wheeze), Respiratory Distress (decreased air entry)


Gastrointestinal/Abdominal: Positive for: Normal Exam, Soft.  Negative for: 

Tenderness


Extremity: Positive for: Other (ecchymosis to the left shoulder. Right knee scar

well healed from replacement)


Neurological/Psych: Positive for: Awake, Alert, Oriented





- Laboratory Results


Result Diagrams: 


                                 03/20/19 05:40





                                 03/20/19 05:40





- ECG


O2 Sat by Pulse Oximetry: 93 (RA)


Pulse Ox Interpretation: Normal





- Critical Care


Total Time (In Min): 30


Documented Critical Care: Time excludes all time spent performint seperately 

billable procedures





Medical Decision Making


Medical Decision Making: 


Time: 1949


Impression: 59 y/o  female with acute COPD exacerbation


Plan:


-- EKG


-- EKG


-- B-Type Natriuretic


-- CMP


-- Troponin I


-- CBC with Differentials


-- PTT


-- Prothrombin Time


-- CXR Portable


-- Duoneb 3mg/0.5mg 3 ml (UD) 3 ml INH


-- Magnesium Sulfate 2 gm in 50 ml IV


-- oxyCODONE 30 mg PO


-- Blood Culture


-- Heplock Insertion


-- Peak Flow Pre/Post Tx








Time: 2153


EXAM: 


CR Chest, 1 View. 





CLINICAL HISTORY: 


S O B 





COMPARISON: 


None provided. 





FINDINGS: 





LUNGS: 


Subtle apparent airspace opacity in the lateral right lower lobe could be com

patible with a small patchy pneumonic consolidation. 





PLEURAL SPACES: 


No evidence of pleural effusion or pneumothorax. 





MEDIASTINUM: 


There is mild cardiomegaly.  There is some central vascular congestion present. 

These findings suggest some component of CHF. 





BONES: 


No acute osseous abnormality. 





IMPRESSION: 


1.  Findings suggestive of some component of CHF. 


2.  Questionable subtle patchy pneumonic consolidation in the lateral right lung

base.


 


Electronically signed on Mar 17, 2019 9:53:54 PM EDT by:


[place] M.D., MBA Certified By [facility]BR & [facility]


Fellowship Trained MRI and CT Specialist








Time: 2212


-- CXR demonstrates questionable lower lobe infiltrated. Labs demonstrate 

elevated Pro-BNP. Additional medications including Levaquin and Lasix ordered. 

Patient to be admitted for COPD, CHF and Pneumonia. 


-- Spoke to Dr. Snyder who will be accepting the patient for admission.





-- Duoneb 3mg/0.5mg 3 ml (UD) 3 ml INH


-- Levaquin 500 mg in 100 ml IVPB


-- Lasix 40 mg IV





_________________________________________________________________________


Scribe Attestation:


Documented by Edward Oseguera, acting as a scribe Sheyla Palma MD.





Provider Scribe Attestation:


All medical record entries made by the Scribe were at my direction and 

personally dictated by me. I have reviewed the chart and agree that the record 

accurately reflects my personal performance of the history, physical exam, 

medical decision making, and the department course for this patient. I have also

personally directed, reviewed, and agree with the discharge instructions and 

disposition.





Disposition





- Clinical Impression


Clinical Impression: 


 Chr obstructive pulmonary disease w/ acute lower respiratory infxn, PNA 

(pneumonia)








- Patient ED Disposition


Is Patient to be Admitted: Yes





- Disposition


Disposition Time: 22:12


Condition: FAIR

## 2019-03-18 LAB
ALBUMIN SERPL-MCNC: 4.3 G/DL (ref 3.5–5)
ALBUMIN/GLOB SERPL: 1.1 {RATIO} (ref 1–2.1)
ALT SERPL-CCNC: 14 U/L (ref 9–52)
APTT BLD: 36.1 SECONDS (ref 25.6–37.1)
AST SERPL-CCNC: 16 U/L (ref 14–36)
BASOPHILS # BLD AUTO: 0 K/UL (ref 0–0.2)
BASOPHILS NFR BLD: 0.2 % (ref 0–2)
BUN SERPL-MCNC: 11 MG/DL (ref 7–17)
CALCIUM SERPL-MCNC: 10.2 MG/DL (ref 8.4–10.2)
EOSINOPHIL # BLD AUTO: 0 K/UL (ref 0–0.7)
EOSINOPHIL NFR BLD: 0 % (ref 0–4)
ERYTHROCYTE [DISTWIDTH] IN BLOOD BY AUTOMATED COUNT: 16.2 % (ref 11.5–14.5)
GFR NON-AFRICAN AMERICAN: > 60
HGB BLD-MCNC: 13.1 G/DL (ref 12–16)
INR PPP: 2.1
LYMPHOCYTES # BLD AUTO: 0.8 K/UL (ref 1–4.3)
LYMPHOCYTES NFR BLD AUTO: 16.8 % (ref 20–40)
MCH RBC QN AUTO: 29 PG (ref 27–31)
MCHC RBC AUTO-ENTMCNC: 32 G/DL (ref 33–37)
MCV RBC AUTO: 90.6 FL (ref 81–99)
MONOCYTES # BLD: 0.3 K/UL (ref 0–0.8)
MONOCYTES NFR BLD: 6.5 % (ref 0–10)
NEUTROPHILS # BLD: 3.8 K/UL (ref 1.8–7)
NEUTROPHILS NFR BLD AUTO: 76.5 % (ref 50–75)
NRBC BLD AUTO-RTO: 0.1 % (ref 0–0)
PLATELET # BLD: 192 K/UL (ref 130–400)
PMV BLD AUTO: 9.2 FL (ref 7.2–11.7)
PROTHROMBIN TIME: 23.8 SECONDS (ref 9.8–13.1)
RBC # BLD AUTO: 4.51 MIL/UL (ref 3.8–5.2)
WBC # BLD AUTO: 4.9 K/UL (ref 4.8–10.8)

## 2019-03-18 RX ADMIN — IPRATROPIUM BROMIDE AND ALBUTEROL SULFATE SCH ML: .5; 3 SOLUTION RESPIRATORY (INHALATION) at 15:10

## 2019-03-18 RX ADMIN — IPRATROPIUM BROMIDE AND ALBUTEROL SULFATE SCH ML: .5; 3 SOLUTION RESPIRATORY (INHALATION) at 07:39

## 2019-03-18 RX ADMIN — POTASSIUM CHLORIDE SCH MEQ: 20 TABLET, EXTENDED RELEASE ORAL at 09:57

## 2019-03-18 RX ADMIN — INSULIN LISPRO SCH: 100 INJECTION, SOLUTION INTRAVENOUS; SUBCUTANEOUS at 23:05

## 2019-03-18 RX ADMIN — METHYLPREDNISOLONE SODIUM SUCCINATE SCH MG: 40 INJECTION, POWDER, FOR SOLUTION INTRAMUSCULAR; INTRAVENOUS at 18:15

## 2019-03-18 RX ADMIN — INSULIN LISPRO SCH UNITS: 100 INJECTION, SOLUTION INTRAVENOUS; SUBCUTANEOUS at 13:26

## 2019-03-18 RX ADMIN — IPRATROPIUM BROMIDE AND ALBUTEROL SULFATE SCH ML: .5; 3 SOLUTION RESPIRATORY (INHALATION) at 03:02

## 2019-03-18 RX ADMIN — OXYCODONE HYDROCHLORIDE PRN MG: 10 TABLET ORAL at 21:37

## 2019-03-18 RX ADMIN — PANTOPRAZOLE SODIUM SCH MG: 40 TABLET, DELAYED RELEASE ORAL at 09:56

## 2019-03-18 RX ADMIN — METHYLPREDNISOLONE SODIUM SUCCINATE SCH MG: 40 INJECTION, POWDER, FOR SOLUTION INTRAMUSCULAR; INTRAVENOUS at 02:54

## 2019-03-18 RX ADMIN — IPRATROPIUM BROMIDE AND ALBUTEROL SULFATE SCH ML: .5; 3 SOLUTION RESPIRATORY (INHALATION) at 23:56

## 2019-03-18 RX ADMIN — IPRATROPIUM BROMIDE AND ALBUTEROL SULFATE SCH ML: .5; 3 SOLUTION RESPIRATORY (INHALATION) at 19:20

## 2019-03-18 RX ADMIN — NIFEDIPINE SCH MG: 90 TABLET, FILM COATED, EXTENDED RELEASE ORAL at 09:56

## 2019-03-18 RX ADMIN — INSULIN LISPRO SCH UNITS: 100 INJECTION, SOLUTION INTRAVENOUS; SUBCUTANEOUS at 06:36

## 2019-03-18 RX ADMIN — IPRATROPIUM BROMIDE AND ALBUTEROL SULFATE SCH ML: .5; 3 SOLUTION RESPIRATORY (INHALATION) at 11:09

## 2019-03-18 RX ADMIN — CIPROFLOXACIN SCH MLS/HR: 2 INJECTION, SOLUTION INTRAVENOUS at 21:44

## 2019-03-18 RX ADMIN — WATER SCH MLS/HR: 1 INJECTION INTRAMUSCULAR; INTRAVENOUS; SUBCUTANEOUS at 23:03

## 2019-03-18 RX ADMIN — OXYCODONE HYDROCHLORIDE PRN MG: 10 TABLET ORAL at 08:20

## 2019-03-18 RX ADMIN — INSULIN LISPRO SCH UNITS: 100 INJECTION, SOLUTION INTRAVENOUS; SUBCUTANEOUS at 17:41

## 2019-03-18 RX ADMIN — METHYLPREDNISOLONE SODIUM SUCCINATE SCH MG: 40 INJECTION, POWDER, FOR SOLUTION INTRAMUSCULAR; INTRAVENOUS at 10:00

## 2019-03-18 NOTE — RAD
Date of service: 



03/17/2019



HISTORY:

 SOB 



COMPARISON:

02/13/2019. 



FINDINGS:



LUNGS:

The lungs are hyperinflated and there is peribronchial thickening 

with chronic changes in both lungs. 



There is subsegmental atelectasis in the lower lobes.  There is 

patchy airspace disease in the right lower lobe.  There is mild 

pulmonary venous congestion. 



PLEURA:

No pleural effusions or pneumothorax. 



CARDIOVASCULAR:

Mild cardiomegaly and prominent central vasculature.  No aortic 

atherosclerotic calcifications present. 



OSSEOUS STRUCTURES:

Within normal limits for the patient's age.



VISUALIZED UPPER ABDOMEN:

Normal.



OTHER FINDINGS:

None.



IMPRESSION:

No active pulmonary disease.  Patchy airspace disease in the right 

lower lobe may represent subsegmental atelectasis however 

superimposed pneumonia cannot be excluded.



Follow-up after medical management is recommended to ensure complete 

resolution.

## 2019-03-18 NOTE — CARD
--------------- APPROVED REPORT --------------





Date of service: 03/18/2019



EXAM: Two-dimensional and M-mode echocardiogram with Doppler and 

color Doppler.



Other Information 

Quality : FairRhythm : NSR

Technically limited study due to  Poor window



INDICATION

Dyspnea Congestive Heart Failure 



2D DIMENSIONS 

IVSd0.83   (0.7-1.1cm)LVDd5.13   (3.9-5.9cm)

LVOT Diameter2.27   (1.8-2.4cm)PWd0.76   (0.7-1.1cm)

IVSs1.34   (0.8-1.2cm)LVDs3.56   (2.5-4.0cm)

FS (%) 30.5   %PWs0.97   (0.8-1.2cm)



M-Mode DIMENSIONS 

Left Atrium (MM)4.93   (2.5-4.0cm)IVSd0.93   (0.7-1.1cm)

Aortic Root2.85   (2.2-3.7cm)LVDd6.32   (4.0-5.6cm)

Aortic Cusp Exc.2.02   (1.5-2.0cm)PWd1.19   (0.7-1.1cm)

IVSs1.65   cmFS (%) 52   %

LVDs3.01   (2.0-3.8cm)PWs1.92   cm



Aortic Valve

AoV Peak Pwlnkxns002.5cm/sAoV VTI24.8cmAO Peak GR.5mmHg

LVOT Peak Cvjatnlm46.9cm/sLVOT VTI21.19cmAO Mean GR.3mmHg

KALYANI (VMAX)1.70im0PSB (VTI)1.87cm2



Mitral Valve

MV E [place]i13.3cm/sMV DECEL [id number] A Lmycxlvc38.4cm/s

MV NHY92rsN/A ratio0.8MVA (PHT)2.58cm2



TDI

Lateral E' Peak V3.91cm/sMedial E' Peak V4.81cm/sE/Lateral E'12.6

E/Medial E'10.2



 LEFT VENTRICLE 

The left ventricle is normal size.

There is normal left ventricular wall thickness.

The left ventricular systolic function is normal.

The estimated ejection fraction is 55-60%

No regional wall motion abnormalities noted..

Transmitral Doppler flow pattern is Grade I-abnormal relaxation 

pattern.

No left ventricle thrombus noted on this study.

There is no ventricular septal defect visualized.

There is no left ventricular aneurysm.

There is no mass noted in the left ventricle.



 RIGHT VENTRICLE 

The right ventricle is normal size.

There is normal right ventricular wall thickness.

The right ventricular systolic function is normal.



 ATRIA 

The left atrium is moderately dilated.

The right atrium size is normal.

The interatrial septum is intact with no evidence for an atrial 

septal defect.



 AORTIC VALVE 

The aortic valve is normal in structure.

No aortic regurgitation is present.

There is no aortic valvular stenosis.

There is no aortic valvular vegetation.



 MITRAL VALVE 

The mitral valve is normal in structure.

There is no evidence of mitral valve prolapse.

There is no mitral valve stenosis.

There is mild mitral valve regurgitation noted.



 TRICUSPID VALVE 

The tricuspid valve is normal in structure.

There is no tricuspid valve regurgitation noted.

There is no tricuspid valve prolapse or vegetation.

There is no tricuspid valve stenosis.



 PULMONIC VALVE 

The pulmonary valve is normal in structure.

There is no pulmonic valvular regurgitation.

There is no pulmonic valvular stenosis.



 GREAT VESSELS 

The aortic root is normal in size.

The ascending aorta is normal in size.

The pulmonary artery is normal.

The IVC is normal in size and collapses >50% with inspiration.



 PERICARDIAL EFFUSION 

There is no pericardial effusion. There is small pericardial fat. 

There is no pleural effusion.



<Conclusion>

The estimated ejection fraction is 55-60%

Transmitral Doppler flow pattern is Grade I-abnormal relaxation 

pattern.

The left atrium is moderately dilated.

There is mild mitral valve regurgitation noted.

There is no tricuspid valve regurgitation noted.

The IVC is normal in size and collapses >50% with inspiration.

## 2019-03-18 NOTE — CP.PCM.HP
<Masoud Pierson - Last Filed: 03/18/19 02:04>





History of Present Illness





- History of Present Illness


History of Present Illness: 





57 y/o F with a PMHx of HTN, DM2, HLD, COPD, RUE DVT and asthma was brought to 

ED due to dyspnea that began 3 days ago and progressively aggravated. Pt 

complains of productive cough with white thick sputum, nasal congestion, runny 

nose and sneezing. Pt also complains of daily watery mucous diarrhea since 14 

days ago that is associated with intermittent abdominal cramping. Pt reports 

having fever for 2 days, a temp of 102 one night ago, with chills and inte

rmittent sweating. Pt denies nausea, vomiting, chest pain, palpitations, 

constipation. Pt reports not eating anything for the last 5 days due to poor 

appetite and loss of taste perception of food. Pt states that on Friday night, 

she fell out from bed while she was sleeping, injuring herself, denies seizures,

visual disturbances or urinary incontinence. 


--Pt had a R TKR on 01/14/19, was discharged from our hospital 1 month ago due 

to R knee wound infection and S/P R knee Irrigation and Debridement, and 

discharged from rehab facility 2 days ago.  





PMD: Dr. Mittal


Specialists: Pulm Dr. Singh, Cardio Dr. Kemar FENG


Meds: As per EMR





-PMHx: DVT of R arm, IVC filter, HTN, COPD, HLD, PVD, chronic back pain s/p 

multiple compression fractures of thoracic spine, prediabetes


-PSHx: b/l foot surgery, left ankle fracture repaired, right foot fracture 

repair (screws in place), BTL


-FHx: stroke (mother), multiple myeloma (father), breast cancer (sister) 


-SHx: current smoker, cut down to 4 cig/day for the past 2 months, prior had 42 

pack years, no alcohol or drugs. No alcohol and no rec drugs. Pt lives alone, 

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








ED Course:


--Vital signs: unremarkable except for /91-high and O2 sat 93%. 


--CBC with NO leukocytosis, 


--Pro-BNP 3,510-high


--troponin neg x1.


--CXR: suggestive of some component of CHF, ?subtle patchy pneumonic 

consolidation in the lateral RLL.


--EKG: unremarkable. 


--Duoneb 3 ml INH x1, Levaquin 500 mg x1, Lasix 40 mg IV x1. 








Present on Admission





- Present on Admission


Any Indicators Present on Admission: No


History of DVT/PE: Yes


Urinary Catheter: No


Decubitus Ulcer Present: No





Review of Systems





- Constitutional


Constitutional: Anorexia, Chills, Fever





- EENT


Eyes: absent: Blurred Vision, Change in Vision


Nose/Mouth/Throat: Nasal Congestion.  absent: Sore Throat, Neck Pain, Neck Mass





- Cardiovascular


Cardiovascular: Dyspnea.  absent: Chest Pain, Edema





- Respiratory


Respiratory: Cough, Dyspnea.  absent: Hemoptysis, Dyspnea on Exertion





- Gastrointestinal


Gastrointestinal: Abdominal Pain, Diarrhea.  absent: Belching, Constipation, 

Hematochezia, Nausea, Vomiting





- Genitourinary


Genitourinary: absent: Dysuria, Flank Pain, Hematuria





Past Patient History





- Infectious Disease


Hx of Infectious Diseases: None





- Tetanus Immunizations


Tetanus Immunization: Unknown





- Past Medical History & Family History


Past Medical History?: Yes





- Past Social History


Smoking Status: Light Smoker < 10 Cigarettes Daily





- CARDIAC


Hx Cardiac Disorders: Yes


Hx Hypertension: Yes





- PULMONARY


Hx Respiratory Disorders: Yes


Hx Asthma: Yes


Hx Bronchitis: Yes


Hx Chronic Obstructive Pulmonary Disease (COPD): Yes





- NEUROLOGICAL


Hx Neurological Disorder: No





- HEENT


Hx HEENT Problems: No





- RENAL


Hx Chronic Kidney Disease: No





- ENDOCRINE/METABOLIC


Hx Endocrine Disorders: Yes


Hx Diabetes Mellitus Type 2: Yes





- HEMATOLOGICAL/ONCOLOGICAL


Hx Blood Disorders: No


Hx Human Immunodeficiency Virus (HIV): No


Other/Comment: DVT-righr upper ext





- INTEGUMENTARY


Hx Dermatological Problems: No





- MUSCULOSKELETAL/RHEUMATOLOGICAL


Hx Musculoskeletal Disorders: Yes


Hx Arthritis: Yes


Hx Back Pain: Yes


Hx Falls: Yes





- GASTROINTESTINAL


Hx Gastrointestinal Disorders: No


Hx Ulcer: No





- GENITOURINARY/GYNECOLOGICAL


Hx Genitourinary Disorders: No





- PSYCHIATRIC


Hx Psychophysiologic Disorder: Yes


Hx Anxiety: Yes


Hx Depression: Yes





- SURGICAL HISTORY


Hx Surgeries: Yes


Hx Orthopedic Surgery: Yes (right knee replecement,left ankle surgery)


Hx Tonsillectomy: Yes





- ANESTHESIA


Hx Anesthesia: Yes


Hx Anesthesia Reactions: No


Hx Malignant Hyperthermia: No


Has any member of the family had a problem w/ anesthesia?: No





Meds


Allergies/Adverse Reactions: 


                                    Allergies











Allergy/AdvReac Type Severity Reaction Status Date / Time


 


No Known Allergies Allergy   Verified 01/11/19 06:29














Physical Exam





- Constitutional


Appears: Well





- Head Exam


Head Exam: ATRAUMATIC, NORMAL INSPECTION, NORMOCEPHALIC





- Eye Exam


Eye Exam: EOMI


Pupil Exam: PERRL





- Neck Exam


Neck exam: Positive for: Full Rom, Normal Inspection.  Negative for: Meningismus





- Respiratory Exam


Respiratory Exam: Clear to Auscultation Bilateral, NORMAL BREATHING PATTERN





- Cardiovascular Exam


Cardiovascular Exam: REGULAR RHYTHM, +S1, +S2





- GI/Abdominal Exam


GI & Abdominal Exam: Distended (mildly), Hyperactive Bowel Sounds, Soft, 

Tenderness (epigastric and b/l loqer quadrants.).  absent: Guarding, Pulsatile 

Mass, Rebound





- Extremities Exam


Extremities exam: Negative for: full ROM





- Psychiatric Exam


Psychiatric exam: Normal Affect





Results





- Vital Signs


Recent Vital Signs: 





                                Last Vital Signs











Temp  98.7 F   03/18/19 00:57


 


Pulse  66   03/18/19 00:57


 


Resp  18   03/18/19 00:57


 


BP  166/79 H  03/18/19 00:57


 


Pulse Ox  94 L  03/18/19 00:57














- Labs


Result Diagrams: 


                                 03/17/19 20:50





                                 03/17/19 20:50


Labs: 





                         Laboratory Results - last 24 hr











  03/17/19 03/17/19





  20:50 20:50


 


WBC   6.4


 


RBC   4.08


 


Hgb   12.2


 


Hct   37.3


 


MCV   91.3  D


 


MCH   29.8


 


MCHC   32.7 L


 


RDW   16.3 H


 


Plt Count   166


 


MPV   9.9


 


Neut % (Auto)   85.4 H


 


Lymph % (Auto)   11.8 L


 


Mono % (Auto)   2.2


 


Eos % (Auto)   0.0


 


Baso % (Auto)   0.6


 


Neut # (Auto)   5.5


 


Lymph # (Auto)   0.8 L


 


Mono # (Auto)   0.1


 


Eos # (Auto)   0.0


 


Baso # (Auto)   0.0


 


Sodium  138 


 


Potassium  4.4 


 


Chloride  101 


 


Carbon Dioxide  20 L 


 


Anion Gap  21 H 


 


BUN  7 


 


Creatinine  0.6 L 


 


Est GFR ( Amer)  > 60 


 


Est GFR (Non-Af Amer)  > 60 


 


Random Glucose  176 H 


 


Calcium  9.9 


 


Total Bilirubin  0.8 


 


AST  26 


 


ALT  12 


 


Alkaline Phosphatase  83 


 


Troponin I  0.0440 


 


NT-Pro-B Natriuret Pep  3510 H 


 


Total Protein  8.2 


 


Albumin  4.1 


 


Globulin  4.1 H 


 


Albumin/Globulin Ratio  1.0 














Assessment & Plan





- Assessment and Plan (Free Text)


Assessment: 





57 y/o F with a PMHx of HTN, DM2, HLD, COPD, RUE DVT, depression and asthma was 

admitted for evaluation and management of dyspnea. 


--CXR: suggestive of some component of CHF, ?subtle patchy pneumonic 

consolidation in the lateral RLL.





PLAN: 





>Dyspnea


--RLL pneumonia vs acute CHF exacerbation vs COPD exacerbation


--Afebrile, VSS, no leukocytosis


--PE less likely since no acute changes in EKG. 


--Continuous cardiac monitoring


--Pro-BNP 3,510-high; troponin neg x1.


--C/w O2 by NC at 2 L/min


--Levofloxacin 750mg IV daily


--Duonebs MAT Q4H, Methylprednisolone 40mg IV Q8H


--Lasix 40mg IV daily


--Echocardiogram to be perform tomorrow's morning. 


--F/U AM labs: pro-calcitonin, sputum Cx, CBC, CMP, urine Strep pneumo and U  

negative, etc. 





>Diarrhea 


--C diff screen ordered.


--Under observation. 





>HTN


--Elevated, likely due to anxiety, pain and stress of breathing dificulty, 

possibly Hx of uncontrolled HTN


--Home meds resume. 





>DM 2


--Seems controlled, last HbA1c 6.3 1 year ago. 


--Insulin Sliding scale


--Hypoglycemia protocol


--Home meds resumed. 





>?Depression


--Home meds resumed





>DVT Prophylaxis


--Hx of previous DVT


--On Xarelto 





>Diabetic Diet


>Full code





Case discussed with Dr Snyder, hospitalist


Dangelo PGY-2








- Date & Time


Date: 03/18/19


Time: 23:30





<Charles Snyder - Last Filed: 03/18/19 11:36>





Results





- Vital Signs


Recent Vital Signs: 





                                Last Vital Signs











Temp  98.2 F   03/18/19 09:11


 


Pulse  67   03/18/19 09:11


 


Resp  20   03/18/19 09:11


 


BP  156/90 H  03/18/19 09:54


 


Pulse Ox  94 L  03/18/19 09:11














- Labs


Result Diagrams: 


                                 03/17/19 20:50





                                 03/17/19 20:50


Labs: 





                         Laboratory Results - last 24 hr











  03/17/19 03/17/19 03/18/19





  20:50 20:50 05:56


 


WBC   6.4 


 


RBC   4.08 


 


Hgb   12.2 


 


Hct   37.3 


 


MCV   91.3  D 


 


MCH   29.8 


 


MCHC   32.7 L 


 


RDW   16.3 H 


 


Plt Count   166 


 


MPV   9.9 


 


Neut % (Auto)   85.4 H 


 


Lymph % (Auto)   11.8 L 


 


Mono % (Auto)   2.2 


 


Eos % (Auto)   0.0 


 


Baso % (Auto)   0.6 


 


Neut # (Auto)   5.5 


 


Lymph # (Auto)   0.8 L 


 


Mono # (Auto)   0.1 


 


Eos # (Auto)   0.0 


 


Baso # (Auto)   0.0 


 


Sodium  138  


 


Potassium  4.4  


 


Chloride  101  


 


Carbon Dioxide  20 L  


 


Anion Gap  21 H  


 


BUN  7  


 


Creatinine  0.6 L  


 


Est GFR ( Amer)  > 60  


 


Est GFR (Non-Af Amer)  > 60  


 


POC Glucose (mg/dL)    167 H


 


Random Glucose  176 H  


 


Calcium  9.9  


 


Total Bilirubin  0.8  


 


AST  26  


 


ALT  12  


 


Alkaline Phosphatase  83  


 


Troponin I  0.0440  


 


NT-Pro-B Natriuret Pep  3510 H  


 


Total Protein  8.2  


 


Albumin  4.1  


 


Globulin  4.1 H  


 


Albumin/Globulin Ratio  1.0  














Attending/Attestation





- Attestation


I have personally seen and examined this patient.: Yes


I have fully participated in the care of the patient.: Yes


I have reviewed all pertinent clinical information: Yes


Notes (Text): 





03/18/19 11:19


I saw, examined and discussed this patient with Dr Pierson. i agree with the 

assessment and plan outlined.


This is a 58 years old female with hx of COPD and DM II who comes with SOB and 

coughing not being relieved with her home bronchodilators.Chest X Ray showed 

right basal infiltrate with Elevated Pro BNP of 3510.


We will treat for CHF with Lasix and ACE inhibitor and consult cardiology with 

ECHO to evaluate left ventricular Ejection.


Treat for COPD exacerbation with Pneumonia. Antibiotic, Albuterol/ Ipatropium 

and Steroids. Consult with Pulmonary


Treat Diabetes mellitus 





Charles Snyder MD

## 2019-03-18 NOTE — CP.PCM.CON
History of Present Illness





- History of Present Illness


History of Present Illness: 








Pulmonary consult for a 59 y/o F, PMHx:  COPD, Asthma, Bronchitis, PE b/l on 

10/21/2013. Chronic smoker, admitted on 03/17/19 to [facility], [place] due to severe 

SOB, increased from 2-3 days PTA, associated to dry cough, at times productive 

with difficulty to expectorate thick sputum, nasal congestion, runny nose.  Pt 

had Nebulizer and Solu-medrol by EMS while in the field with some improvement.





Worsening symptoms:  As per Pt, Fever/chills ( TMAx 102,0 F ), intermittent 

sweating, while at home, weakness, also states, while sleeping on Friday night, 

she fell to the floor on her abdomen site not knowing how she got there, friends

and family decided to bring her to hospital.  Also, was c/o of R knee pain from 

recent R TKR in January 2019 in rehab x a month, there after on 2/11/19, 

returned to hospital for R knee wound infection having I&D discharged on 

03/14/19 with abx po, but unable to buy medications, since DD Pt was  not fe

eling well.





Aggravated factor:  Walking/exercise, non compliance with medications.





Pt denied:  Seizure, CP, palpitations, nausea, vomiting, abdominal pain, urinary

symptoms, sick contact, recent travel out of [place].





Other PMHx:  DVT R arm with AVC filter, DVT s/p Foot sx b/l, HTN, DMII, HDL, 

Glaucoma, Chronic back pain s/p multiple compression Fx of thoracic spine, Hx GI

bleeding.





CXR:  No active disease.  Patchy airspace disease RLL, may represent 

atelectasis, however, superimposed PNA cannot be excluded.














Review of Systems





- Constitutional


Constitutional: Chills, Fever, Night Sweats, Weakness





- EENT


Eyes: Requires Corrective Lenses


Ears: Other


Nose/Mouth/Throat: Nasal Congestion, Nasal Discharge





- Cardiovascular


Cardiovascular: Other (Dyspnea at rest)





- Respiratory


Respiratory: Cough, Dyspnea, Dyspnea on Exertion





- Gastrointestinal


Gastrointestinal: Diarrhea





- Genitourinary


Genitourinary: Other (negative)





- Musculoskeletal


Musculoskeletal: Arthralgias, Back Pain, Other (R knee pain)





- Neurological


Neurological: Abnormal Gait, Frequent Falls, Weakness





- Psychiatric


Psychiatric: Anxiety, Depression





- Endocrine


Endocrine: Other (negative)





- Hematologic/Lymphatic


Hematologic: Other (negative)





Past Patient History





- Infectious Disease


Hx of Infectious Diseases: None





- Tetanus Immunizations


Tetanus Immunization: Unknown





- Past Medical History & Family History


Past Medical History?: Yes


Pertinent Family History: 





Mother: Stroke, Father: Multiple Myeloma, Sister: Breast Ca.





- Past Social History


Smoking Status: Light Smoker < 10 Cigarettes Daily


Alcohol: None


Drugs: Denies


Home Situation {Lives}: Alone





- CARDIAC


Hx Cardiac Disorders: Yes


Hx Hypercholesterolemia: Yes


Hx Hypertension: Yes





- PULMONARY


Hx Respiratory Disorders: Yes


Hx Asthma: Yes


Hx Bronchitis: Yes


Hx Chronic Obstructive Pulmonary Disease (COPD): Yes





- NEUROLOGICAL


Hx Neurological Disorder: No





- HEENT


Hx HEENT Problems: Yes (Use eyeglases)





- RENAL


Hx Chronic Kidney Disease: No





- ENDOCRINE/METABOLIC


Hx Endocrine Disorders: Yes


Hx Diabetes Mellitus Type 2: Yes





- HEMATOLOGICAL/ONCOLOGICAL


Hx Blood Disorders: No


Hx Human Immunodeficiency Virus (HIV): No


Other/Comment: DVT-righr upper ext





- INTEGUMENTARY


Hx Dermatological Problems: No





- MUSCULOSKELETAL/RHEUMATOLOGICAL


Hx Musculoskeletal Disorders: Yes


Hx Arthritis: Yes


Hx Back Pain: Yes


Hx Falls: Yes





- GASTROINTESTINAL


Hx Gastrointestinal Disorders: No


Hx Ulcer: No





- GENITOURINARY/GYNECOLOGICAL


Hx Genitourinary Disorders: No





- PSYCHIATRIC


Hx Psychophysiologic Disorder: Yes


Hx Anxiety: Yes


Hx Depression: Yes





- SURGICAL HISTORY


Hx Surgeries: Yes


Hx Orthopedic Surgery: Yes (right knee replecement,left ankle surgery)


Hx Tonsillectomy: Yes





- ANESTHESIA


Hx Anesthesia: Yes


Hx Anesthesia Reactions: No


Hx Malignant Hyperthermia: No


Has any member of the family had a problem w/ anesthesia?: No





Meds


Allergies/Adverse Reactions: 


                                    Allergies











Allergy/AdvReac Type Severity Reaction Status Date / Time


 


No Known Allergies Allergy   Verified 01/11/19 06:29














- Medications


Medications: 


                               Current Medications





Albuterol/Ipratropium (Duoneb 3 Mg/0.5 Mg (3 Ml) Ud)  3 ml INH RQ4 [facility]


   Last Admin: 03/18/19 15:10 Dose:  3 ml


Alprazolam (Xanax)  1 mg PO BID [facility]


   Last Admin: 03/18/19 09:53 Dose:  1 mg


Dextrose (Glutose 15)  0 gm PO ONCE PRN; Protocol


   PRN Reason: Hypoglycemia Protocol


Dextrose (Dextrose 50% Inj)  0 ml IV STAT PRN; Protocol


   PRN Reason: Hypoglycemia Protocol


Enalapril Maleate (Vasotec)  10 mg PO DAILY [facility]


   Last Admin: 03/18/19 10:28 Dose:  10 mg


Escitalopram Oxalate (Lexapro)  20 mg PO DAILY [facility]


   Last Admin: 03/18/19 09:51 Dose:  20 mg


Folic Acid (Folic Acid)  1 mg PO DAILY [facility]


   Last Admin: 03/18/19 09:54 Dose:  1 mg


Furosemide (Lasix)  40 mg IVP DAILY [facility]


   Last Admin: 03/18/19 09:54 Dose:  40 mg


Gabapentin (Neurontin)  400 mg PO TID [facility]


   Last Admin: 03/18/19 13:26 Dose:  400 mg


Glucagon (Glucagen Diagnostic Kit)  0 mg IM STAT PRN; Protocol


   PRN Reason: Hypoglycemia Protocol


Levofloxacin/Dextrose (Levaquin 750mg)  750 mg in 150 mls @ 150 mls/hr IVPB 

DAILY@2100 [facility]


Insulin Human Lispro (Humalog)  0 units SC ACCU-CHECK [facility]; Protocol


   Last Admin: 03/18/19 13:26 Dose:  3 units


Methylprednisolone (Solu-Medrol)  40 mg IVP Q8@0300,1100,1900 [facility]


   Last Admin: 03/18/19 10:00 Dose:  40 mg


Nicotine (Nicoderm Cq)  1 patch TD DAILY [facility]


   Last Admin: 03/18/19 09:56 Dose:  1 patch


Nifedipine (Procardia Xl)  90 mg PO DAILY [facility]


   Last Admin: 03/18/19 09:56 Dose:  90 mg


Oxycodone HCl (Oxycodone Immediate Release Tab)  30 mg PO Q6H PRN


   PRN Reason: Pain, severe (8-10)


   Last Admin: 03/18/19 08:20 Dose:  30 mg


Pantoprazole Sodium (Protonix Ec Tab)  40 mg PO DAILY [facility]


   Last Admin: 03/18/19 09:56 Dose:  40 mg


Potassium Chloride (K-Dur 20 Meq Er Tab)  20 meq PO DAILY [facility]


   Last Admin: 03/18/19 09:57 Dose:  20 meq


Rivaroxaban (Xarelto)  20 mg PO DAILY [facility]; Protocol


   Last Admin: 03/18/19 09:51 Dose:  20 mg


Sitagliptin Phosphate (Januvia)  100 mg PO DAILY [facility]


   Last Admin: 03/18/19 09:54 Dose:  100 mg


Topiramate (Topamax)  100 mg PO BID [facility]


   Last Admin: 03/18/19 09:51 Dose:  100 mg


Trazodone HCl (Desyrel)  200 mg PO HS [facility]


Zolpidem Tartrate (Ambien)  5 mg PO HS [facility]


   Last Admin: 03/18/19 00:22 Dose:  5 mg











Physical Exam





- Constitutional


Appears: No Acute Distress





- Head Exam


Head Exam: NORMAL INSPECTION





- Eye Exam


Eye Exam: PERRL





- ENT Exam


ENT Exam: Normal Exam





- Neck Exam


Neck exam: Positive for: Normal Inspection





- Respiratory Exam


Respiratory Exam: Decreased Breath Sounds, Rhonchi, Wheezes (expiratory)





- Cardiovascular Exam


Cardiovascular Exam: REGULAR RHYTHM





- GI/Abdominal Exam


GI & Abdominal Exam: Soft





- Extremities Exam


Extremities exam: Positive for: tenderness (mild R knee)


Additional comments: 





R knee TKR, healing scar from sx.





- Back Exam


Back exam: tenderness





- Neurological Exam


Neurological exam: Alert, Oriented x3


Additional comments: 





Obeys commands, weak gait, no focal motor/sensory deficit





- Psychiatric Exam


Psychiatric exam: Anxious, Depressed





- Skin


Skin Exam: Warm





Results





- Vital Signs


Recent Vital Signs: 


                                Last Vital Signs











Temp  98.2 F   03/18/19 12:09


 


Pulse  60   03/18/19 12:09


 


Resp  18   03/18/19 12:09


 


BP  145/75   03/18/19 12:09


 


Pulse Ox  93 L  03/18/19 12:09








reviewed JAKE





- Labs


Result Diagrams: 


                                 03/19/19 04:35





                                 03/19/19 04:35


Labs: 


                         Laboratory Results - last 24 hr











  03/17/19 03/17/19 03/18/19





  20:50 20:50 05:56


 


WBC   6.4 


 


RBC   4.08 


 


Hgb   12.2 


 


Hct   37.3 


 


MCV   91.3  D 


 


MCH   29.8 


 


MCHC   32.7 L 


 


RDW   16.3 H 


 


Plt Count   166 


 


MPV   9.9 


 


Neut % (Auto)   85.4 H 


 


Lymph % (Auto)   11.8 L 


 


Mono % (Auto)   2.2 


 


Eos % (Auto)   0.0 


 


Baso % (Auto)   0.6 


 


Neut # (Auto)   5.5 


 


Lymph # (Auto)   0.8 L 


 


Mono # (Auto)   0.1 


 


Eos # (Auto)   0.0 


 


Baso # (Auto)   0.0 


 


PT   


 


INR   


 


APTT   


 


Sodium  138  


 


Potassium  4.4  


 


Chloride  101  


 


Carbon Dioxide  20 L  


 


Anion Gap  21 H  


 


BUN  7  


 


Creatinine  0.6 L  


 


Est GFR ( Amer)  > 60  


 


Est GFR (Non-Af Amer)  > 60  


 


POC Glucose (mg/dL)    167 H


 


Random Glucose  176 H  


 


Calcium  9.9  


 


Phosphorus   


 


Magnesium   


 


Total Bilirubin  0.8  


 


AST  26  


 


ALT  12  


 


Alkaline Phosphatase  83  


 


Troponin I  0.0440  


 


NT-Pro-B Natriuret Pep  3510 H  


 


Total Protein  8.2  


 


Albumin  4.1  


 


Globulin  4.1 H  


 


Albumin/Globulin Ratio  1.0  














  03/18/19 03/18/19 03/18/19





  11:15 13:50 13:50


 


WBC   4.9 


 


RBC   4.51 


 


Hgb   13.1 


 


Hct   40.9 


 


MCV   90.6 


 


MCH   29.0 


 


MCHC   32.0 L 


 


RDW   16.2 H 


 


Plt Count   192 


 


MPV   9.2 


 


Neut % (Auto)   76.5 H 


 


Lymph % (Auto)   16.8 L 


 


Mono % (Auto)   6.5 


 


Eos % (Auto)   0.0 


 


Baso % (Auto)   0.2 


 


Neut # (Auto)   3.8 


 


Lymph # (Auto)   0.8 L 


 


Mono # (Auto)   0.3 


 


Eos # (Auto)   0.0 


 


Baso # (Auto)   0.0 


 


PT   


 


INR   


 


APTT   


 


Sodium    140


 


Potassium    3.6


 


Chloride    95 L


 


Carbon Dioxide    27


 


Anion Gap    22 H


 


BUN    11


 


Creatinine    0.7


 


Est GFR ( Amer)    > 60


 


Est GFR (Non-Af Amer)    > 60


 


POC Glucose (mg/dL)  211 H  


 


Random Glucose    201 H


 


Calcium    10.2


 


Phosphorus    4.2


 


Magnesium    2.1


 


Total Bilirubin    0.4


 


AST    16


 


ALT    14


 


Alkaline Phosphatase    93


 


Troponin I   


 


NT-Pro-B Natriuret Pep   


 


Total Protein    8.3 H


 


Albumin    4.3


 


Globulin    4.0 H


 


Albumin/Globulin Ratio    1.1














  03/18/19





  13:50


 


WBC 


 


RBC 


 


Hgb 


 


Hct 


 


MCV 


 


MCH 


 


MCHC 


 


RDW 


 


Plt Count 


 


MPV 


 


Neut % (Auto) 


 


Lymph % (Auto) 


 


Mono % (Auto) 


 


Eos % (Auto) 


 


Baso % (Auto) 


 


Neut # (Auto) 


 


Lymph # (Auto) 


 


Mono # (Auto) 


 


Eos # (Auto) 


 


Baso # (Auto) 


 


PT  23.8 H


 


INR  2.1


 


APTT  36.1


 


Sodium 


 


Potassium 


 


Chloride 


 


Carbon Dioxide 


 


Anion Gap 


 


BUN 


 


Creatinine 


 


Est GFR ( Amer) 


 


Est GFR (Non-Af Amer) 


 


POC Glucose (mg/dL) 


 


Random Glucose 


 


Calcium 


 


Phosphorus 


 


Magnesium 


 


Total Bilirubin 


 


AST 


 


ALT 


 


Alkaline Phosphatase 


 


Troponin I 


 


NT-Pro-B Natriuret Pep 


 


Total Protein 


 


Albumin 


 


Globulin 


 


Albumin/Globulin Ratio 








reviewed J.[email or web address]





- EKG Data


EKG comments: 





reviewedJ.P.





- Imaging and Cardiology


  ** Chest x-ray


Status: Report reviewed by me (JAKE)





  ** Echocardiogram


Status: Report reviewed by me





Assessment & Plan


(1) PNA (pneumonia)


Status: Acute   Priority: High   


Comment: HCAP   





(2) COPD exacerbation


Status: Chronic   Priority: High   





- Assessment and Plan (Free Text)


Plan: 





F/U CT Chest, continue O2 NC 2 L/M, current abx coverage, Solu-Medrol, Duoneb 

and rest of Tx.





- Date & Time


Date: 03/18/19


Time: 11:50

## 2019-03-18 NOTE — CP.PCM.PN
Subjective





- Date & Time of Evaluation


Date of Evaluation: 03/18/19


Time of Evaluation: 08:00





- Subjective


Subjective: 





Patient is seen and examined at bedside. Patient have no acute event overnight. 

Patient complain of abdominal pain on the umbilical area, she denies radiation, 

she report pain most likely due to fall ( patient fall on her abdomen Friday), 

Patient also report some diarrhea but have improved. Otherwise she denies 

nausea, vomiting, chest pain, sob, abd pain, constipation or dysuria. She report

good appetite and able to feed well. 





Objective





- Vital Signs/Intake and Output


Vital Signs (last 24 hours): 


                                        











Temp Pulse Resp BP Pulse Ox


 


 98.2 F   60   18   145/75   93 L


 


 03/18/19 12:09  03/18/19 12:09  03/18/19 12:09  03/18/19 12:09  03/18/19 12:09











- Medications


Medications: 


                               Current Medications





Albuterol/Ipratropium (Duoneb 3 Mg/0.5 Mg (3 Ml) Ud)  3 ml INH RQ4 [facility]


   Last Admin: 03/18/19 11:09 Dose:  3 ml


Alprazolam (Xanax)  1 mg PO BID [facility]


   Last Admin: 03/18/19 09:53 Dose:  1 mg


Dextrose (Glutose 15)  0 gm PO ONCE PRN; Protocol


   PRN Reason: Hypoglycemia Protocol


Dextrose (Dextrose 50% Inj)  0 ml IV STAT PRN; Protocol


   PRN Reason: Hypoglycemia Protocol


Enalapril Maleate (Vasotec)  10 mg PO DAILY [facility]


   Last Admin: 03/18/19 10:28 Dose:  10 mg


Escitalopram Oxalate (Lexapro)  20 mg PO DAILY [facility]


   Last Admin: 03/18/19 09:51 Dose:  20 mg


Folic Acid (Folic Acid)  1 mg PO DAILY [facility]


   Last Admin: 03/18/19 09:54 Dose:  1 mg


Furosemide (Lasix)  40 mg IVP DAILY [facility]


   Last Admin: 03/18/19 09:54 Dose:  40 mg


Gabapentin (Neurontin)  400 mg PO TID [facility]


   Last Admin: 03/18/19 09:51 Dose:  400 mg


Glucagon (Glucagen Diagnostic Kit)  0 mg IM STAT PRN; Protocol


   PRN Reason: Hypoglycemia Protocol


Levofloxacin/Dextrose (Levaquin 750mg)  750 mg in 150 mls @ 150 mls/hr IVPB 

DAILY@2100 [facility]


Insulin Human Lispro (Humalog)  0 units SC ACCU-CHECK [facility]; Protocol


   Last Admin: 03/18/19 06:36 Dose:  2 units


Methylprednisolone (Solu-Medrol)  40 mg IVP Q8@0300,1100,1900 [facility]


   Last Admin: 03/18/19 10:00 Dose:  40 mg


Nicotine (Nicoderm Cq)  1 patch TD DAILY [facility]


   Last Admin: 03/18/19 09:56 Dose:  1 patch


Nifedipine (Procardia Xl)  90 mg PO DAILY [facility]


   Last Admin: 03/18/19 09:56 Dose:  90 mg


Oxycodone HCl (Oxycodone Immediate Release Tab)  30 mg PO Q6H PRN


   PRN Reason: Pain, severe (8-10)


   Last Admin: 03/18/19 08:20 Dose:  30 mg


Pantoprazole Sodium (Protonix Ec Tab)  40 mg PO DAILY [facility]


   Last Admin: 03/18/19 09:56 Dose:  40 mg


Potassium Chloride (K-Dur 20 Meq Er Tab)  20 meq PO DAILY [facility]


   Last Admin: 03/18/19 09:57 Dose:  20 meq


Rivaroxaban (Xarelto)  20 mg PO DAILY [facility]; Protocol


   Last Admin: 03/18/19 09:51 Dose:  20 mg


Sitagliptin Phosphate (Januvia)  100 mg PO DAILY [facility]


   Last Admin: 03/18/19 09:54 Dose:  100 mg


Topiramate (Topamax)  100 mg PO BID [facility]


   Last Admin: 03/18/19 09:51 Dose:  100 mg


Trazodone HCl (Desyrel)  200 mg PO [facility]


Zolpidem Tartrate (Ambien)  5 mg PO [facility]


   Last Admin: 03/18/19 00:22 Dose:  5 mg











- Labs


Labs: 


                                        





                                 03/17/19 20:50 





                                 03/17/19 20:50 











- Constitutional


Appears: Well, Non-toxic, No Acute Distress





- Head Exam


Head Exam: ATRAUMATIC, NORMAL INSPECTION, NORMOCEPHALIC





- Eye Exam


Eye Exam: EOMI, Normal appearance


Pupil Exam: NORMAL ACCOMODATION, PERRL





- ENT Exam


ENT Exam: Mucous Membranes Moist, Normal Exam





- Neck Exam


Neck Exam: Full ROM, Normal Inspection





- Respiratory Exam


Respiratory Exam: NORMAL BREATHING PATTERN


Additional comments: 





Ronchi B/L more on LEFT 





- Cardiovascular Exam


Cardiovascular Exam: REGULAR RHYTHM, +S1, +S2





- GI/Abdominal Exam


GI & Abdominal Exam: Soft, Normal Bowel Sounds


Additional comments: 





Tender upon palpation of the abdomen 





- Extremities Exam


Extremities Exam: Full ROM, Normal Capillary Refill, Normal Inspection





- Back Exam


Back Exam: NORMAL INSPECTION





- Neurological Exam


Neurological Exam: Alert, Awake, CN II-XII Intact, Normal Gait, Oriented x3





- Psychiatric Exam


Psychiatric exam: Normal Affect, Normal Mood





- Skin


Skin Exam: Dry, Intact, Normal Color, Warm





Assessment and Plan





- Assessment and Plan (Free Text)


Assessment: 


59 yo f with a PMHx of HTn, DM2, HLD, COPD, RUE DVT, depression and asthma was 

admitted for evaluation and management of dyspnea. 


CXR: superimposed pneumonia cannot be excluded, +/- atelactasis 


Echo: Ej 55-60%, LEFT atrium mod  dilated, with mitral regurgitation. 


PLAN: 





Dyspnea


RLL pneumonia vs preserve CHF vs COPD exacerbation


Afebrile, VSS, no leukocytosis


PE less likely since no acute changes in EKG. 


Continuous cardiac monitoring


Pro-BNP 3,510-high; troponin neg x1.


C/w O2 by NC at 2 L/min


Levofloxacin 750mg IV daily


Duonebs MAT Q4H, Methylprednisolone 40mg IV Q8H


Lasix 40mg IV daily


F/U labs: pro-calcitonin, sputum Cx, CBC, CMP, urine Strep pneumo.





abdominal pain and Diarrhea 


C diff screen ordered.


Under observation. 


legionella pending


Morpine prn pain





HTN


Elevated, uncontrolled, possible due to anxiety 


Home meds resume. 





DM 2


uncontrolled, Glucose 211 


Insulin Sliding scale


Hypoglycemia protocol


Home meds resumed. 





Depression


Home meds resumed





DVT Prophylaxis


Hx of previous DVT


On Xarelto 





Follow up with PT 





Diabetic Diet


Full code

## 2019-03-18 NOTE — CARD
--------------- APPROVED REPORT --------------





Date of service: 03/17/2019



EKG Measurement

Heart [id number]

ID [id number]

[id number]

[id number]

[id number]



<Conclusion>

Normal sinus rhythm

Normal ECG

## 2019-03-18 NOTE — CARD
--------------- APPROVED REPORT --------------





Date of service: 03/17/2019



EKG Measurement

Heart [id number]

DE [id number]

[id number]

[id number]

[id number]



<Conclusion>

Sinus rhythm with premature supraventricular complexes

Otherwise normal ECG

## 2019-03-19 LAB
ARTERIAL BLOOD GAS HEMOGLOBIN: 12.1 G/DL (ref 11.7–17.4)
ARTERIAL BLOOD GAS O2 SAT: 96.6 % (ref 95–98)
ARTERIAL BLOOD GAS PCO2: 40 MM/HG (ref 35–45)
ARTERIAL BLOOD GAS TCO2: 26 MMOL/L (ref 22–28)
ARTERIAL PATENCY WRIST A: YES
BUN SERPL-MCNC: 22 MG/DL (ref 7–17)
CALCIUM SERPL-MCNC: 9.2 MG/DL (ref 8.4–10.2)
ERYTHROCYTE [DISTWIDTH] IN BLOOD BY AUTOMATED COUNT: 15.9 % (ref 11.5–14.5)
GFR NON-AFRICAN AMERICAN: 51
HCO3 BLDA-SCNC: 24.9 MMOL/L (ref 21–28)
HGB BLD-MCNC: 11.8 G/DL (ref 12–16)
INHALED O2 CONCENTRATION: 32 %
MCH RBC QN AUTO: 29.4 PG (ref 27–31)
MCHC RBC AUTO-ENTMCNC: 32.8 G/DL (ref 33–37)
MCV RBC AUTO: 89.8 FL (ref 81–99)
O2 CAP BLDA-SCNC: 16.6 ML/DL (ref 16–24)
O2 CT BLDA-SCNC: 16 ML/DL (ref 15–23)
PH BLDA: 7.4 [PH] (ref 7.35–7.45)
PLATELET # BLD: 214 K/UL (ref 130–400)
PO2 BLDA: 76 MM/HG (ref 80–100)
RBC # BLD AUTO: 3.99 MIL/UL (ref 3.8–5.2)
WBC # BLD AUTO: 7.4 K/UL (ref 4.8–10.8)

## 2019-03-19 RX ADMIN — METHYLPREDNISOLONE SODIUM SUCCINATE SCH MG: 40 INJECTION, POWDER, FOR SOLUTION INTRAMUSCULAR; INTRAVENOUS at 19:38

## 2019-03-19 RX ADMIN — IPRATROPIUM BROMIDE AND ALBUTEROL SULFATE SCH ML: .5; 3 SOLUTION RESPIRATORY (INHALATION) at 11:17

## 2019-03-19 RX ADMIN — IPRATROPIUM BROMIDE AND ALBUTEROL SULFATE SCH ML: .5; 3 SOLUTION RESPIRATORY (INHALATION) at 07:49

## 2019-03-19 RX ADMIN — WATER SCH MLS/HR: 1 INJECTION INTRAMUSCULAR; INTRAVENOUS; SUBCUTANEOUS at 16:53

## 2019-03-19 RX ADMIN — METHYLPREDNISOLONE SODIUM SUCCINATE SCH MG: 40 INJECTION, POWDER, FOR SOLUTION INTRAMUSCULAR; INTRAVENOUS at 11:56

## 2019-03-19 RX ADMIN — PANTOPRAZOLE SODIUM SCH MG: 40 TABLET, DELAYED RELEASE ORAL at 09:41

## 2019-03-19 RX ADMIN — IPRATROPIUM BROMIDE AND ALBUTEROL SULFATE SCH ML: .5; 3 SOLUTION RESPIRATORY (INHALATION) at 16:04

## 2019-03-19 RX ADMIN — WATER SCH MLS/HR: 1 INJECTION INTRAMUSCULAR; INTRAVENOUS; SUBCUTANEOUS at 03:53

## 2019-03-19 RX ADMIN — IPRATROPIUM BROMIDE AND ALBUTEROL SULFATE SCH ML: .5; 3 SOLUTION RESPIRATORY (INHALATION) at 19:06

## 2019-03-19 RX ADMIN — INSULIN LISPRO SCH UNITS: 100 INJECTION, SOLUTION INTRAVENOUS; SUBCUTANEOUS at 11:51

## 2019-03-19 RX ADMIN — INSULIN LISPRO SCH: 100 INJECTION, SOLUTION INTRAVENOUS; SUBCUTANEOUS at 16:54

## 2019-03-19 RX ADMIN — WATER SCH MLS/HR: 1 INJECTION INTRAMUSCULAR; INTRAVENOUS; SUBCUTANEOUS at 08:00

## 2019-03-19 RX ADMIN — NIFEDIPINE SCH MG: 90 TABLET, FILM COATED, EXTENDED RELEASE ORAL at 09:31

## 2019-03-19 RX ADMIN — METHYLPREDNISOLONE SODIUM SUCCINATE SCH MG: 40 INJECTION, POWDER, FOR SOLUTION INTRAMUSCULAR; INTRAVENOUS at 03:43

## 2019-03-19 RX ADMIN — IPRATROPIUM BROMIDE AND ALBUTEROL SULFATE SCH ML: .5; 3 SOLUTION RESPIRATORY (INHALATION) at 03:41

## 2019-03-19 RX ADMIN — INSULIN LISPRO SCH UNITS: 100 INJECTION, SOLUTION INTRAVENOUS; SUBCUTANEOUS at 23:59

## 2019-03-19 RX ADMIN — CIPROFLOXACIN SCH MLS/HR: 2 INJECTION, SOLUTION INTRAVENOUS at 09:33

## 2019-03-19 RX ADMIN — POTASSIUM CHLORIDE SCH MEQ: 20 TABLET, EXTENDED RELEASE ORAL at 09:31

## 2019-03-19 RX ADMIN — INSULIN LISPRO SCH: 100 INJECTION, SOLUTION INTRAVENOUS; SUBCUTANEOUS at 06:51

## 2019-03-19 RX ADMIN — WATER SCH MLS/HR: 1 INJECTION INTRAMUSCULAR; INTRAVENOUS; SUBCUTANEOUS at 22:39

## 2019-03-19 RX ADMIN — OXYCODONE HYDROCHLORIDE PRN MG: 10 TABLET ORAL at 16:59

## 2019-03-19 RX ADMIN — CIPROFLOXACIN SCH MLS/HR: 2 INJECTION, SOLUTION INTRAVENOUS at 20:16

## 2019-03-19 NOTE — CP.PCM.PN
Subjective





- Date & Time of Evaluation


Date of Evaluation: 03/19/19


Time of Evaluation: 11:20





- Subjective


Subjective: 





F/U  PNA/COPD Exacerbation





No SOB with O2, c/o of dry cough, no bringing up any phlegm but breathing better

than yesterday.





Objective





- Vital Signs/Intake and Output


Vital Signs (last 24 hours): 


                                        











Temp Pulse Resp BP Pulse Ox


 


 97.3 F L  58 L  18   110/68   93 L


 


 03/19/19 12:00  03/19/19 12:00  03/19/19 12:00  03/19/19 12:00  03/19/19 12:00











- Medications


Medications: 


                               Current Medications





Albuterol/Ipratropium (Duoneb 3 Mg/0.5 Mg (3 Ml) Ud)  3 ml INH RQ4 [facility]


   Last Admin: 03/19/19 11:17 Dose:  3 ml


Alprazolam (Xanax)  1 mg PO BID [facility]


   Last Admin: 03/19/19 09:30 Dose:  1 mg


Dextrose (Glutose 15)  0 gm PO ONCE PRN; Protocol


   PRN Reason: Hypoglycemia Protocol


Dextrose (Dextrose 50% Inj)  0 ml IV STAT PRN; Protocol


   PRN Reason: Hypoglycemia Protocol


Escitalopram Oxalate (Lexapro)  20 mg PO DAILY [facility]


   Last Admin: 03/19/19 09:31 Dose:  20 mg


Folic Acid (Folic Acid)  1 mg PO DAILY [facility]


   Last Admin: 03/19/19 09:31 Dose:  1 mg


Gabapentin (Neurontin)  400 mg PO TID [facility]


   Last Admin: 03/19/19 12:00 Dose:  400 mg


Glucagon (Glucagen Diagnostic Kit)  0 mg IM STAT PRN; Protocol


   PRN Reason: Hypoglycemia Protocol


Guaifenesin/Dextromethorphan (Robitussin Dm)  10 ml PO Q4 PRN


   PRN Reason: Cough


Vancomycin HCl 1 gm/ Sodium (Chloride)  250 mls @ 166.667 mls/hr IVPB Q12 [facility]; 

Protocol


   Last Admin: 03/19/19 09:33 Dose:  166.667 mls/hr


Piperacillin Sod/Tazobactam (Sod 3.375 gm/ Sodium Chloride)  100 mls @ 100 

mls/hr IVPB Q6 [facility]; Protocol


   Last Admin: 03/19/19 08:00 Dose:  100 mls/hr


Ciprofloxacin (Cipro 400mg/200ml Dsw)  400 mg in 200 mls @ 200 mls/hr IVPB Q12 

[facility]; Protocol


   Last Admin: 03/19/19 09:33 Dose:  200 mls/hr


Insulin Human Lispro (Humalog)  0 units SC ACCU-CHECK [facility]; Protocol


   Last Admin: 03/19/19 11:51 Dose:  6 units


Methylprednisolone (Solu-Medrol)  40 mg IVP Q8@0300,1100,1900 [facility]


   Last Admin: 03/19/19 11:56 Dose:  40 mg


Nicotine (Nicoderm Cq)  1 patch TD DAILY [facility]


   Last Admin: 03/19/19 09:30 Dose:  1 patch


Nifedipine (Procardia Xl)  90 mg PO DAILY [facility]


   Last Admin: 03/19/19 09:31 Dose:  90 mg


Oxycodone HCl (Oxycodone Immediate Release Tab)  30 mg PO Q6H PRN


   PRN Reason: Pain, severe (8-10)


   Last Admin: 03/18/19 21:37 Dose:  30 mg


Pantoprazole Sodium (Protonix Ec Tab)  40 mg PO DAILY [facility]


   Last Admin: 03/19/19 09:41 Dose:  40 mg


Potassium Chloride (K-Dur 20 Meq Er Tab)  20 meq PO DAILY [facility]


   Last Admin: 03/19/19 09:31 Dose:  20 meq


Rivaroxaban (Xarelto)  20 mg PO DAILY [facility]; Protocol


   Last Admin: 03/19/19 09:32 Dose:  20 mg


Sitagliptin Phosphate (Januvia)  100 mg PO DAILY [facility]


   Last Admin: 03/19/19 09:30 Dose:  100 mg


Topiramate (Topamax)  100 mg PO BID [facility]


   Last Admin: 03/19/19 09:32 Dose:  100 mg


Trazodone HCl (Desyrel)  200 mg PO HS [facility]


   Last Admin: 03/18/19 21:39 Dose:  200 mg


Zolpidem Tartrate (Ambien)  5 mg PO HS [facility]


   Last Admin: 03/18/19 23:11 Dose:  5 mg











- Labs


Labs: 


                                        





                                 03/19/19 04:35 





                                 03/19/19 04:35 





                                        











PT  23.8 Seconds (9.8-13.1)  H  03/18/19  13:50    


 


INR  2.1   03/18/19  13:50    


 


APTT  36.1 Seconds (25.6-37.1)   03/18/19  13:50    














- Constitutional


Appears: No Acute Distress





- Head Exam


Head Exam: NORMAL INSPECTION





- Eye Exam


Eye Exam: PERRL





- ENT Exam


ENT Exam: Normal Exam





- Neck Exam


Neck Exam: Normal Inspection





- Respiratory Exam


Respiratory Exam: Decreased Breath Sounds (at bases, few rhonchi at bases)





- Cardiovascular Exam


Cardiovascular Exam: REGULAR RHYTHM





- GI/Abdominal Exam


GI & Abdominal Exam: Soft, Normal Bowel Sounds





- Extremities Exam


Extremities Exam: Tenderness (R knee)





- Neurological Exam


Additional comments: 





no focal motor/sensory deficit





- Psychiatric Exam


Psychiatric exam: Anxious





Assessment and Plan


(1) PNA (pneumonia)


Status: Acute   





(2) COPD exacerbation


Status: Chronic   





- Assessment and Plan (Free Text)


Plan: 





Continue O2 NC 2 L/M, current abxs, Solu-Medrol, Duoneb and rest of tx.

## 2019-03-19 NOTE — CP.PCM.CON
History of Present Illness





- History of Present Illness


History of Present Illness: 





59 y/o F, PMHx:  COPD, Asthma, Bronchitis, PE b/l on 10/21/2013. Chronic smoker,




admitted on 03/17/19 to [facility], [place] due to severe SOB, i


ncreased from 2-3 days PTA, associated to dry cough, 


at times productive with difficulty to expectorate thick sputum, 


nasal congestion, runny nose. 


Found to have Pneumonia





Pt recently had Right TKR in January


has been in [facility] until 2 days PTA





 PMH:


COPD


Asthma


Smoker


* Benign hypertension 


* Mixed hyperlipidemia 


* Diabetes mellitus Type II


* Chronic obstructive lung disease 


* Chronic anxiety 


* Depressive disorder 


* vena cava filter 


* Chronic gastritis 


* Chronic colitis 


* T9 fracture, sequela 


* T11 fracture, sequela 


* T12 fracture, sequela 


* Closed fracture of L4 


* Smokes 





BNP: 3510





EKG: NSR





Echo:


Good LV function


EF: 55 - 60%





Past Patient History





- Infectious Disease


Hx of Infectious Diseases: None





- Tetanus Immunizations


Tetanus Immunization: Unknown





- Past Medical History & Family History


Past Medical History?: Yes





- Past Social History


Smoking Status: Light Smoker < 10 Cigarettes Daily


Alcohol: None


Drugs: Denies


Home Situation {Lives}: Alone





- CARDIAC


Hx Cardiac Disorders: Yes


Hx Hypercholesterolemia: Yes


Hx Hypertension: Yes





- PULMONARY


Hx Respiratory Disorders: Yes


Hx Asthma: Yes


Hx Bronchitis: Yes


Hx Chronic Obstructive Pulmonary Disease (COPD): Yes





- NEUROLOGICAL


Hx Neurological Disorder: No





- HEENT


Hx HEENT Problems: Yes (Use eyeglases)





- RENAL


Hx Chronic Kidney Disease: No





- ENDOCRINE/METABOLIC


Hx Endocrine Disorders: Yes


Hx Diabetes Mellitus Type 2: Yes





- HEMATOLOGICAL/ONCOLOGICAL


Hx Blood Disorders: No


Hx Human Immunodeficiency Virus (HIV): No


Other/Comment: DVT-righr upper ext





- INTEGUMENTARY


Hx Dermatological Problems: No





- MUSCULOSKELETAL/RHEUMATOLOGICAL


Hx Musculoskeletal Disorders: Yes


Hx Arthritis: Yes


Hx Back Pain: Yes


Hx Falls: Yes





- GASTROINTESTINAL


Hx Gastrointestinal Disorders: No


Hx Ulcer: No





- GENITOURINARY/GYNECOLOGICAL


Hx Genitourinary Disorders: No





- PSYCHIATRIC


Hx Psychophysiologic Disorder: Yes


Hx Anxiety: Yes


Hx Depression: Yes





- SURGICAL HISTORY


Hx Surgeries: Yes


Hx Orthopedic Surgery: Yes (right knee replecement,left ankle surgery)


Hx Tonsillectomy: Yes





- ANESTHESIA


Hx Anesthesia: Yes


Hx Anesthesia Reactions: No


Hx Malignant Hyperthermia: No


Has any member of the family had a problem w/ anesthesia?: No





Meds


Allergies/Adverse Reactions: 


                                    Allergies











Allergy/AdvReac Type Severity Reaction Status Date / Time


 


No Known Allergies Allergy   Verified 01/11/19 06:29














- Medications


Medications: 


                               Current Medications





Albuterol/Ipratropium (Duoneb 3 Mg/0.5 Mg (3 Ml) Ud)  3 ml INH RQ4 [facility]


   Last Admin: 03/19/19 07:49 Dose:  3 ml


Alprazolam (Xanax)  1 mg PO BID [facility]


   Last Admin: 03/19/19 09:30 Dose:  1 mg


Dextrose (Glutose 15)  0 gm PO ONCE PRN; Protocol


   PRN Reason: Hypoglycemia Protocol


Dextrose (Dextrose 50% Inj)  0 ml IV STAT PRN; Protocol


   PRN Reason: Hypoglycemia Protocol


Escitalopram Oxalate (Lexapro)  20 mg PO DAILY [facility]


   Last Admin: 03/19/19 09:31 Dose:  20 mg


Folic Acid (Folic Acid)  1 mg PO DAILY [facility]


   Last Admin: 03/19/19 09:31 Dose:  1 mg


Gabapentin (Neurontin)  400 mg PO TID [facility]


   Last Admin: 03/19/19 09:31 Dose:  400 mg


Glucagon (Glucagen Diagnostic Kit)  0 mg IM STAT PRN; Protocol


   PRN Reason: Hypoglycemia Protocol


Guaifenesin/Dextromethorphan (Robitussin Dm)  10 ml PO Q4 PRN


   PRN Reason: Cough


Vancomycin HCl 1 gm/ Sodium (Chloride)  250 mls @ 166.667 mls/hr IVPB Q12 [facility]; 

Protocol


   Last Admin: 03/19/19 09:33 Dose:  166.667 mls/hr


Piperacillin Sod/Tazobactam (Sod 3.375 gm/ Sodium Chloride)  100 mls @ 100 

mls/hr IVPB Q6 [facility]; Protocol


   Last Admin: 03/19/19 08:00 Dose:  100 mls/hr


Ciprofloxacin (Cipro 400mg/200ml Dsw)  400 mg in 200 mls @ 200 mls/hr IVPB Q12 

[facility]; Protocol


   Last Admin: 03/19/19 09:33 Dose:  200 mls/hr


Insulin Human Lispro (Humalog)  0 units SC ACCU-CHECK MAT; Protocol


   Last Admin: 03/19/19 06:51 Dose:  Not Given


Methylprednisolone (Solu-Medrol)  40 mg IVP Q8@0300,1100,1900 [facility]


   Last Admin: 03/19/19 03:43 Dose:  40 mg


Nicotine (Nicoderm Cq)  1 patch TD DAILY [facility]


   Last Admin: 03/19/19 09:30 Dose:  1 patch


Nifedipine (Procardia Xl)  90 mg PO DAILY [facility]


   Last Admin: 03/19/19 09:31 Dose:  90 mg


Oxycodone HCl (Oxycodone Immediate Release Tab)  30 mg PO Q6H PRN


   PRN Reason: Pain, severe (8-10)


   Last Admin: 03/18/19 21:37 Dose:  30 mg


Pantoprazole Sodium (Protonix Ec Tab)  40 mg PO DAILY [facility]


   Last Admin: 03/19/19 09:41 Dose:  40 mg


Potassium Chloride (K-Dur 20 Meq Er Tab)  20 meq PO DAILY [facility]


   Last Admin: 03/19/19 09:31 Dose:  20 meq


Rivaroxaban (Xarelto)  20 mg PO DAILY [facility]; Protocol


   Last Admin: 03/19/19 09:32 Dose:  20 mg


Sitagliptin Phosphate (Januvia)  100 mg PO DAILY [facility]


   Last Admin: 03/19/19 09:30 Dose:  100 mg


Topiramate (Topamax)  100 mg PO BID [facility]


   Last Admin: 03/19/19 09:32 Dose:  100 mg


Trazodone HCl (Desyrel)  200 mg PO [facility]


   Last Admin: 03/18/19 21:39 Dose:  200 mg


Zolpidem Tartrate (Ambien)  5 mg PO HS [facility]


   Last Admin: 03/18/19 23:11 Dose:  5 mg











Physical Exam





- Constitutional


Appears: Well





- Head Exam


Head Exam: NORMAL INSPECTION





- Eye Exam


Eye Exam: Normal appearance





- ENT Exam


ENT Exam: Normal Exam





- Respiratory Exam


Respiratory Exam: Decreased Breath Sounds, Rales, Rhonchi





- Cardiovascular Exam


Cardiovascular Exam: REGULAR RHYTHM





Results





- Vital Signs


Recent Vital Signs: 


                                Last Vital Signs











Temp  97.2 F L  03/19/19 08:00


 


Pulse  63   03/19/19 08:00


 


Resp  18   03/19/19 08:00


 


BP  104/65   03/19/19 08:00


 


Pulse Ox  98   03/19/19 08:00














- Labs


Result Diagrams: 


                                 03/19/19 04:35





                                 03/19/19 04:35


Labs: 


                         Laboratory Results - last 24 hr











  03/18/19 03/18/19 03/18/19





  11:15 13:50 13:50


 


WBC   4.9 


 


RBC   4.51 


 


Hgb   13.1 


 


Hct   40.9 


 


MCV   90.6 


 


MCH   29.0 


 


MCHC   32.0 L 


 


RDW   16.2 H 


 


Plt Count   192 


 


MPV   9.2 


 


Neut % (Auto)   76.5 H 


 


Lymph % (Auto)   16.8 L 


 


Mono % (Auto)   6.5 


 


Eos % (Auto)   0.0 


 


Baso % (Auto)   0.2 


 


Neut # (Auto)   3.8 


 


Lymph # (Auto)   0.8 L 


 


Mono # (Auto)   0.3 


 


Eos # (Auto)   0.0 


 


Baso # (Auto)   0.0 


 


PT   


 


INR   


 


APTT   


 


pCO2   


 


pO2   


 


HCO3   


 


ABG pH   


 


ABG Total CO2   


 


ABG O2 Saturation   


 


ABG O2 Content   


 


ABG Base Excess   


 


ABG Hemoglobin   


 


ABG Carboxyhemoglobin   


 


POC ABG HHb (Measured)   


 


ABG Methemoglobin   


 


ABG O2 Capacity   


 


Esteban Test   


 


A-a O2 Difference   


 


Hgb O2 Saturation   


 


Vent Mode   


 


FiO2   


 


Sodium    140


 


Potassium    3.6


 


Chloride    95 L


 


Carbon Dioxide    27


 


Anion Gap    22 H


 


BUN    11


 


Creatinine    0.7


 


Est GFR ( Amer)    > 60


 


Est GFR (Non-Af Amer)    > 60


 


POC Glucose (mg/dL)  211 H  


 


Random Glucose    201 H


 


Calcium    10.2


 


Phosphorus    4.2


 


Magnesium    2.1


 


Total Bilirubin    0.4


 


AST    16


 


ALT    14


 


Alkaline Phosphatase    93


 


Total Protein    8.3 H


 


Albumin    4.3


 


Globulin    4.0 H


 


Albumin/Globulin Ratio    1.1














  03/18/19 03/18/19 03/18/19





  13:50 16:28 21:26


 


WBC   


 


RBC   


 


Hgb   


 


Hct   


 


MCV   


 


MCH   


 


MCHC   


 


RDW   


 


Plt Count   


 


MPV   


 


Neut % (Auto)   


 


Lymph % (Auto)   


 


Mono % (Auto)   


 


Eos % (Auto)   


 


Baso % (Auto)   


 


Neut # (Auto)   


 


Lymph # (Auto)   


 


Mono # (Auto)   


 


Eos # (Auto)   


 


Baso # (Auto)   


 


PT  23.8 H  


 


INR  2.1  


 


APTT  36.1  


 


pCO2   


 


pO2   


 


HCO3   


 


ABG pH   


 


ABG Total CO2   


 


ABG O2 Saturation   


 


ABG O2 Content   


 


ABG Base Excess   


 


ABG Hemoglobin   


 


ABG Carboxyhemoglobin   


 


POC ABG HHb (Measured)   


 


ABG Methemoglobin   


 


ABG O2 Capacity   


 


Esteban Test   


 


A-a O2 Difference   


 


Hgb O2 Saturation   


 


Vent Mode   


 


FiO2   


 


Sodium   


 


Potassium   


 


Chloride   


 


Carbon Dioxide   


 


Anion Gap   


 


BUN   


 


Creatinine   


 


Est GFR ( Amer)   


 


Est GFR (Non-Af Amer)   


 


POC Glucose (mg/dL)   160 H  148 H


 


Random Glucose   


 


Calcium   


 


Phosphorus   


 


Magnesium   


 


Total Bilirubin   


 


AST   


 


ALT   


 


Alkaline Phosphatase   


 


Total Protein   


 


Albumin   


 


Globulin   


 


Albumin/Globulin Ratio   














  03/19/19 03/19/19 03/19/19





  04:35 04:35 05:28


 


WBC  7.4  D  


 


RBC  3.99  


 


Hgb  11.8 L  


 


Hct  35.9  


 


MCV  89.8  


 


MCH  29.4  


 


MCHC  32.8 L  


 


RDW  15.9 H  


 


Plt Count  214  


 


MPV   


 


Neut % (Auto)   


 


Lymph % (Auto)   


 


Mono % (Auto)   


 


Eos % (Auto)   


 


Baso % (Auto)   


 


Neut # (Auto)   


 


Lymph # (Auto)   


 


Mono # (Auto)   


 


Eos # (Auto)   


 


Baso # (Auto)   


 


PT   


 


INR   


 


APTT   


 


pCO2   


 


pO2   


 


HCO3   


 


ABG pH   


 


ABG Total CO2   


 


ABG O2 Saturation   


 


ABG O2 Content   


 


ABG Base Excess   


 


ABG Hemoglobin   


 


ABG Carboxyhemoglobin   


 


POC ABG HHb (Measured)   


 


ABG Methemoglobin   


 


ABG O2 Capacity   


 


Esteban Test   


 


A-a O2 Difference   


 


Hgb O2 Saturation   


 


Vent Mode   


 


FiO2   


 


Sodium   137 


 


Potassium   3.6 


 


Chloride   102 


 


Carbon Dioxide   24 


 


Anion Gap   15 


 


BUN   22 H 


 


Creatinine   1.1 


 


Est GFR ( Amer)   > 60 


 


Est GFR (Non-Af Amer)   51 


 


POC Glucose (mg/dL)    143 H


 


Random Glucose   149 H 


 


Calcium   9.2 


 


Phosphorus   


 


Magnesium   


 


Total Bilirubin   


 


AST   


 


ALT   


 


Alkaline Phosphatase   


 


Total Protein   


 


Albumin   


 


Globulin   


 


Albumin/Globulin Ratio   














  03/19/19





  06:24


 


WBC 


 


RBC 


 


Hgb 


 


Hct 


 


MCV 


 


MCH 


 


MCHC 


 


RDW 


 


Plt Count 


 


MPV 


 


Neut % (Auto) 


 


Lymph % (Auto) 


 


Mono % (Auto) 


 


Eos % (Auto) 


 


Baso % (Auto) 


 


Neut # (Auto) 


 


Lymph # (Auto) 


 


Mono # (Auto) 


 


Eos # (Auto) 


 


Baso # (Auto) 


 


PT 


 


INR 


 


APTT 


 


pCO2  40


 


pO2  76 L


 


HCO3  24.9


 


ABG pH  7.40


 


ABG Total CO2  26.0


 


ABG O2 Saturation  96.6


 


ABG O2 Content  16.0


 


ABG Base Excess  0


 


ABG Hemoglobin  12.1


 


ABG Carboxyhemoglobin  1.8 H


 


POC ABG HHb (Measured)  3.3


 


ABG Methemoglobin  1.1


 


ABG O2 Capacity  16.6


 


Esteban Test  Yes


 


A-a O2 Difference  102.0


 


Hgb O2 Saturation  93.9 L


 


Vent Mode  3lnc


 


FiO2  32.0


 


Sodium 


 


Potassium 


 


Chloride 


 


Carbon Dioxide 


 


Anion Gap 


 


BUN 


 


Creatinine 


 


Est GFR ( Amer) 


 


Est GFR (Non-Af Amer) 


 


POC Glucose (mg/dL) 


 


Random Glucose 


 


Calcium 


 


Phosphorus 


 


Magnesium 


 


Total Bilirubin 


 


AST 


 


ALT 


 


Alkaline Phosphatase 


 


Total Protein 


 


Albumin 


 


Globulin 


 


Albumin/Globulin Ratio 














Assessment & Plan


(1) PNA (pneumonia)


Assessment and Plan: 


Cardiac wise the patient is stable


Status: Acute   Priority: High   





(2) COPD exacerbation


Status: Chronic   Priority: High   





(3) Hyperlipidemia


Status: Acute

## 2019-03-19 NOTE — CT
Date of service: 03/18/2019



CTA chest PE protocol 



Indication: Pneumonia r/o PE



Technique: 



Contiguous axial images were obtained through the chest with 

intravenous contrast enhancement. Sagittal and coronal 

reconstructions were generated and reviewed. 



This CT exam was performed using 1 or more of the following dose 

reduction techniques: Automated exposure control, adjustment of the 

MAA and/or kV according to patient size, and/or use of iterative 

reconstruction technique. 



IV contrast: 76 mL Visipaque 320 IV







Radiation dose (DLP): 368.58 MGy-cm. 



Comparison: Chest x-ray performed 2/13/19



Findings: 



Visualized portions of the inferior thyroid gland appear 

unremarkable. The mediastinal and hilar vascular structures appear 

within normal limits.  The heart appears within normal limits of 

size.  Right hilar lymph nodes measuring approximately 13 mm in short 

axis.  Pretracheal middle lymph nodes measure up to approximately 6 

mm in short axis. 



No large central or segmental pulmonary embolus evident.



Subtle tree-in-bud like opacities within the right upper and 

posterior lateral right lower lobes.  Patchy infiltrate in the 

posterior left lower lobe consistent with pneumonia.  Left basilar 

atelectasis.  No pleural effusion. No pneumothorax. 



Limited visualized portions of the upper abdomen appear grossly 

unremarkable. 



Compression fracture deformities of T9 and T12.  Age indeterminate 

T11 compression fracture.  Osseous demineralization.  Degenerative 

changes. 



Impression: 



No large central or segmental pulmonary embolus identified.



Tree-in-bud like opacities as above may reflect infectious or 

inflammatory etiologies.



Right hilar adenopathy.  Pretracheal sub cm nodes, nonspecific.



Compression fracture deformities of T9 and T12 appear chronic.  Age 

indeterminate T11 compression fracture deformity. 



Preliminary impression was provided by [facility].

## 2019-03-19 NOTE — CT
Date of service: 03/18/2019



PROCEDURE:  CT HEAD WITHOUT CONTRAST.



HISTORY:

fall at home



COMPARISON:

Brain MRI without contrast performed 10/15/18



TECHNIQUE:

Axial computed tomography images were obtained through the head/brain 

without intravenous contrast.  



Radiation dose:



Total exam DLP = 715.77 mGy-cm.



This CT exam was performed using one or more of the following dose 

reduction techniques: Automated exposure control, adjustment of the 

mA and/or kV according to patient size, and/or use of iterative 

reconstruction technique.



FINDINGS:



HEMORRHAGE:

No intracranial hemorrhage. 



BRAIN:

Diffuse atrophy with prominence of the ventricles and sulci noted. No 

mass effect or edema.  The gray-white matter differentiation appears 

intact. Please note that MRI with diffusion imaging is more sensitive 

in the detection of acute ischemic event.



VENTRICLES:

No hydrocephalus. 



CALVARIUM:

Unremarkable. 



PARANASAL SINUSES:

Mucosal thickening of the maxillary sinuses, ethmoid air cells, right 

frontal sinus, and sphenoid sinuses. 



MASTOID AIR CELLS:

Unremarkable as visualized. No inflammatory changes.



OTHER FINDINGS:

None.



IMPRESSION:

No acute intracranial pathology identified. 



Mucosal thickening of the maxillary sinuses, ethmoid air cells, right 

frontal sinus, and sphenoid sinuses.  Correlate clinically for 

sinusitis. 



Preliminary impression was provided by [facility].

## 2019-03-19 NOTE — CP.PCM.PN
Subjective





- Date & Time of Evaluation


Date of Evaluation: 03/19/19


Time of Evaluation: 07:00





- Subjective


Subjective: 





Patient seen and examined at bedside today. No acute event overnight. Patient 

report abdominal pain have improved, she denies any diarrhea, she state that her

last BM on Sunday. Patient report mild cough and congestion, Otherwise she have 

no other complains. Patient denies chest pain, sob, abd pain,dysuria, polyuria. 





Objective





- Vital Signs/Intake and Output


Vital Signs (last 24 hours): 


                                        











Temp Pulse Resp BP Pulse Ox


 


 97.2 F L  50 L  17   94/59 L  100 


 


 03/19/19 05:21  03/19/19 05:21  03/19/19 05:21  03/19/19 05:21  03/19/19 05:21











- Medications


Medications: 


                               Current Medications





Albuterol/Ipratropium (Duoneb 3 Mg/0.5 Mg (3 Ml) Ud)  3 ml INH RQ4 [facility]


   Last Admin: 03/19/19 03:41 Dose:  3 ml


Alprazolam (Xanax)  1 mg PO BID [facility]


   Last Admin: 03/18/19 17:43 Dose:  1 mg


Dextrose (Glutose 15)  0 gm PO ONCE PRN; Protocol


   PRN Reason: Hypoglycemia Protocol


Dextrose (Dextrose 50% Inj)  0 ml IV STAT PRN; Protocol


   PRN Reason: Hypoglycemia Protocol


Enalapril Maleate (Vasotec)  10 mg PO DAILY [facility]


   Last Admin: 03/18/19 10:28 Dose:  10 mg


Escitalopram Oxalate (Lexapro)  20 mg PO DAILY [facility]


   Last Admin: 03/18/19 09:51 Dose:  20 mg


Folic Acid (Folic Acid)  1 mg PO DAILY [facility]


   Last Admin: 03/18/19 09:54 Dose:  1 mg


Furosemide (Lasix)  40 mg IVP DAILY [facility]


   Last Admin: 03/18/19 09:54 Dose:  40 mg


Gabapentin (Neurontin)  400 mg PO TID [facility]


   Last Admin: 03/18/19 13:26 Dose:  400 mg


Glucagon (Glucagen Diagnostic Kit)  0 mg IM STAT PRN; Protocol


   PRN Reason: Hypoglycemia Protocol


Vancomycin HCl 1 gm/ Sodium (Chloride)  250 mls @ 166.667 mls/hr IVPB Q12 [facility]; 

Protocol


   Last Admin: 03/18/19 21:27 Dose:  166.667 mls/hr


Piperacillin Sod/Tazobactam (Sod 3.375 gm/ Sodium Chloride)  100 mls @ 100 

mls/hr IVPB Q6 [facility]; Protocol


   Last Admin: 03/19/19 03:53 Dose:  100 mls/hr


Ciprofloxacin (Cipro 400mg/200ml Dsw)  400 mg in 200 mls @ 200 mls/hr IVPB Q12 

[facility]; Protocol


   Last Admin: 03/18/19 21:44 Dose:  200 mls/hr


Insulin Human Lispro (Humalog)  0 units SC ACCU-CHECK [facility]; Protocol


   Last Admin: 03/18/19 23:05 Dose:  Not Given


Methylprednisolone (Solu-Medrol)  40 mg IVP Q8@0300,1100,1900 [facility]


   Last Admin: 03/19/19 03:43 Dose:  40 mg


Nicotine (Nicoderm Cq)  1 patch TD DAILY [facility]


   Last Admin: 03/18/19 09:56 Dose:  1 patch


Nifedipine (Procardia Xl)  90 mg PO DAILY [facility]


   Last Admin: 03/18/19 09:56 Dose:  90 mg


Oxycodone HCl (Oxycodone Immediate Release Tab)  30 mg PO Q6H PRN


   PRN Reason: Pain, severe (8-10)


   Last Admin: 03/18/19 21:37 Dose:  30 mg


Pantoprazole Sodium (Protonix Ec Tab)  40 mg PO DAILY [facility]


   Last Admin: 03/18/19 09:56 Dose:  40 mg


Potassium Chloride (K-Dur 20 Meq Er Tab)  20 meq PO DAILY [facility]


   Last Admin: 03/18/19 09:57 Dose:  20 meq


Rivaroxaban (Xarelto)  20 mg PO DAILY [facility]; Protocol


   Last Admin: 03/18/19 09:51 Dose:  20 mg


Sitagliptin Phosphate (Januvia)  100 mg PO DAILY [facility]


   Last Admin: 03/18/19 09:54 Dose:  100 mg


Topiramate (Topamax)  100 mg PO BID [facility]


   Last Admin: 03/18/19 17:42 Dose:  100 mg


Trazodone HCl (Desyrel)  200 mg PO HS [facility]


   Last Admin: 03/18/19 21:39 Dose:  200 mg


Zolpidem Tartrate (Ambien)  5 mg PO HS [facility]


   Last Admin: 03/18/19 23:11 Dose:  5 mg











- Labs


Labs: 


                                        





                                 03/19/19 04:35 





                                 03/18/19 13:50 





                                        











PT  23.8 Seconds (9.8-13.1)  H  03/18/19  13:50    


 


INR  2.1   03/18/19  13:50    


 


APTT  36.1 Seconds (25.6-37.1)   03/18/19  13:50    














- Constitutional


Appears: Well, Non-toxic





- Head Exam


Head Exam: ATRAUMATIC, NORMAL INSPECTION, NORMOCEPHALIC





- Eye Exam


Eye Exam: EOMI, Normal appearance, PERRL


Pupil Exam: NORMAL ACCOMODATION, PERRL





- ENT Exam


ENT Exam: Mucous Membranes Moist, Normal Exam





- Neck Exam


Neck Exam: Full ROM





- Respiratory Exam


Respiratory Exam: Rales, Rhonchi, NORMAL BREATHING PATTERN


Additional comments: 





Rhonchi diffuse





- Cardiovascular Exam


Cardiovascular Exam: REGULAR RHYTHM, +S1, +S2





- GI/Abdominal Exam


GI & Abdominal Exam: Soft, Normal Bowel Sounds.  absent: Tenderness





- Extremities Exam


Extremities Exam: Full ROM





- Back Exam


Back Exam: NORMAL INSPECTION





- Neurological Exam


Neurological Exam: Alert, Awake, Oriented x3





- Psychiatric Exam


Psychiatric exam: Normal Affect, Normal Mood





- Skin


Skin Exam: Dry, Intact, Normal Color, Warm





Assessment and Plan





- Assessment and Plan (Free Text)


Assessment: 





Assessment: 


57 yo f with a PMHx of HTn, DM2, HLD, COPD, RUE DVT, depression and asthma was 

admitted for evaluation and management of dyspnea.





CXR: superimposed pneumonia cannot be excluded, +/- atelactasis 


Echo: Ej 55-60%, LEFT atrium mod  dilated, with mitral regurgitation. 


Head CT scan No cranial hemorrhage noted, But positive for sinusitis 


Ct chest scan Pending read





PLAN: 





Dyspnea


RLL pneumonia vs preserve acute  CHF vs COPD exacerbation


PE excluded 


Due to low blood pressure, will hold off Lasix 40mg


Continuous cardiac monitoring


Cardiology consulted: Cardiac wise patient is stable 


Pro-BNP 3,510-high; troponin neg x1.


Continue with O2 by NC at 2 L/min


Levofloxacin 750mg IV daily


Duonebs MAT Q4H, Methylprednisolone 40mg IV Q8H


F/U labs: pro-calcitonin,  urine Strep pneumo.


Start on Robetussin for cough and congestion 


F/U CT chest 





abdominal pain and Diarrhea 


Improved, no pain no diarrhea, last BM Sunday


C diff f/u


Under observation. 


legionella pending


Morpine prn pain





HTN


Blood pressure running on the low side.


D/C lasix and enalapril for now


Home meds resume. 





DM 2


controlled


Insulin Sliding scale


Hypoglycemia protocol


Home meds resumed. 





Depression


Home meds resumed





DVT Prophylaxis


Hx of previous DVT


On Xarelto 





Follow up PT recc 





Diabetic Diet


Full code

## 2019-03-20 VITALS
SYSTOLIC BLOOD PRESSURE: 113 MMHG | RESPIRATION RATE: 18 BRPM | DIASTOLIC BLOOD PRESSURE: 70 MMHG | TEMPERATURE: 98.5 F | HEART RATE: 56 BPM

## 2019-03-20 LAB
BUN SERPL-MCNC: 24 MG/DL (ref 7–17)
CALCIUM SERPL-MCNC: 9.2 MG/DL (ref 8.4–10.2)
ERYTHROCYTE [DISTWIDTH] IN BLOOD BY AUTOMATED COUNT: 16.1 % (ref 11.5–14.5)
GFR NON-AFRICAN AMERICAN: 46
HGB BLD-MCNC: 12.3 G/DL (ref 12–16)
MCH RBC QN AUTO: 29.5 PG (ref 27–31)
MCHC RBC AUTO-ENTMCNC: 32.6 G/DL (ref 33–37)
MCV RBC AUTO: 90.6 FL (ref 81–99)
PLATELET # BLD: 224 K/UL (ref 130–400)
RBC # BLD AUTO: 4.15 MIL/UL (ref 3.8–5.2)
WBC # BLD AUTO: 10.3 K/UL (ref 4.8–10.8)

## 2019-03-20 RX ADMIN — IPRATROPIUM BROMIDE AND ALBUTEROL SULFATE SCH ML: .5; 3 SOLUTION RESPIRATORY (INHALATION) at 08:06

## 2019-03-20 RX ADMIN — WATER SCH MLS/HR: 1 INJECTION INTRAMUSCULAR; INTRAVENOUS; SUBCUTANEOUS at 09:55

## 2019-03-20 RX ADMIN — CIPROFLOXACIN SCH MLS/HR: 2 INJECTION, SOLUTION INTRAVENOUS at 08:46

## 2019-03-20 RX ADMIN — POTASSIUM CHLORIDE SCH MEQ: 20 TABLET, EXTENDED RELEASE ORAL at 08:46

## 2019-03-20 RX ADMIN — IPRATROPIUM BROMIDE AND ALBUTEROL SULFATE SCH: .5; 3 SOLUTION RESPIRATORY (INHALATION) at 11:21

## 2019-03-20 RX ADMIN — INSULIN LISPRO SCH: 100 INJECTION, SOLUTION INTRAVENOUS; SUBCUTANEOUS at 06:51

## 2019-03-20 RX ADMIN — PANTOPRAZOLE SODIUM SCH MG: 40 TABLET, DELAYED RELEASE ORAL at 08:46

## 2019-03-20 RX ADMIN — WATER SCH MLS/HR: 1 INJECTION INTRAMUSCULAR; INTRAVENOUS; SUBCUTANEOUS at 04:15

## 2019-03-20 RX ADMIN — OXYCODONE HYDROCHLORIDE PRN MG: 10 TABLET ORAL at 04:28

## 2019-03-20 RX ADMIN — METHYLPREDNISOLONE SODIUM SUCCINATE SCH MG: 40 INJECTION, POWDER, FOR SOLUTION INTRAMUSCULAR; INTRAVENOUS at 04:00

## 2019-03-20 RX ADMIN — NIFEDIPINE SCH MG: 90 TABLET, FILM COATED, EXTENDED RELEASE ORAL at 08:46

## 2019-03-20 NOTE — CP.PCM.DIS
<Alberto Pratt - Last Filed: 03/20/19 15:54>





Provider





- Provider


Date of Admission: 


03/17/19 21:59





Attending physician: 


Charles Snyder





Consults: 








03/18/19 11:15


Cardiology Consult Routine 


   Comment: 


   Consulting Provider: Ronald Andrade


   Consulting Physician: Ronald Andrade


   Reason for Consult: SOB/Elevated BNP/   r/o CHF





03/18/19 11:17


Pulmonology Consult Routine 


   Comment: 


   Consulting Provider: Yoav Riggins


   Consulting Physician: Yoav Riggins


   Reason for Consult: COPD exacerbation











Time Spent in preparation of Discharge (in minutes): 25





Diagnosis





- Discharge Diagnosis


(1) Dyspnea


Status: Chronic   





Hospital Course





- Lab Results


Lab Results: 


                                  Micro Results





03/17/19 21:00   Blood   Blood Culture - Preliminary


                            NO GROWTH AFTER 48 HOURS


03/17/19 20:41   Blood   Blood Culture - Preliminary


                            NO GROWTH AFTER 48 HOURS





                             Most Recent Lab Values











WBC  10.3 K/uL (4.8-10.8)   03/20/19  05:40    


 


RBC  4.15 Mil/uL (3.80-5.20)   03/20/19  05:40    


 


Hgb  12.3 g/dL (12.0-16.0)   03/20/19  05:40    


 


Hct  37.6 % (34.0-47.0)   03/20/19  05:40    


 


MCV  90.6 fl (81.0-99.0)   03/20/19  05:40    


 


MCH  29.5 pg (27.0-31.0)   03/20/19  05:40    


 


MCHC  32.6 g/dL (33.0-37.0)  L  03/20/19  05:40    


 


RDW  16.1 % (11.5-14.5)  H  03/20/19  05:40    


 


Plt Count  224 K/uL (130-400)   03/20/19  05:40    


 


MPV  9.2 fl (7.2-11.7)   03/18/19  13:50    


 


Neut % (Auto)  76.5 % (50.0-75.0)  H  03/18/19  13:50    


 


Lymph % (Auto)  16.8 % (20.0-40.0)  L  03/18/19  13:50    


 


Mono % (Auto)  6.5 % (0.0-10.0)   03/18/19  13:50    


 


Eos % (Auto)  0.0 % (0.0-4.0)   03/18/19  13:50    


 


Baso % (Auto)  0.2 % (0.0-2.0)   03/18/19  13:50    


 


Neut # (Auto)  3.8 K/uL (1.8-7.0)   03/18/19  13:50    


 


Lymph # (Auto)  0.8 K/uL (1.0-4.3)  L  03/18/19  13:50    


 


Mono # (Auto)  0.3 K/uL (0.0-0.8)   03/18/19  13:50    


 


Eos # (Auto)  0.0 K/uL (0.0-0.7)   03/18/19  13:50    


 


Baso # (Auto)  0.0 K/uL (0.0-0.2)   03/18/19  13:50    


 


PT  23.8 Seconds (9.8-13.1)  H  03/18/19  13:50    


 


INR  2.1   03/18/19  13:50    


 


APTT  36.1 Seconds (25.6-37.1)   03/18/19  13:50    


 


pCO2  40 mm/Hg (35-45)   03/19/19  06:24    


 


pO2  76 mm/Hg ()  L  03/19/19  06:24    


 


HCO3  24.9 mmol/L (21-28)   03/19/19  06:24    


 


ABG pH  7.40  (7.35-7.45)   03/19/19  06:24    


 


ABG Total CO2  26.0 mmol/L (22-28)   03/19/19  06:24    


 


ABG O2 Saturation  96.6 % (95-98)   03/19/19  06:24    


 


ABG O2 Content  16.0 ML/dL (15-23)   03/19/19  06:24    


 


ABG Base Excess  0 mmol/L (-2.0-3.0)   03/19/19  06:24    


 


ABG Hemoglobin  12.1 g/dL (11.7-17.4)   03/19/19  06:24    


 


ABG Carboxyhemoglobin  1.8 % (0.5-1.5)  H  03/19/19  06:24    


 


POC ABG HHb (Measured)  3.3 % (0.0-5.0)   03/19/19  06:24    


 


ABG Methemoglobin  1.1 % (0.0-3.0)   03/19/19  06:24    


 


ABG O2 Capacity  16.6 mL/dL (16-24)   03/19/19  06:24    


 


Esteban Test  Yes   03/19/19  06:24    


 


A-a O2 Difference  102.0 mm/Hg  03/19/19  06:24    


 


Hgb O2 Saturation  93.9 % (95.0-98.0)  L  03/19/19  06:24    


 


Vent Mode  3lnc   03/19/19  06:24    


 


FiO2  32.0 %  03/19/19  06:24    


 


Sodium  141 mmol/l (132-148)   03/20/19  05:40    


 


Potassium  3.9 MMOL/L (3.6-5.0)   03/20/19  05:40    


 


Chloride  100 mmol/L ()   03/20/19  05:40    


 


Carbon Dioxide  25 mmol/L (22-30)   03/20/19  05:40    


 


Anion Gap  20  (10-20)   03/20/19  05:40    


 


BUN  24 mg/dl (7-17)  H  03/20/19  05:40    


 


Creatinine  1.2 mg/dl (0.7-1.2)   03/20/19  05:40    


 


Est GFR ( Amer)  56   03/20/19  05:40    


 


Est GFR (Non-Af Amer)  46   03/20/19  05:40    


 


POC Glucose (mg/dL)  120 mg/dL ()  H  03/20/19  05:56    


 


Random Glucose  110 mg/dL ()  H  03/20/19  05:40    


 


Calcium  9.2 mg/dL (8.4-10.2)   03/20/19  05:40    


 


Phosphorus  4.2 mg/dl (2.5-4.5)   03/18/19  13:50    


 


Magnesium  2.1 MG/DL (1.6-2.3)   03/18/19  13:50    


 


Total Bilirubin  0.4 mg/dl (0.2-1.3)   03/18/19  13:50    


 


AST  16 U/L (14-36)   03/18/19  13:50    


 


ALT  14 U/L (9-52)   03/18/19  13:50    


 


Alkaline Phosphatase  93 U/L ()   03/18/19  13:50    


 


Troponin I  0.0440 ng/mL (0.00-0.120)   03/17/19  20:50    


 


NT-Pro-B Natriuret Pep  3510 pg/ml (0-900)  H  03/17/19  20:50    


 


Total Protein  8.3 G/DL (6.3-8.2)  H  03/18/19  13:50    


 


Albumin  4.3 g/dL (3.5-5.0)   03/18/19  13:50    


 


Globulin  4.0 gm/dL (2.2-3.9)  H  03/18/19  13:50    


 


Albumin/Globulin Ratio  1.1  (1.0-2.1)   03/18/19  13:50    


 


Procalcitonin  0.18 NG/ML (0.19-0.49)  L  03/18/19  13:50    


 


Ur L.pneumophila Ag  Negative  (NEGATIVE)   03/19/19  08:50    














- Hospital Course


Hospital Course: 





Patient is a 59 yo female with PMHx of HTN, DM2, HLD, COPD, RUE DVT and asthma 

was brought to ED due to dyspnea that began 3 days ago and progressively 

aggravated. Patient was admitted for evaluation and management of Dyspnea 


In the ED 


Vital signs: unremarkable except for /91-high and O2 sat 93%. 


CBC NO leukocytosis, 


Pro-BNP 3,510-high


troponin neg x1.


CXR: suggestive of some component of CHF, ?subtle patchy pneumonic consolidation

in the lateral RLL.


EKG: unremarkable. 


Patient received Duoneb 3 ml INH x1, Levaquin 500 mg x1, Lasix 40 mg IV x1. and 

was admitted for further evaluation and treatment  





In the floor patient was worked up for Pneumona, CHF and COPD. PE was excluded. 

Cardiology and Pulmonology were consulted.


Patient was placed on levofloxacin 750 iv, duonebs, methylprednisolone. and 

started on Robetussin for her cough and congestion. 


During her stay, patient DM, HTn Depression were monitored and treated with 

medication. 


Patient had a complains for Diarrhea have resolved, last episode 03/18/19. 





Patient was seen and examined today at bedside. Patient had no complains for 

today. she is ready to go home. 


Patient chart were reviewed, Patient is stable to be discharged home .





Patient will be discharged on 


Levaquin 750mg x7days 


Omnicef 300mg x 7days


Medrol dose pack 





Patient should continue all medication. 








Diagnostic examination 


CXR: superimposed pneumonia cannot be excluded, +/- atelactasis 


Echo: Ej 55-60%, LEFT atrium mod  dilated, with mitral regurgitation. 


Head CT scan No cranial hemorrhage noted, But positive for sinusitis 


Chest CT: No large PE noted, Right Highlar adenopathy, pretracheal sub cm node, 

nonspecific. Compression fracture deformities of T9 and T12 appear chronic. age 

indeterminate T11 compression fracture deformity. 














Discharge Exam





- Head Exam


Head Exam: ATRAUMATIC, NORMAL INSPECTION, NORMOCEPHALIC





- Eye Exam


Eye Exam: Normal appearance, PERRL


Pupil Exam: PERRL





- Respiratory Exam


Respiratory Exam: Rhonchi, NORMAL BREATHING PATTERN.  absent: Wheezes, 

Respiratory Distress





- Cardiovascular Exam


Cardiovascular Exam: REGULAR RHYTHM, +S1, +S2





- GI/Abdominal Exam


GI & Abdominal Exam: Normal Bowel Sounds, Unremarkable





- Back Exam


Back exam: NORMAL INSPECTION





- Neurological Exam


Neurological exam: Alert, CN II-XII Intact, Normal Gait, Oriented x3, Reflexes 

Normal





- Psychiatric Exam


Psychiatric exam: Normal Affect, Normal Mood





- Skin


Skin Exam: Dry, Intact, Normal Color





Discharge Plan





- Discharge Medications


Prescriptions: 


Cefdinir [Omnicef] 300 mg PO DAILY #7 cap


Levofloxacin [Levaquin] 750 mg PO DAILY #7 tablet


Methylprednisolone [Medrol Dose Pack (21 tabs)] 4 mg PO DAILY #21 mg


Saccharomyces Boulardi [Florastor] 250 mg PO DAILY #7 cap





- Follow Up Plan


Condition: FAIR


Disposition: DISCHARGED TO HOME CARE


Patient education suggested?: Yes


Instructions:  Pneumonia, Adult (DC), Exacerbation of COPD (DC)


Additional Instructions: 


follow up appt with  tuesday 3/26/19 11:00am


[facility] VISITING [phone]


Referrals: 


Tian Simmons MD [Family Provider] - 


Mark Singh MD [Staff Provider] - 





<Gregoria June - Last Filed: 03/20/19 18:03>





Provider





- Provider


Date of Admission: 


03/17/19 21:59





Attending physician: 


Charles Snyder





Consults: 








03/18/19 11:15


Cardiology Consult Routine 


   Comment: 


   Consulting Provider: Ronald Andrade


   Consulting Physician: Ronald Andrade


   Reason for Consult: SOB/Elevated BNP/   r/o CHF





03/18/19 11:17


Pulmonology Consult Routine 


   Comment: 


   Consulting Provider: Yoav Riggins


   Consulting Physician: Yoav Riggins


   Reason for Consult: COPD exacerbation














Hospital Course





- Lab Results


Lab Results: 


                                  Micro Results





03/17/19 21:00   Blood   Blood Culture - Preliminary


                            NO GROWTH AFTER 48 HOURS


03/17/19 20:41   Blood   Blood Culture - Preliminary


                            NO GROWTH AFTER 48 HOURS





                             Most Recent Lab Values











WBC  10.3 K/uL (4.8-10.8)   03/20/19  05:40    


 


RBC  4.15 Mil/uL (3.80-5.20)   03/20/19  05:40    


 


Hgb  12.3 g/dL (12.0-16.0)   03/20/19  05:40    


 


Hct  37.6 % (34.0-47.0)   03/20/19  05:40    


 


MCV  90.6 fl (81.0-99.0)   03/20/19  05:40    


 


MCH  29.5 pg (27.0-31.0)   03/20/19  05:40    


 


MCHC  32.6 g/dL (33.0-37.0)  L  03/20/19  05:40    


 


RDW  16.1 % (11.5-14.5)  H  03/20/19  05:40    


 


Plt Count  224 K/uL (130-400)   03/20/19  05:40    


 


MPV  9.2 fl (7.2-11.7)   03/18/19  13:50    


 


Neut % (Auto)  76.5 % (50.0-75.0)  H  03/18/19  13:50    


 


Lymph % (Auto)  16.8 % (20.0-40.0)  L  03/18/19  13:50    


 


Mono % (Auto)  6.5 % (0.0-10.0)   03/18/19  13:50    


 


Eos % (Auto)  0.0 % (0.0-4.0)   03/18/19  13:50    


 


Baso % (Auto)  0.2 % (0.0-2.0)   03/18/19  13:50    


 


Neut # (Auto)  3.8 K/uL (1.8-7.0)   03/18/19  13:50    


 


Lymph # (Auto)  0.8 K/uL (1.0-4.3)  L  03/18/19  13:50    


 


Mono # (Auto)  0.3 K/uL (0.0-0.8)   03/18/19  13:50    


 


Eos # (Auto)  0.0 K/uL (0.0-0.7)   03/18/19  13:50    


 


Baso # (Auto)  0.0 K/uL (0.0-0.2)   03/18/19  13:50    


 


PT  23.8 Seconds (9.8-13.1)  H  03/18/19  13:50    


 


INR  2.1   03/18/19  13:50    


 


APTT  36.1 Seconds (25.6-37.1)   03/18/19  13:50    


 


pCO2  40 mm/Hg (35-45)   03/19/19  06:24    


 


pO2  76 mm/Hg ()  L  03/19/19  06:24    


 


HCO3  24.9 mmol/L (21-28)   03/19/19  06:24    


 


ABG pH  7.40  (7.35-7.45)   03/19/19  06:24    


 


ABG Total CO2  26.0 mmol/L (22-28)   03/19/19  06:24    


 


ABG O2 Saturation  96.6 % (95-98)   03/19/19  06:24    


 


ABG O2 Content  16.0 ML/dL (15-23)   03/19/19  06:24    


 


ABG Base Excess  0 mmol/L (-2.0-3.0)   03/19/19  06:24    


 


ABG Hemoglobin  12.1 g/dL (11.7-17.4)   03/19/19  06:24    


 


ABG Carboxyhemoglobin  1.8 % (0.5-1.5)  H  03/19/19  06:24    


 


POC ABG HHb (Measured)  3.3 % (0.0-5.0)   03/19/19  06:24    


 


ABG Methemoglobin  1.1 % (0.0-3.0)   03/19/19  06:24    


 


ABG O2 Capacity  16.6 mL/dL (16-24)   03/19/19  06:24    


 


Esteban Test  Yes   03/19/19  06:24    


 


A-a O2 Difference  102.0 mm/Hg  03/19/19  06:24    


 


Hgb O2 Saturation  93.9 % (95.0-98.0)  L  03/19/19  06:24    


 


Vent Mode  3lnc   03/19/19  06:24    


 


FiO2  32.0 %  03/19/19  06:24    


 


Sodium  141 mmol/l (132-148)   03/20/19  05:40    


 


Potassium  3.9 MMOL/L (3.6-5.0)   03/20/19  05:40    


 


Chloride  100 mmol/L ()   03/20/19  05:40    


 


Carbon Dioxide  25 mmol/L (22-30)   03/20/19  05:40    


 


Anion Gap  20  (10-20)   03/20/19  05:40    


 


BUN  24 mg/dl (7-17)  H  03/20/19  05:40    


 


Creatinine  1.2 mg/dl (0.7-1.2)   03/20/19  05:40    


 


Est GFR ( Amer)  56   03/20/19  05:40    


 


Est GFR (Non-Af Amer)  46   03/20/19  05:40    


 


POC Glucose (mg/dL)  120 mg/dL ()  H  03/20/19  05:56    


 


Random Glucose  110 mg/dL ()  H  03/20/19  05:40    


 


Calcium  9.2 mg/dL (8.4-10.2)   03/20/19  05:40    


 


Phosphorus  4.2 mg/dl (2.5-4.5)   03/18/19  13:50    


 


Magnesium  2.1 MG/DL (1.6-2.3)   03/18/19  13:50    


 


Total Bilirubin  0.4 mg/dl (0.2-1.3)   03/18/19  13:50    


 


AST  16 U/L (14-36)   03/18/19  13:50    


 


ALT  14 U/L (9-52)   03/18/19  13:50    


 


Alkaline Phosphatase  93 U/L ()   03/18/19  13:50    


 


Troponin I  0.0440 ng/mL (0.00-0.120)   03/17/19  20:50    


 


NT-Pro-B Natriuret Pep  3510 pg/ml (0-900)  H  03/17/19  20:50    


 


Total Protein  8.3 G/DL (6.3-8.2)  H  03/18/19  13:50    


 


Albumin  4.3 g/dL (3.5-5.0)   03/18/19  13:50    


 


Globulin  4.0 gm/dL (2.2-3.9)  H  03/18/19  13:50    


 


Albumin/Globulin Ratio  1.1  (1.0-2.1)   03/18/19  13:50    


 


Procalcitonin  0.18 NG/ML (0.19-0.49)  L  03/18/19  13:50    


 


Ur L.pneumophila Ag  Negative  (NEGATIVE)   03/19/19  08:50    














Attending/Attestation





- Attestation


I have personally seen and examined this patient.: Yes


I have fully participated in the care of the patient.: Yes


I have reviewed all pertinent clinical information, including history, physical 

exam and plan: Yes


Notes (Text): 








Discharge Diagnoses:





Healthcare associated Pneumonia


Acute Diastolic Dysfunction


COPD exacerbation


DM type II


Hx of DVT on Xarelto





received IV Cipro, Zosyn  and Vanco in the hospital


Pty refused to go to [facility] for IV abx , insistent on going home 


will d/c home on PO Omnicef and Levaquin


Medrol dose pia, cont nebulizer treatments


ff up with Dr Singh asaash


cont Xarelto


Return to ED if sxs recurs

## 2019-03-20 NOTE — CP.PCM.PN
Subjective





- Date & Time of Evaluation


Date of Evaluation: 03/20/19





- Subjective


Subjective: 





F/U PNA, COPDEx.








No SOB, no chest congestion, minimal cough.





Objective





- Vital Signs/Intake and Output


Vital Signs (last 24 hours): 


                                        











Temp Pulse Resp BP Pulse Ox


 


 98.5 F   56 L  18   113/70   96 


 


 03/20/19 08:16  03/20/19 08:16  03/20/19 08:16  03/20/19 08:16  03/20/19 08:16











- Labs


Labs: 


                                        





                                 03/20/19 05:40 





                                 03/20/19 05:40 





                                        











PT  23.8 Seconds (9.8-13.1)  H  03/18/19  13:50    


 


INR  2.1   03/18/19  13:50    


 


APTT  36.1 Seconds (25.6-37.1)   03/18/19  13:50    














- Constitutional


Appears: No Acute Distress





- Head Exam


Head Exam: NORMAL INSPECTION





- Eye Exam


Eye Exam: PERRL





- ENT Exam


ENT Exam: Normal Exam





- Neck Exam


Neck Exam: Normal Inspection





- Respiratory Exam


Respiratory Exam: Decreased Breath Sounds (at bases)





- Cardiovascular Exam


Cardiovascular Exam: REGULAR RHYTHM





- GI/Abdominal Exam


GI & Abdominal Exam: Soft, Normal Bowel Sounds





- Extremities Exam


Extremities Exam: Tenderness


Additional comments: 





R knee





- Back Exam


Back Exam: tenderness





- Neurological Exam


Additional comments: 





no focal motor/sensory deficit





- Psychiatric Exam


Psychiatric exam: Normal Mood





- Skin


Skin Exam: Warm





Assessment and Plan


(1) PNA (pneumonia)


Status: Acute   





(2) COPD exacerbation


Status: Chronic   





- Assessment and Plan (Free Text)


Plan: 





Agree with discharge, see instruction medication list, Patient will follow out 

patient with Pulmonary Dr Singh

## 2019-03-21 NOTE — PQF
PROVIDER RESPONSE TEXT:



Type of Pneumonia: Unknown



REVIEWER QUERY TEXT:



Pneumonia Specificity



HCAP Pneumonia is documented in the Medical Record. Please specify the type of pneumonia and the caus
ative organism (includes probable or suspected)

Such as:

Type:

-- Aspiration pneumonia (please also specify the aspirate)

-- Bacterial (please document suspected or probable organism)

-- Bronchopneumonia (please document suspected or probable organism)

-- Interstitial pneumonia

-- Organizing pneumonia / BOOP

-- Viral

-- Other, please specify



The patient's Clinical Indicators include:

Presents with SOB, cough, runny nose and congestion. Recent hospitalizations.



CXR: No active pulmonary disease. Patchy airspace disease in the right  lower lobe may represent subs
egmental atelectasis however  superimposed pneumonia cannot be excluded.



CT Chest: Tree-in-bud like opacities as above may reflect infectious or inflammatory etiologies. Patc
hy infiltrate in the posterior left lower lobe consistent with pneumonia. See full report



WBC 6.4 L shift,  Ur L. pneumophila Ag negative, Strep pneumoniae Ag pending, Sputum CS ordered



Rx: Triple IVAB, Solumedrol, Duonebs







Query created by: Jennifer Burch on 3/20/2019 11:14 AM





Electronically signed by:  Yoav Riggins MD 3/21/2019 12:45 PM

## 2019-03-23 VITALS — OXYGEN SATURATION: 93 %

## 2023-11-08 NOTE — CON
DATE:



INFECTIOUS DISEASE CONSULTATION



HISTORY OF PRESENT ILLNESS:  The patient is a 57-year-old obese female who

was admitted after being seen in the Emergency Room with complaint of

increasing mid back pain and right flank pain of 3 weeks.  The patient

apparently was seen by her physicians, one being Dr. Simmons and another

she was sent to an Orthopedist Dr. Merritt, who ordered the CT of the

thoracic spine.



PAST MEDICAL HISTORY:  Includes DVT and PE.  She has an IVC filter,

hypertension, COPD, peripheral vascular disease and chronic back pain with

multiple compression fractures of the thoracic spine.  The patient also

admits to tobacco use and she is presently taking oxycodone for her back

pain, which radiates to the right flank.  She does use a walker or cane to

ambulate.  The patient is alert, cooperative and oriented when I examine

her.  She denies any history of fever or chills.  Her main complaint is

that of back pain and initially with question of renal colic, until the

thoracic spine x-ray was evaluated and it shows that there was extensive

central compression deformity of T12 vertebral body with possible complete

fracture.  Gas seen within intervertebral disk is likely vacuum disc

phenomenon associated with compression fracture, but the possibility of

osteomyelitis must be considered.



PHYSICAL EXAMINATION

GENERAL:  She is alert, cooperative and oriented to time and place.

HEENT:  Within normal limits.  _____ hemorrhage is noted.

NECK:  Supple.

LUNGS:  Bilaterally, there is some scattered wheezing.

HEART:  Bradycardiac.  Heart rate on EKG was 50.

ABDOMEN:  Soft, but with right lower quadrant and right CVA tenderness. 

Unable to touch the patient's mid thoracic lumbar spine without her

responding with pain, also has pain radiating to actually both hips. 

Again, I am unable to touch her hips and sciatic area without her having

yelping in pain.



LABORATORY DATA:  There are obviously no microbiology results.  No cultures

as yet.  Blood culture so far is negative.  Respiratory nhung is negative. 

CBC 7.8, hemoglobin 15, platelets 183 and differential essentially within

normal limits.  Chemistry:  GFR is greater than 60 and creatinine is 0.9. 

Of note, her alkaline phosphatase is minimally elevated at 141 as is her

AST of 44.





















ASSESSMENT:  At this point in time, her multiple disease processes are

noted as above.  Would rule out osteomyelitis or diskitis with the T12

fracture, which apparently is almost complete.  Would consider treating

this patient empirically for osteomyelitis, but would feel first she should

be evaluated by the back Surgeon and either should have a gadolinium, MRI

as suggested or a biopsy done either by the Surgeon or Interventional

Radiologist in which we could possibly get some material for culture.  At

this moment, I will hold off any antibiotic treatment.





__________________________________________

Sudheer Ralph MD



DD:  02/12/2018 17:06:32

DT:  02/12/2018 20:01:45

Job # [id number] What Type Of Note Output Would You Prefer (Optional)?: Standard Output What Is The Reason For Today's Visit?: Full Body Skin Examination What Is The Reason For Today's Visit? (Being Monitored For X): concerning skin lesions on an annual basis